# Patient Record
Sex: FEMALE | Race: WHITE | NOT HISPANIC OR LATINO | Employment: OTHER | URBAN - METROPOLITAN AREA
[De-identification: names, ages, dates, MRNs, and addresses within clinical notes are randomized per-mention and may not be internally consistent; named-entity substitution may affect disease eponyms.]

---

## 2018-10-02 ENCOUNTER — HOSPITAL ENCOUNTER (INPATIENT)
Facility: HOSPITAL | Age: 73
LOS: 9 days | Discharge: HOME | End: 2018-10-11
Attending: EMERGENCY MEDICINE | Admitting: OBSTETRICS & GYNECOLOGY

## 2018-10-02 ENCOUNTER — APPOINTMENT (OUTPATIENT)
Dept: CT IMAGING | Facility: HOSPITAL | Age: 73
End: 2018-10-02

## 2018-10-02 DIAGNOSIS — R19.00 PELVIC MASS: ICD-10-CM

## 2018-10-02 DIAGNOSIS — R10.31 ACUTE BILATERAL LOWER ABDOMINAL PAIN: Primary | ICD-10-CM

## 2018-10-02 DIAGNOSIS — R10.32 ACUTE BILATERAL LOWER ABDOMINAL PAIN: Primary | ICD-10-CM

## 2018-10-02 PROBLEM — I10 HTN (HYPERTENSION): Status: ACTIVE | Noted: 2018-10-02

## 2018-10-02 LAB
ALBUMIN SERPL-MCNC: 4.54 G/DL (ref 3.2–4.8)
ALBUMIN/GLOB SERPL: 1.9 G/DL (ref 1.5–2.5)
ALP SERPL-CCNC: 53 U/L (ref 25–100)
ALT SERPL W P-5'-P-CCNC: 21 U/L (ref 7–40)
ANION GAP SERPL CALCULATED.3IONS-SCNC: 8 MMOL/L (ref 3–11)
AST SERPL-CCNC: 20 U/L (ref 0–33)
BACTERIA UR QL AUTO: ABNORMAL /HPF
BASOPHILS # BLD AUTO: 0.02 10*3/MM3 (ref 0–0.2)
BASOPHILS NFR BLD AUTO: 0.2 % (ref 0–1)
BILIRUB SERPL-MCNC: 0.7 MG/DL (ref 0.3–1.2)
BILIRUB UR QL STRIP: NEGATIVE
BUN BLD-MCNC: 16 MG/DL (ref 9–23)
BUN/CREAT SERPL: 30.2 (ref 7–25)
CALCIUM SPEC-SCNC: 9.5 MG/DL (ref 8.7–10.4)
CANCER AG125 SERPL QL: 40.1 U/ML (ref 0–30.2)
CEA SERPL-MCNC: 2.1 NG/ML (ref 0–2.5)
CHLORIDE SERPL-SCNC: 98 MMOL/L (ref 99–109)
CLARITY UR: ABNORMAL
CO2 SERPL-SCNC: 30 MMOL/L (ref 20–31)
COLOR UR: YELLOW
CREAT BLD-MCNC: 0.53 MG/DL (ref 0.6–1.3)
D-LACTATE SERPL-SCNC: 1.5 MMOL/L (ref 0.5–2)
DEPRECATED RDW RBC AUTO: 43.3 FL (ref 37–54)
EOSINOPHIL # BLD AUTO: 0 10*3/MM3 (ref 0–0.3)
EOSINOPHIL NFR BLD AUTO: 0 % (ref 0–3)
ERYTHROCYTE [DISTWIDTH] IN BLOOD BY AUTOMATED COUNT: 14 % (ref 11.3–14.5)
GFR SERPL CREATININE-BSD FRML MDRD: 113 ML/MIN/1.73
GLOBULIN UR ELPH-MCNC: 2.4 GM/DL
GLUCOSE BLD-MCNC: 126 MG/DL (ref 70–100)
GLUCOSE UR STRIP-MCNC: NEGATIVE MG/DL
HCT VFR BLD AUTO: 43.3 % (ref 34.5–44)
HGB BLD-MCNC: 14 G/DL (ref 11.5–15.5)
HGB UR QL STRIP.AUTO: NEGATIVE
HOLD SPECIMEN: NORMAL
HOLD SPECIMEN: NORMAL
HYALINE CASTS UR QL AUTO: ABNORMAL /LPF
IMM GRANULOCYTES # BLD: 0.01 10*3/MM3 (ref 0–0.03)
IMM GRANULOCYTES NFR BLD: 0.1 % (ref 0–0.6)
KETONES UR QL STRIP: NEGATIVE
LEUKOCYTE ESTERASE UR QL STRIP.AUTO: ABNORMAL
LIPASE SERPL-CCNC: 41 U/L (ref 6–51)
LYMPHOCYTES # BLD AUTO: 1.04 10*3/MM3 (ref 0.6–4.8)
LYMPHOCYTES NFR BLD AUTO: 11.7 % (ref 24–44)
MCH RBC QN AUTO: 27.3 PG (ref 27–31)
MCHC RBC AUTO-ENTMCNC: 32.3 G/DL (ref 32–36)
MCV RBC AUTO: 84.6 FL (ref 80–99)
MONOCYTES # BLD AUTO: 0.37 10*3/MM3 (ref 0–1)
MONOCYTES NFR BLD AUTO: 4.2 % (ref 0–12)
NEUTROPHILS # BLD AUTO: 7.46 10*3/MM3 (ref 1.5–8.3)
NEUTROPHILS NFR BLD AUTO: 83.9 % (ref 41–71)
NITRITE UR QL STRIP: NEGATIVE
PH UR STRIP.AUTO: 7.5 [PH] (ref 5–8)
PLATELET # BLD AUTO: 215 10*3/MM3 (ref 150–450)
PMV BLD AUTO: 9.9 FL (ref 6–12)
POTASSIUM BLD-SCNC: 3.7 MMOL/L (ref 3.5–5.5)
PROT SERPL-MCNC: 6.9 G/DL (ref 5.7–8.2)
PROT UR QL STRIP: NEGATIVE
RBC # BLD AUTO: 5.12 10*6/MM3 (ref 3.89–5.14)
RBC # UR: ABNORMAL /HPF
REF LAB TEST METHOD: ABNORMAL
SODIUM BLD-SCNC: 136 MMOL/L (ref 132–146)
SP GR UR STRIP: 1.02 (ref 1–1.03)
SQUAMOUS #/AREA URNS HPF: ABNORMAL /HPF
UROBILINOGEN UR QL STRIP: ABNORMAL
WBC NRBC COR # BLD: 8.89 10*3/MM3 (ref 3.5–10.8)
WBC UR QL AUTO: ABNORMAL /HPF
WHOLE BLOOD HOLD SPECIMEN: NORMAL
WHOLE BLOOD HOLD SPECIMEN: NORMAL

## 2018-10-02 PROCEDURE — 82378 CARCINOEMBRYONIC ANTIGEN: CPT | Performed by: HOSPITALIST

## 2018-10-02 PROCEDURE — 85025 COMPLETE CBC W/AUTO DIFF WBC: CPT

## 2018-10-02 PROCEDURE — 83605 ASSAY OF LACTIC ACID: CPT | Performed by: EMERGENCY MEDICINE

## 2018-10-02 PROCEDURE — 99284 EMERGENCY DEPT VISIT MOD MDM: CPT

## 2018-10-02 PROCEDURE — 74176 CT ABD & PELVIS W/O CONTRAST: CPT

## 2018-10-02 PROCEDURE — 25010000002 ONDANSETRON PER 1 MG: Performed by: HOSPITALIST

## 2018-10-02 PROCEDURE — 99223 1ST HOSP IP/OBS HIGH 75: CPT | Performed by: HOSPITALIST

## 2018-10-02 PROCEDURE — 83690 ASSAY OF LIPASE: CPT | Performed by: EMERGENCY MEDICINE

## 2018-10-02 PROCEDURE — 25810000003 SODIUM CHLORIDE 0.9 % WITH KCL 20 MEQ 20-0.9 MEQ/L-% SOLUTION: Performed by: HOSPITALIST

## 2018-10-02 PROCEDURE — 25010000002 HYDROMORPHONE PER 4 MG: Performed by: EMERGENCY MEDICINE

## 2018-10-02 PROCEDURE — 81001 URINALYSIS AUTO W/SCOPE: CPT

## 2018-10-02 PROCEDURE — 80053 COMPREHEN METABOLIC PANEL: CPT | Performed by: EMERGENCY MEDICINE

## 2018-10-02 PROCEDURE — 86304 IMMUNOASSAY TUMOR CA 125: CPT | Performed by: HOSPITALIST

## 2018-10-02 PROCEDURE — 25010000002 ONDANSETRON PER 1 MG: Performed by: EMERGENCY MEDICINE

## 2018-10-02 PROCEDURE — 25010000002 HYDROMORPHONE PER 4 MG: Performed by: HOSPITALIST

## 2018-10-02 RX ORDER — HYDROMORPHONE HYDROCHLORIDE 1 MG/ML
0.5 INJECTION, SOLUTION INTRAMUSCULAR; INTRAVENOUS; SUBCUTANEOUS ONCE
Status: COMPLETED | OUTPATIENT
Start: 2018-10-02 | End: 2018-10-02

## 2018-10-02 RX ORDER — HYDROMORPHONE HYDROCHLORIDE 1 MG/ML
0.5 INJECTION, SOLUTION INTRAMUSCULAR; INTRAVENOUS; SUBCUTANEOUS
Status: DISCONTINUED | OUTPATIENT
Start: 2018-10-02 | End: 2018-10-03

## 2018-10-02 RX ORDER — SODIUM CHLORIDE 0.9 % (FLUSH) 0.9 %
10 SYRINGE (ML) INJECTION AS NEEDED
Status: DISCONTINUED | OUTPATIENT
Start: 2018-10-02 | End: 2018-10-08

## 2018-10-02 RX ORDER — SODIUM CHLORIDE 0.9 % (FLUSH) 0.9 %
3 SYRINGE (ML) INJECTION EVERY 12 HOURS SCHEDULED
Status: DISCONTINUED | OUTPATIENT
Start: 2018-10-02 | End: 2018-10-08

## 2018-10-02 RX ORDER — ONDANSETRON 2 MG/ML
4 INJECTION INTRAMUSCULAR; INTRAVENOUS ONCE
Status: COMPLETED | OUTPATIENT
Start: 2018-10-02 | End: 2018-10-02

## 2018-10-02 RX ORDER — SODIUM CHLORIDE 0.9 % (FLUSH) 0.9 %
3-10 SYRINGE (ML) INJECTION AS NEEDED
Status: DISCONTINUED | OUTPATIENT
Start: 2018-10-02 | End: 2018-10-08

## 2018-10-02 RX ORDER — ONDANSETRON 4 MG/1
4 TABLET, FILM COATED ORAL EVERY 6 HOURS PRN
Status: DISCONTINUED | OUTPATIENT
Start: 2018-10-02 | End: 2018-10-11 | Stop reason: HOSPADM

## 2018-10-02 RX ORDER — LOSARTAN POTASSIUM 50 MG/1
50 TABLET ORAL DAILY
COMMUNITY

## 2018-10-02 RX ORDER — ONDANSETRON 2 MG/ML
4 INJECTION INTRAMUSCULAR; INTRAVENOUS EVERY 6 HOURS PRN
Status: DISCONTINUED | OUTPATIENT
Start: 2018-10-02 | End: 2018-10-11 | Stop reason: HOSPADM

## 2018-10-02 RX ORDER — SODIUM CHLORIDE AND POTASSIUM CHLORIDE 150; 900 MG/100ML; MG/100ML
100 INJECTION, SOLUTION INTRAVENOUS CONTINUOUS
Status: DISCONTINUED | OUTPATIENT
Start: 2018-10-02 | End: 2018-10-03

## 2018-10-02 RX ORDER — NALOXONE HCL 0.4 MG/ML
0.4 VIAL (ML) INJECTION
Status: DISCONTINUED | OUTPATIENT
Start: 2018-10-02 | End: 2018-10-03

## 2018-10-02 RX ADMIN — ONDANSETRON 4 MG: 2 INJECTION INTRAMUSCULAR; INTRAVENOUS at 10:34

## 2018-10-02 RX ADMIN — HYDROMORPHONE HYDROCHLORIDE 0.5 MG: 1 INJECTION, SOLUTION INTRAMUSCULAR; INTRAVENOUS; SUBCUTANEOUS at 17:44

## 2018-10-02 RX ADMIN — HYDROMORPHONE HYDROCHLORIDE 0.5 MG: 1 INJECTION, SOLUTION INTRAMUSCULAR; INTRAVENOUS; SUBCUTANEOUS at 22:37

## 2018-10-02 RX ADMIN — HYDROMORPHONE HYDROCHLORIDE 0.5 MG: 1 INJECTION, SOLUTION INTRAMUSCULAR; INTRAVENOUS; SUBCUTANEOUS at 15:33

## 2018-10-02 RX ADMIN — HYDROMORPHONE HYDROCHLORIDE 0.5 MG: 1 INJECTION, SOLUTION INTRAMUSCULAR; INTRAVENOUS; SUBCUTANEOUS at 13:24

## 2018-10-02 RX ADMIN — POTASSIUM CHLORIDE AND SODIUM CHLORIDE 100 ML/HR: 900; 150 INJECTION, SOLUTION INTRAVENOUS at 13:29

## 2018-10-02 RX ADMIN — SODIUM CHLORIDE 1000 ML: 9 INJECTION, SOLUTION INTRAVENOUS at 10:34

## 2018-10-02 RX ADMIN — HYDROMORPHONE HYDROCHLORIDE 0.5 MG: 1 INJECTION, SOLUTION INTRAMUSCULAR; INTRAVENOUS; SUBCUTANEOUS at 10:35

## 2018-10-02 RX ADMIN — SODIUM CHLORIDE 500 ML: 9 INJECTION, SOLUTION INTRAVENOUS at 13:24

## 2018-10-02 RX ADMIN — ONDANSETRON 4 MG: 2 INJECTION INTRAMUSCULAR; INTRAVENOUS at 17:52

## 2018-10-02 RX ADMIN — Medication 3 ML: at 13:28

## 2018-10-02 RX ADMIN — POTASSIUM CHLORIDE AND SODIUM CHLORIDE 100 ML/HR: 900; 150 INJECTION, SOLUTION INTRAVENOUS at 23:51

## 2018-10-02 NOTE — ED PROVIDER NOTES
Subjective   Issa Farmer is a 72 y.o.female who presents to the emergency department with complaints of pain in the left lower quadrant of the abdomen and the left flank which began this morning at 0100. She thought that the pain would go away, however, she states that it has persisted and remained unchanged since onset which prompted arrival here for evaluation. She has been unable to find a comfortable position but tells us that the pain does not seem to be worsened by sitting up or lying down. She has also been nauseous and told a family member that she was feeling bloated. There are no other acute complaints at this time.        History provided by:  Patient and relative  Abdominal Pain   Pain location:  LLQ and L flank  Pain radiates to:  Does not radiate  Pain severity:  Moderate  Onset quality:  Sudden  Duration:  9 hours  Timing:  Constant  Progression:  Unchanged  Chronicity:  New  Relieved by:  None tried  Worsened by:  Nothing  Ineffective treatments:  None tried  Associated symptoms: nausea    Associated symptoms: no vomiting    Risk factors: being elderly        Review of Systems   Gastrointestinal: Positive for abdominal distention, abdominal pain and nausea. Negative for vomiting.   Genitourinary: Positive for flank pain.   All other systems reviewed and are negative.      Past Medical History:   Diagnosis Date   • Cancer (CMS/HCC)    • Hypertension        No Known Allergies    Past Surgical History:   Procedure Laterality Date   • MASTECTOMY     • REPLACEMENT TOTAL KNEE     • TUBAL ABDOMINAL LIGATION         History reviewed. No pertinent family history.    Social History     Social History   • Marital status:      Social History Main Topics   • Smoking status: Never Smoker   • Smokeless tobacco: Never Used   • Alcohol use No   • Drug use: No   • Sexual activity: Defer     Other Topics Concern   • Not on file         Objective   Physical Exam   Constitutional: She is oriented to person,  place, and time. She appears well-developed and well-nourished. No distress.   HENT:   Head: Normocephalic and atraumatic.   Eyes: Conjunctivae are normal. No scleral icterus.   Neck: Normal range of motion. Neck supple.   Cardiovascular: Normal rate, regular rhythm and normal heart sounds.    No murmur heard.  Pulmonary/Chest: Effort normal and breath sounds normal. No respiratory distress.   Abdominal: Soft. Bowel sounds are normal. There is tenderness in the right lower quadrant. There is no rebound and no guarding.   She had a soft belly with no rebound tenderness. Active bowel sounds. She was tender in the right lower quadrant.   Musculoskeletal: She exhibits no edema.   Neurological: She is alert and oriented to person, place, and time.   Skin: Skin is warm and dry. No erythema.   Psychiatric: She has a normal mood and affect. Her behavior is normal.   Nursing note and vitals reviewed.      Procedures         ED Course  ED Course as of Oct 02 1332   Tue Oct 02, 2018   1124 Dr. Reese paged the hospitalist for admission.  [AS]   1124 Dr. Reese is at the bedside reevaluating the patient. Discussed findings with the patient and her family including a normal urinalysis and lab results although she does have a large pelvic mass on her CT scan. They are agreeable with the plan for admission for further workup.   [AS]   1124 I reviewed Mrs. Sánchez's CT report and the images as well.  It is not clear if this is arising from her reproductive tract.  Will seek admission to the hospital for surgical consultation as this thing is causing her great deal of pain and is quite large.  I have paged Dr. Baca who is on-call for the hospitalist service  [DT]   1128 Dr. Reese discussed the case in detail with the hospitalist Dr. Baca who will admit to med surg. Will also consult Dr. Gale Rene, on call for general surgery.    [AS]   1130 Dr. Gale Rene, general surgery, has been paged.  [AS]   1133 Dr. Reese  consulted Dr. Gale Rene, general surgery.   [AS]      ED Course User Index  [AS] Erica Hutson  [DT] Gil Reese MD                     MDM  Number of Diagnoses or Management Options  Acute bilateral lower abdominal pain: new and requires workup  Pelvic mass: new and requires workup     Amount and/or Complexity of Data Reviewed  Clinical lab tests: reviewed and ordered  Tests in the radiology section of CPT®: ordered and reviewed  Discuss the patient with other providers: yes  Independent visualization of images, tracings, or specimens: yes    Patient Progress  Patient progress: improved      Final diagnoses:   Acute bilateral lower abdominal pain   Pelvic mass       Documentation assistance provided by mayra Hutson.  Information recorded by the scribe was done at my direction and has been verified and validated by me.     Erica Hutson  10/02/18 1029       Erica Hutson  10/02/18 1131       Gil Reese MD  10/02/18 0131

## 2018-10-02 NOTE — H&P
[facility] Medicine Services  HISTORY AND PHYSICAL    Patient Name: Issa Farmer  : 1945  MRN: [id number]  Primary Care Physician: Yan Unger DO  Date of admission: 10/2/2018      Subjective   Subjective     Chief Complaint:  Abdominal pain    HPI:  Issa Farmer is a 72 y.o. female with acute onset of LLQ/L flank abdominal pain, 10/10, waxing and waning, described as severe and sharp and similar to labor/birth pain. Associated n/v. Unable to take po. Increased abdominal bloating this week. Normal BM with laxative this am. No blood in stool. ?Vaginal discharge and thought she was getting a yeast infection. No abnormal uterine bleeding. Denies weight loss.    No colonoscopy ever.  No pap smear in several years.  Regular mammograms, and due this year.  Hx of L mastectomy for breast cancer remotely.    Review of Systems   Constitutional: Positive for activity change, appetite change and fatigue.   Respiratory: Positive for cough and shortness of breath. Negative for chest tightness, wheezing and stridor.    Cardiovascular: Negative for chest pain, palpitations and leg swelling.   Gastrointestinal: Positive for abdominal distention, abdominal pain, nausea and vomiting.   Genitourinary: Negative for dysuria.   Musculoskeletal: Positive for back pain.   Neurological: Positive for light-headedness.   Hematological: Negative for adenopathy.          Otherwise 10-system ROS reviewed and is negative except as mentioned in the HPI.    Personal History     Past Medical History:   Diagnosis Date   • Cancer (CMS/HCC)    • Hypertension        Past Surgical History:   Procedure Laterality Date   • MASTECTOMY     • REPLACEMENT TOTAL KNEE     • TUBAL ABDOMINAL LIGATION         Family History: DM, breast cancer    Social History:  reports that she has never smoked. She has never used smokeless tobacco. She reports that she does not drink alcohol or use drugs.  Social History      Social History Narrative   • No narrative on file       Medications:  Available home medication information reviewed/reconciled    No Known Allergies    Objective   Objective     Vital Signs:   Temp:  [98.3 °F (36.8 °C)] 98.3 °F (36.8 °C)  Heart Rate:  [78-89] 78  Resp:  [16] 16  BP: (154-161)/(81-97) 154/83        Physical Exam   NAD, alert and oriented  OP clear, dry MM  PERRL  Neck supple  RRR  CTAB  +BS, mod distended, TTP L abd/LLQ, no guarding or rebound, no flank tenderness  No c/c, tr edema B  No rashes  Normal affect    Results Reviewed:  I have personally reviewed current lab, radiology, and data and agree.      Results from last 7 days  Lab Units 10/02/18  0942   WBC 10*3/mm3 8.89   HEMOGLOBIN g/dL 14.0   HEMATOCRIT % 43.3   PLATELETS 10*3/mm3 215       Results from last 7 days  Lab Units 10/02/18  0942   SODIUM mmol/L 136   POTASSIUM mmol/L 3.7   CHLORIDE mmol/L 98*   CO2 mmol/L 30.0   BUN mg/dL 16   CREATININE mg/dL 0.53*   GLUCOSE mg/dL 126*   CALCIUM mg/dL 9.5   ALT (SGPT) U/L 21   AST (SGOT) U/L 20     Estimated Creatinine Clearance: 63.9 mL/min (A) (by C-G formula based on SCr of 0.53 mg/dL (L)).  Brief Urine Lab Results  (Last result in the past 365 days)      Color   Clarity   Blood   Leuk Est   Nitrite   Protein   CREAT   Urine HCG        10/02/18 0942 Yellow Cloudy(A) Negative Moderate (2+)(A) Negative Negative             No results found for: BNP  Imaging Results (last 24 hours)     Procedure Component Value Units Date/Time    CT Abdomen Pelvis Without Contrast [[id number]] Collected:  10/02/18 1102     Updated:  10/02/18 1106    Narrative:       EXAMINATION: CT ABDOMEN AND PELVIS WO CONTRAST-      INDICATION: Abdominal pain, unspecified.     TECHNIQUE:  Axial CT data of the abdomen and pelvis were obtained  helically without IV contrast.  Multiplanar reformatted images were  generated and reviewed. The radiation dose reduction device was turned  on for each scan per the ALARA (As Low as  Reasonably Achievable)  protocol.     COMPARISONS:  None.     FINDINGS: There is a large mass centered to the right of midline in the  pelvis measuring 12 x 18 x 10 cm in maximal dimension. The superolateral  right component is solid and the rest of the lesion is cystic. There are  some punctate calcifications and internal heterogeneities to the solid  component.  The solid abdominal organs are unremarkable in unenhanced CT  appearance. Uterus is unremarkable. No free fluid. No enlarged lymph  node. No dilated large or small bowel. Appendix is not identified.  Colonic diverticulosis. Clear lung bases. Unremarkable body wall soft  tissues and unremarkable bones.       Impression:       1. Large part cystic/part solid mass in the pelvis. Recommend surgical  consultation.  2. No acute finding.     D:  10/02/2018  E:  10/02/2018     This report was finalized on 10/2/2018 11:04 AM by Leonard Mendosa.                Assessment/Plan   Assessment / Plan     Hospital Problem List     * (Principal)Acute bilateral lower abdominal pain    Pelvic mass    HTN (hypertension)            Assessment & Plan:  Pelvic mass  --consult surgery  Abd pain/nausea  --IVF, dilaudid/zofran  HTN  --hold cozaar, volume depleted  Hx of breast cancer    DVT prophylaxis:  SCDS    CODE STATUS:    Code Status and Medical Interventions:   Ordered at: 10/02/18 1153     Code Status:    CPR     Medical Interventions (Level of Support Prior to Arrest):    Full       Admission Status:  I believe this patient meets observation criteria.      Electronically signed by Warren Baca MD, 10/02/18, 11:56 AM.

## 2018-10-03 LAB
ALBUMIN SERPL-MCNC: 3.61 G/DL (ref 3.2–4.8)
ALBUMIN/GLOB SERPL: 2.1 G/DL (ref 1.5–2.5)
ALP SERPL-CCNC: 42 U/L (ref 25–100)
ALT SERPL W P-5'-P-CCNC: 12 U/L (ref 7–40)
ANION GAP SERPL CALCULATED.3IONS-SCNC: 6 MMOL/L (ref 3–11)
APTT PPP: 30.5 SECONDS (ref 24–31)
AST SERPL-CCNC: 17 U/L (ref 0–33)
BILIRUB SERPL-MCNC: 0.4 MG/DL (ref 0.3–1.2)
BUN BLD-MCNC: 11 MG/DL (ref 9–23)
BUN/CREAT SERPL: 24.4 (ref 7–25)
CALCIUM SPEC-SCNC: 8.2 MG/DL (ref 8.7–10.4)
CHLORIDE SERPL-SCNC: 105 MMOL/L (ref 99–109)
CO2 SERPL-SCNC: 28 MMOL/L (ref 20–31)
CREAT BLD-MCNC: 0.45 MG/DL (ref 0.6–1.3)
GFR SERPL CREATININE-BSD FRML MDRD: 136 ML/MIN/1.73
GLOBULIN UR ELPH-MCNC: 1.7 GM/DL
GLUCOSE BLD-MCNC: 89 MG/DL (ref 70–100)
INR PPP: 1.04 (ref 0.91–1.09)
POTASSIUM BLD-SCNC: 4.3 MMOL/L (ref 3.5–5.5)
PROT SERPL-MCNC: 5.3 G/DL (ref 5.7–8.2)
PROTHROMBIN TIME: 10.9 SECONDS (ref 9.6–11.5)
SODIUM BLD-SCNC: 139 MMOL/L (ref 132–146)

## 2018-10-03 PROCEDURE — 99223 1ST HOSP IP/OBS HIGH 75: CPT | Performed by: OBSTETRICS & GYNECOLOGY

## 2018-10-03 PROCEDURE — 25810000003 SODIUM CHLORIDE 0.9 % WITH KCL 20 MEQ 20-0.9 MEQ/L-% SOLUTION: Performed by: HOSPITALIST

## 2018-10-03 PROCEDURE — 85730 THROMBOPLASTIN TIME PARTIAL: CPT | Performed by: SURGERY

## 2018-10-03 PROCEDURE — 85610 PROTHROMBIN TIME: CPT | Performed by: SURGERY

## 2018-10-03 PROCEDURE — 25010000002 HYDROMORPHONE PER 4 MG: Performed by: INTERNAL MEDICINE

## 2018-10-03 PROCEDURE — 80053 COMPREHEN METABOLIC PANEL: CPT | Performed by: HOSPITALIST

## 2018-10-03 PROCEDURE — 25010000002 HYDROMORPHONE PER 4 MG: Performed by: HOSPITALIST

## 2018-10-03 PROCEDURE — 99233 SBSQ HOSP IP/OBS HIGH 50: CPT | Performed by: INTERNAL MEDICINE

## 2018-10-03 RX ORDER — NALOXONE HCL 0.4 MG/ML
0.4 VIAL (ML) INJECTION
Status: DISCONTINUED | OUTPATIENT
Start: 2018-10-03 | End: 2018-10-08

## 2018-10-03 RX ORDER — HYDROCODONE BITARTRATE AND ACETAMINOPHEN 5; 325 MG/1; MG/1
1 TABLET ORAL EVERY 6 HOURS PRN
Status: DISCONTINUED | OUTPATIENT
Start: 2018-10-03 | End: 2018-10-04

## 2018-10-03 RX ORDER — HYDROMORPHONE HYDROCHLORIDE 1 MG/ML
0.5 INJECTION, SOLUTION INTRAMUSCULAR; INTRAVENOUS; SUBCUTANEOUS
Status: DISCONTINUED | OUTPATIENT
Start: 2018-10-03 | End: 2018-10-08

## 2018-10-03 RX ORDER — SENNA AND DOCUSATE SODIUM 50; 8.6 MG/1; MG/1
2 TABLET, FILM COATED ORAL 2 TIMES DAILY
Status: DISCONTINUED | OUTPATIENT
Start: 2018-10-03 | End: 2018-10-08

## 2018-10-03 RX ORDER — BISACODYL 5 MG/1
10 TABLET, DELAYED RELEASE ORAL DAILY PRN
Status: DISCONTINUED | OUTPATIENT
Start: 2018-10-03 | End: 2018-10-08

## 2018-10-03 RX ORDER — LOSARTAN POTASSIUM 25 MG/1
25 TABLET ORAL
Status: DISCONTINUED | OUTPATIENT
Start: 2018-10-03 | End: 2018-10-04

## 2018-10-03 RX ADMIN — LOSARTAN POTASSIUM 25 MG: 25 TABLET, FILM COATED ORAL at 12:26

## 2018-10-03 RX ADMIN — POLYETHYLENE GLYCOL 3350 17 G: 17 POWDER, FOR SOLUTION ORAL at 21:48

## 2018-10-03 RX ADMIN — POTASSIUM CHLORIDE AND SODIUM CHLORIDE 100 ML/HR: 900; 150 INJECTION, SOLUTION INTRAVENOUS at 10:27

## 2018-10-03 RX ADMIN — Medication 3 ML: at 10:18

## 2018-10-03 RX ADMIN — HYDROMORPHONE HYDROCHLORIDE 0.5 MG: 1 INJECTION, SOLUTION INTRAMUSCULAR; INTRAVENOUS; SUBCUTANEOUS at 06:10

## 2018-10-03 RX ADMIN — HYDROMORPHONE HYDROCHLORIDE 0.5 MG: 1 INJECTION, SOLUTION INTRAMUSCULAR; INTRAVENOUS; SUBCUTANEOUS at 16:01

## 2018-10-03 RX ADMIN — HYDROCODONE BITARTRATE AND ACETAMINOPHEN 1 TABLET: 5; 325 TABLET ORAL at 12:26

## 2018-10-03 RX ADMIN — HYDROCODONE BITARTRATE AND ACETAMINOPHEN 1 TABLET: 5; 325 TABLET ORAL at 21:48

## 2018-10-03 RX ADMIN — Medication 3 ML: at 21:50

## 2018-10-03 RX ADMIN — DOCUSATE SODIUM,SENNOSIDES 2 TABLET: 50; 8.6 TABLET, FILM COATED ORAL at 21:48

## 2018-10-03 RX ADMIN — DOCUSATE SODIUM,SENNOSIDES 2 TABLET: 50; 8.6 TABLET, FILM COATED ORAL at 12:26

## 2018-10-03 NOTE — CONSULTS
Patient seen and evaluated per request of Dr. Reese.  Full consult to be dictated.  Recommend core needle biopsy of solid component of tumor; will discuss with radiology.  Orders placed.  Will follow.

## 2018-10-03 NOTE — PLAN OF CARE
Problem: Patient Care Overview  Goal: Plan of Care Review  Outcome: Ongoing (interventions implemented as appropriate)   10/03/18 1608   Coping/Psychosocial   Plan of Care Reviewed With patient;family   OTHER   Outcome Summary Pt pain is tolerable today given PRNs Up voiding fine no nausea reported today. Consult for gyn/onc taking patient down for vaginal exam and will go from there on the plan family at bedside   Plan of Care Review   Progress improving     Goal: Discharge Needs Assessment   10/03/18 1035   Disability   Equipment Currently Used at Home none   Discharge Needs Assessment   Readmission Within the Last 30 Days no previous admission in last 30 days   Concerns to be Addressed no discharge needs identified;denies needs/concerns at this time   Patient/Family Anticipates Transition to home   Patient/Family Anticipated Services at Transition none   Transportation Anticipated family or friend will provide   Anticipated Changes Related to Illness none       Problem: Pain, Acute (Adult)  Goal: Identify Related Risk Factors and Signs and Symptoms  Outcome: Ongoing (interventions implemented as appropriate)   10/03/18 1608   Pain, Acute (Adult)   Related Risk Factors (Acute Pain) persistent pain;patient perception   Signs and Symptoms (Acute Pain) verbalization of pain descriptors     Goal: Acceptable Pain Control/Comfort Level  Outcome: Ongoing (interventions implemented as appropriate)   10/03/18 1608   Pain, Acute (Adult)   Acceptable Pain Control/Comfort Level making progress toward outcome

## 2018-10-03 NOTE — CONSULTS
General Surgery Consultation Note    Date of Service: 10/2/2018    Issa Farmer  [id number]  1945      Referring Provider: Nicholas Rhodes MD    Location of Consult: [facility]     Reason for Consultation: Abdominal Pain + Mass      History of Present Illness:  I am seeing, Issa Farmer, in consultation for Nicholas Rhodes MD regarding abdominal pain with incidentally found abdominal mass.  The patient is a 72-year-old female with a history of breast cancer status post left mastectomy in the 1990s of with no evidence of disease who presented to the emergency department on 10.02.2018 with complaints of sudden onset of left flank and left lower quadrant pain.  The patient had not experienced abdominal discomfort or pain prior to yesterday.  She describes the pain as severe (greater than 10 out of 10 on the pain scale) of and as sharp in character.  There was associated nausea with nonbilious, nonbloody emesis yesterday.  She has continued to have bowel movements.  There has been poor oral intake due to the nausea and emesis.  She does relate of a several month history of change in her abdominal girth, as the patient felt that her right abdomen of was more swollen than her left.  She has never had a colonoscopy of, nor has she had Pap smears in recent years.  Regarding her history of breast cancer, she was not treated with chemotherapy or radiation therapy.       Past Medical History:   Diagnosis Date   • Cancer (CMS/HCC)    • Hypertension        Past Surgical History:   Procedure Laterality Date   • MASTECTOMY     • REPLACEMENT TOTAL KNEE     • TUBAL ABDOMINAL LIGATION         No Known Allergies    No current facility-administered medications on file prior to encounter.      No current outpatient prescriptions on file prior to encounter.         Current Facility-Administered Medications:   •  HYDROmorphone (DILAUDID) injection 0.5 mg, 0.5 mg, Intravenous, Q2H PRN, 0.5 mg at 10/03/18 0610 **AND**  "naloxone (NARCAN) injection 0.4 mg, 0.4 mg, Intravenous, Q5 Min PRN, Warren Baca MD  •  ondansetron (ZOFRAN) tablet 4 mg, 4 mg, Oral, Q6H PRN **OR** ondansetron (ZOFRAN) injection 4 mg, 4 mg, Intravenous, Q6H PRN, Warren Baca MD, 4 mg at 10/02/18 1752  •  sodium chloride 0.9 % flush 10 mL, 10 mL, Intravenous, PRN, Gil Reese MD  •  sodium chloride 0.9 % flush 3 mL, 3 mL, Intravenous, Q12H, Warren Baca MD, 3 mL at 10/02/18 1328  •  sodium chloride 0.9 % flush 3-10 mL, 3-10 mL, Intravenous, PRN, Warren Baca MD  •  sodium chloride 0.9 % with KCl 20 mEq/L infusion, 100 mL/hr, Intravenous, Continuous, Warren Baca MD, Last Rate: 100 mL/hr at 10/02/18 2351, 100 mL/hr at 10/02/18 2351      Family History  History reviewed. No pertinent family history.    Social History     Social History   • Marital status:      Spouse name: N/A   • Number of children: N/A   • Years of education: N/A     Occupational History   • Not on file.     Social History Main Topics   • Smoking status: Never Smoker   • Smokeless tobacco: Never Used   • Alcohol use No   • Drug use: No   • Sexual activity: Defer     Other Topics Concern   • Not on file     Social History Narrative   • No narrative on file       Review of Systems:  Review of Systems  As per HPI; otherwise the 12 point review of systems is negative.    /65 (BP Location: Right arm, Patient Position: Lying)   Pulse 84   Temp 98.7 °F (37.1 °C) (Oral)   Resp 21   Ht 165.1 cm (65\")   Wt 78.5 kg (173 lb)   SpO2 99%   BMI 28.79 kg/m²   Body mass index is 28.79 kg/m².    General: NAD, AAO x 3   Eyes:  PER, no icterus, and normal sclera.  Ears, Nose, Mouth, & Throat:  Normal appearance of ears.  Nares patent.  Nasal mucosa, septum, and turbinates normal.  Mucous membranes moist without exudate.  Neck supple without adenopathy.   Cardiovascular: Regular rate and rhythm without murmurs, rubs, or gallops.  Palpable pedal pulses.  Extremities warm and " well perfused.  No edema.    Respiratory: Clear to auscultation bilaterally without rales, ronchi, or wheezes.  Gastrointestinal: Soft, NT, mild distention.  There is a palpable mass in the right lower quadrant.  Neurologic: CN II - XII grossly intact.  No focal neuro deficits.  Skin: No obvious rashes or worrisome lesions noted.      CBC    Results from last 7 days  Lab Units 10/02/18  0942   WBC 10*3/mm3 8.89   HEMOGLOBIN g/dL 14.0   HEMATOCRIT % 43.3   PLATELETS 10*3/mm3 215       CMP    Results from last 7 days  Lab Units 10/03/18  0324   SODIUM mmol/L 139   POTASSIUM mmol/L 4.3   CHLORIDE mmol/L 105   CO2 mmol/L 28.0   BUN mg/dL 11   CREATININE mg/dL 0.45*   CALCIUM mg/dL 8.2*   BILIRUBIN mg/dL 0.4   ALK PHOS U/L 42   ALT (SGPT) U/L 12   AST (SGOT) U/L 17   GLUCOSE mg/dL 89       Radiology  Imaging Results (last 72 hours)     Procedure Component Value Units Date/Time    CT Abdomen Pelvis Without Contrast [[id number]] Collected:  10/02/18 1102     Updated:  10/02/18 1106    Narrative:       EXAMINATION: CT ABDOMEN AND PELVIS WO CONTRAST-      INDICATION: Abdominal pain, unspecified.     TECHNIQUE:  Axial CT data of the abdomen and pelvis were obtained  helically without IV contrast.  Multiplanar reformatted images were  generated and reviewed. The radiation dose reduction device was turned  on for each scan per the ALARA (As Low as Reasonably Achievable)  protocol.     COMPARISONS:  None.     FINDINGS: There is a large mass centered to the right of midline in the  pelvis measuring 12 x 18 x 10 cm in maximal dimension. The superolateral  right component is solid and the rest of the lesion is cystic. There are  some punctate calcifications and internal heterogeneities to the solid  component.  The solid abdominal organs are unremarkable in unenhanced CT  appearance. Uterus is unremarkable. No free fluid. No enlarged lymph  node. No dilated large or small bowel. Appendix is not identified.  Colonic diverticulosis.  Clear lung bases. Unremarkable body wall soft  tissues and unremarkable bones.       Impression:       1. Large part cystic/part solid mass in the pelvis. Recommend surgical  consultation.  2. No acute finding.     D:  10/02/2018  E:  10/02/2018     This report was finalized on 10/2/2018 11:04 AM by Leonard Mendosa.             Assessment:  Mrs. Farmer is a 72-year-old female of who presented to the emergency department with complaints of acute onset left lower quadrant and left flank pain.  CT scan demonstrated a large, bilobed mass in the pelvis with both cystic and solid components.   was mildly elevated at 40.  I have recommended a core needle biopsy of the solid component of the tumor to establish a tissue diagnosis, and I will discuss this plan with our interventional radiology department.  I will continue to follow.    Plan:  - Plan for CNB of solid component of mass  - Pain control; May need palliative consultation  - Will follow      Aline Rene MD  10/03/18  8:59 AM

## 2018-10-03 NOTE — PROGRESS NOTES
[facility] Medicine Services  PROGRESS NOTE    Patient Name: Issa Farmer  : 1945  MRN: [id number]    Date of Admission: 10/2/2018  Length of Stay: 1  Primary Care Physician: Yan Unger DO    Subjective   Subjective     CC:  abd pain, pelvic mass    HPI:  Pain persists but better, constipated w/ small bm yesterday. No fever, no dysuria, no n/v currently. No dyspnea or chest pain    Review of Systems  No headache    Otherwise ROS is negative except as mentioned in the HPI.    Objective   Objective     Vital Signs:   Temp:  [98.1 °F (36.7 °C)-98.7 °F (37.1 °C)] 98.7 °F (37.1 °C)  Heart Rate:  [76-85] 84  Resp:  [16-21] 21  BP: (123-165)/(62-92) 138/65        Physical Exam:  Constitutional:Alert, oriented x 3, nontoxic appearing  Psych:Normal/appropriate affect  HEENT:Ncat, oroph clear  Neck: neck supple, full range of motion  Neuro: Face symmetric, speech clear, equal , moves all extremities  Cardiac: Rrr; No pretibial pitting edema  Resp: Ctab, normal effort  GI: abd soft, nontender to palpation, mild distension, no rebound, no guarding  Skin: No extremity rash  Musculoskeletal/extremities: no cyanosis extremities; no significant ankle edema            Results Reviewed:  I have personally reviewed current lab, radiology, and data and agree.      Results from last 7 days  Lab Units 10/03/18  0324 10/02/18  0942   WBC 10*3/mm3  --  8.89   HEMOGLOBIN g/dL  --  14.0   HEMATOCRIT %  --  43.3   PLATELETS 10*3/mm3  --  215   INR  1.04  --        Results from last 7 days  Lab Units 10/03/18  0324 10/02/18  0942   SODIUM mmol/L 139 136   POTASSIUM mmol/L 4.3 3.7   CHLORIDE mmol/L 105 98*   CO2 mmol/L 28.0 30.0   BUN mg/dL 11 16   CREATININE mg/dL 0.45* 0.53*   GLUCOSE mg/dL 89 126*   CALCIUM mg/dL 8.2* 9.5   ALT (SGPT) U/L 12 21   AST (SGOT) U/L 17 20     Estimated Creatinine Clearance: 63.9 mL/min (A) (by C-G formula based on SCr of 0.45 mg/dL (L)).  No results found  for: BNP    Microbiology Results Abnormal     None          Imaging Results (last 24 hours)     ** No results found for the last 24 hours. **             I have reviewed the medications.      losartan 25 mg Oral Q24H   sennosides-docusate sodium 2 tablet Oral BID   sodium chloride 3 mL Intravenous Q12H         Assessment/Plan   Assessment / Plan     Hospital Problem List     * (Principal)Acute bilateral lower abdominal pain    Pelvic mass    HTN (hypertension)             Brief Hospital Course to date:  Issa Farmer is a 72 y.o. female w/ htn presented with abdominal pain. Ct imaging revealed pelvic mass. Dr. margy chapman w/ surgery consulted and originally ordered ct guided biopsy, which was subsequently cancelled as radiologist felt lesion might be originating from ovary.      Assessment:    *abdominal pain  *pelvic mass   -elevated ca 125   -normal cea level  *chronic constipation  *htn  -----------------------------------  Plan:  -dr. Chapman (surgery) following, she spoke w/ radiology and ct guided biopsy cancelled as radiologist felt mass possibly originating from right ovary  -per my discussion w/ dr. Chapman on 10/3 morning, she is consulting gyn-onc today  -restart home losartan at decreased dose  -keep bowels moving  -analgesics  -bladder scan to ensure not retaining urine  -clears    -bmp in a.m.    DVT Prophylaxis:  Holding in anticipation possible biopsy    CODE STATUS:   Code Status and Medical Interventions:   Ordered at: 10/02/18 1153     Code Status:    CPR     Medical Interventions (Level of Support Prior to Arrest):    Full       Disposition: I expect the patient to be discharged unclear      Electronically signed by Nicholas Rhodes MD, 10/03/18, 11:43 AM.

## 2018-10-03 NOTE — H&P
GYN Oncology Inpatient Consultation     Subjective     Date of Consultation:  10/3/2018    Reason for consultation:  Pelvic mass     History of present illness:  The patient is a 72 y.o. female with h/o breast cancer and HTN who presented to [facility] ED 10/1/2018 @ 0100 with acute onset left lower quadrant pain with 10/10 pain. She reports the pain waxes and wanes. She states the pain was associated with nausea and emesis x1 yesterday. She has also noted increased right sided abdominal swelling for the past several months. She has never had a colonoscopy.  Regarding her history of breast cancer, she was not treated with chemotherapy or radiation therapy. Patient denies diarrhea, constipation, nausea, emesis, fever, chills, weight loss. No other complaints at this time.       OBGYN History: She is a .  No history of abnormal paps. Patient uncertain of when last pap smear was but reports no abnormal paps in the past.     Past Medical History:   Diagnosis Date   • Cancer (CMS/HCC)    • Hypertension        Past Surgical History:   Procedure Laterality Date   • MASTECTOMY     • REPLACEMENT TOTAL KNEE     • TUBAL ABDOMINAL LIGATION         MEDICATIONS: The current medication list was reviewed with the patient and updated in the EMR this date per the Medical Assistant. Medication dosages and frequencies were confirmed to be accurate.      Allergies:  has No Known Allergies.    Social History: She denies use of tobacco, alcohol, and illicit drugs.     Family History:  Denies a history of breast, colon, endometrial, ovarian, and prostate cancer.  No history of melanoma.    Review of Systems:  CONSTITUTIONAL:  Weight has been stable. Increasing fatigue.  No fever, chills, night sweats.  PSYCHIATRIC: No history of anxiety, depression. No bipolar disorder or insomnia.  EYES: Vision is unchanged.  She wears glasses.  No photophobia.  EARS, NOSE, MOUTH, AND THROAT: Hearing normal. No swallowing difficulties.  No sore  throat.  ENDOCRINE: No history of diabetes or thyroid disease.  LYMPHATIC: No enlarged lymph nodes  RESPIRATORY: No shortness of breath, cough, asthma or wheezing.  No hemoptysis.  CARDIOVASCULAR: No angina, orthopnea, or edema.  No HTN.  No hyperlipidemia.  GASTROINTESTINAL: See HPI  GENITOURINARY: No dysuria, hematuria, urgency, or frequency.  NEUROLOGIC: No numbness, weakness, motor disturbance, syncope, seizure, or headaches.  MUSCULOSKELETAL: No joint pain.  No muscle weakness.  INTEGUMENTARY: No new skin lesions.  GYNECOLOGIC: Per history of present illness.  HEMATOLOGIC: No bleeding problems.  Denies easy bruising.    Objective      Physical Exam  Vitals:    10/03/18 1600   BP: 171/73   Pulse: 78   Resp: 21   Temp: 98.7 °F (37.1 °C)   SpO2: 99%     Body mass index is 28.79 kg/m².    Wt Readings from Last 3 Encounters:   10/02/18 78.5 kg (173 lb)       ECOG PS 0    GENERAL: Alert, well-appearing female in no apparent distress.  She appears her stated age.  She is here with her daughter.  HEENT: Sclera anicteric, normal eyelids. Head normocephalic, atraumatic. Mucus membranes moist.  Normal dentition.    NECK: Trachea midline, supple, without masses.  No thyromegaly.      CARDIOVASCULAR: Normal rate, regular rhythm, no murmurs, rubs, or gallops.  Extremities symmetric.  No peripheral edema.  RESPIRATORY: Clear to auscultation bilaterally, normal respiratory effort  GASTROINTESTINAL:  Soft, tender to palpation in bilateral lower quadrants, non-distended, no rebound or guarding. R sided abdominal mass appreciated.  MUSCULOSKELETAL:  Normal gait and station.  FROMx4.  No digital cyanosis.    SKIN:  Warm, dry, well-perfused.  All visible areas intact.  No rashes, lesions, ulcers.  PSYCHIATRIC: AO x3, with appropriate affect, normal thought processes  LYMPHATICS:  No cervical or inguinal adenopathy noted.  PELVIC exam:    Normal external female genitalia without lesions or skin changes.  Urethra midline.  Vagina  without lesions.  Cervix 4 cm and without visible lesions.  On bimanual exam vagina and cervix are smooth and without nodularity.  No CMT.  Uterus is of normal, size, shape, and contour. Palpable abdominal masses bilaterally. Right sided mass large, proximally retracted out of pelvis. L sided mass smaller in size and lower in pelvis. Rectovaginal exam revealed smooth rectal epithelium with no invasion into rectum apparent. No tethering of rectum, no nodularity in posterior culdesac.       Diagnostic Data:    Lab Results   Component Value Date    WBC 8.89 10/02/2018    HGB 14.0 10/02/2018    HCT 43.3 10/02/2018    MCV 84.6 10/02/2018     10/02/2018    NEUTROABS 7.46 10/02/2018    GLUCOSE 89 10/03/2018    BUN 11 10/03/2018    CREATININE 0.45 (L) 10/03/2018    EGFRIFNONA 136 10/03/2018     10/03/2018    K 4.3 10/03/2018     10/03/2018    CO2 28.0 10/03/2018    CALCIUM 8.2 (L) 10/03/2018    ALBUMIN 3.61 10/03/2018    AST 17 10/03/2018    ALT 12 10/03/2018    BILITOT 0.4 10/03/2018     40.1 (H) 10/02/2018       EXAMINATION: CT ABDOMEN AND PELVIS WO CONTRAST-      INDICATION: Abdominal pain, unspecified.     TECHNIQUE:  Axial CT data of the abdomen and pelvis were obtained  helically without IV contrast.  Multiplanar reformatted images were  generated and reviewed. The radiation dose reduction device was turned  on for each scan per the ALARA (As Low as Reasonably Achievable)  protocol.     COMPARISONS:  None.     FINDINGS: There is a large mass centered to the right of midline in the  pelvis measuring 12 x 18 x 10 cm in maximal dimension. The superolateral  right component is solid and the rest of the lesion is cystic. There are  some punctate calcifications and internal heterogeneities to the solid  component.  The solid abdominal organs are unremarkable in unenhanced CT  appearance. Uterus is unremarkable. No free fluid. No enlarged lymph  node. No dilated large or small bowel. Appendix is not  identified.  Colonic diverticulosis. Clear lung bases. Unremarkable body wall soft  tissues and unremarkable bones.     IMPRESSION:  1. Large part cystic/part solid mass in the pelvis. Recommend surgical  consultation.  2. No acute finding.     D:  10/02/2018  E:  10/02/2018     This report was finalized on 10/2/2018 11:04 AM by Leonard Mendosa.        Assessment/Plan  This is a 72 y.o. woman with Abdominal pain and pelvic masses    # Pelvic mass  -CT 10/2/2018 images reviewed by Dr. Watts and myself. Two separate masses appreciated on imaging and physical examination likely originating from the ovary. Findings discussed with patient. Mass concerning for cancerous etiology given complex appearing with cystic and solid components.   -Ca 125 mildly elevated at 40.1  -CEA 2.10 wnl  -Due to concerning imaging findings, mildly elevated , physical examination and clinical picture surgical intervention is necessary at this time.  -We discussed proceeding with Total Abdominal Hysterectomy, Bilateral Salpingo-Oophorectomy     -Risks and benefits discussion will be performed prior to surgery.   -Tentitively planning to proceed with operation on Monday 10/8/18  -PO pain control with IV for breakthrough    # HTN  -Stable at this time on losartan    #FEN/PPX  -SQH or ppx lovenox until 10/8/18  -Diet as tolerated     # Dispo:  -Anticipate surgical intervention Monday 10/8/18  -Will need to be NPO/IVF at 0001 10/8/18        Electronically Signed by: Ajit Michael MD  Date: 10/3/2018      Time:  5:53 PM    Note: Speech recognition transcription software was used to dictate portions of this document.  An attempt at proofreading has been made though minor errors in transcription may still be present.  Please do not hesitate to call our office with any questions.

## 2018-10-03 NOTE — NURSING NOTE
Case reviewed with Dr. Stanley for biopsy order. Recommend Gyn consult based on the findings. Information relayed to the patients nurse. Dr. Stanley is available to discuss this further if needed.

## 2018-10-03 NOTE — CONSULTS
GYN Oncology Inpatient Consultation     Subjective     Date of Consultation:  10/3/2018    Reason for consultation:  Pelvic mass     Consultation requested by:  Dr. Nicholas Rhodes, [facility]    History of present illness:  The patient is a 72 y.o. female with h/o breast cancer and HTN who presented to [facility] ED 10/1/2018 @ 0100 with acute onset left lower quadrant pain with 10/10 pain. She reports the pain waxes and wanes. She states the pain was associated with nausea and emesis x1 yesterday. She has also noted increased right sided abdominal swelling for the past several months. She has never had a colonoscopy.  Regarding her history of breast cancer, she was not treated with chemotherapy or radiation therapy. Patient denies diarrhea, constipation, nausea, emesis, fever, chills, weight loss. No other complaints at this time.       OBGYN History: She is a .  No history of abnormal paps. Patient uncertain of when last pap smear was but reports no abnormal paps in the past.     Past Medical History:   Diagnosis Date   • Cancer (CMS/HCC) LT breast    • Hypertension        Past Surgical History:   Procedure Laterality Date   • MASTECTOMY     • REPLACEMENT TOTAL KNEE     • TUBAL ABDOMINAL LIGATION         MEDICATIONS: The current medication list was reviewed with the patient and updated in the EMR this date per the Medical Assistant. Medication dosages and frequencies were confirmed to be accurate.      Allergies:  has No Known Allergies.    Social History: She denies use of tobacco, alcohol, and illicit drugs.     Family History:  +family history of colorectal cancer in sister, personal h/o breast cancer, breast cancer in [place], sister with newly diagnosed B-cell lymphoma.   No history of melanoma.    Review of Systems:  CONSTITUTIONAL:  Weight has been stable. Increasing fatigue.  No fever, chills, night sweats.  PSYCHIATRIC: No history of anxiety, depression. No bipolar disorder or insomnia.  EYES:  Vision is unchanged.  She wears glasses.  No photophobia.  EARS, NOSE, MOUTH, AND THROAT: Hearing normal. No swallowing difficulties.  No sore throat.  ENDOCRINE: No history of diabetes or thyroid disease.  LYMPHATIC: No enlarged lymph nodes  RESPIRATORY: No shortness of breath, cough, asthma or wheezing.  No hemoptysis.  CARDIOVASCULAR: No angina, orthopnea, or edema.  No HTN.  No hyperlipidemia.  GASTROINTESTINAL: See HPI  GENITOURINARY: No dysuria, hematuria, urgency, or frequency.  NEUROLOGIC: No numbness, weakness, motor disturbance, syncope, seizure, or headaches.  MUSCULOSKELETAL: No joint pain.  No muscle weakness.  INTEGUMENTARY: No new skin lesions.  GYNECOLOGIC: Per history of present illness.  HEMATOLOGIC: No bleeding problems.  Denies easy bruising.    Objective      Physical Exam  Vitals:    10/03/18 1600   BP: 171/73   Pulse: 78   Resp: 21   Temp: 98.7 °F (37.1 °C)   SpO2: 99%     Body mass index is 28.79 kg/m².    Wt Readings from Last 3 Encounters:   10/02/18 78.5 kg (173 lb)       ECOG PS 0    GENERAL: Alert, well-appearing female in no apparent distress.  She appears her stated age.  She is here with her daughter.  HEENT: Sclera anicteric, normal eyelids. Head normocephalic, atraumatic. Mucus membranes moist.  Normal dentition.    NECK: Trachea midline, supple, without masses.  No thyromegaly.      CARDIOVASCULAR: Normal rate, regular rhythm, no murmurs, rubs, or gallops.  Extremities symmetric.  No peripheral edema.  RESPIRATORY: Clear to auscultation bilaterally, normal respiratory effort  GASTROINTESTINAL:  Soft, tender to palpation in bilateral lower quadrants, non-distended, no rebound or guarding. Large RT sided abdominal mass appreciated extending above umbilicus.   MUSCULOSKELETAL:  Normal gait and station.  FROMx4.  No digital cyanosis.    SKIN:  Warm, dry, well-perfused.  All visible areas intact.  No rashes, lesions, ulcers.  PSYCHIATRIC: AO x3, with appropriate affect, normal thought  processes  LYMPHATICS:  No cervical or inguinal adenopathy noted.  PELVIC exam:    Normal external female genitalia without lesions or skin changes.  Urethra midline.  Vagina without lesions.  Cervix 4 cm and without visible lesions.  On bimanual exam vagina and cervix are smooth and without nodularity.  No CMT.  Uterus is of normal, size, shape, and contour. Palpable abdominal masses bilaterally. Right sided mass large, proximally retracted out of pelvis. L sided mass smaller in size and lower in pelvis. Rectovaginal exam revealed smooth rectal epithelium with no invasion into rectum apparent. No tethering of rectum, no nodularity in posterior culdesac.       Diagnostic Data:    Lab Results   Component Value Date    WBC 8.89 10/02/2018    HGB 14.0 10/02/2018    HCT 43.3 10/02/2018    MCV 84.6 10/02/2018     10/02/2018    NEUTROABS 7.46 10/02/2018    GLUCOSE 89 10/03/2018    BUN 11 10/03/2018    CREATININE 0.45 (L) 10/03/2018    EGFRIFNONA 136 10/03/2018     10/03/2018    K 4.3 10/03/2018     10/03/2018    CO2 28.0 10/03/2018    CALCIUM 8.2 (L) 10/03/2018    ALBUMIN 3.61 10/03/2018    AST 17 10/03/2018    ALT 12 10/03/2018    BILITOT 0.4 10/03/2018     40.1 (H) 10/02/2018       EXAMINATION: CT ABDOMEN AND PELVIS WO CONTRAST-      INDICATION: Abdominal pain, unspecified.     TECHNIQUE:  Axial CT data of the abdomen and pelvis were obtained  helically without IV contrast.  Multiplanar reformatted images were  generated and reviewed. The radiation dose reduction device was turned  on for each scan per the ALARA (As Low as Reasonably Achievable)  protocol.     COMPARISONS:  None.     FINDINGS: There is a large mass centered to the right of midline in the  pelvis measuring 12 x 18 x 10 cm in maximal dimension. The superolateral  right component is solid and the rest of the lesion is cystic. There are  some punctate calcifications and internal heterogeneities to the solid  component.  The solid  abdominal organs are unremarkable in unenhanced CT  appearance. Uterus is unremarkable. No free fluid. No enlarged lymph  node. No dilated large or small bowel. Appendix is not identified.  Colonic diverticulosis. Clear lung bases. Unremarkable body wall soft  tissues and unremarkable bones.     IMPRESSION:  1. Large part cystic/part solid mass in the pelvis. Recommend surgical  consultation.  2. No acute finding.     D:  10/02/2018  E:  10/02/2018     This report was finalized on 10/2/2018 11:04 AM by Leonard Mendosa.        Assessment/Plan  This is a 72 y.o. woman with Abdominal pain and pelvic masses    # Pelvic mass  -CT 10/2/2018 images reviewed by Dr. Watts and myself. Two separate masses appreciated on imaging and physical examination likely originating from the ovary. Findings discussed with patient. Mass concerning for cancerous etiology given complex appearing with cystic and solid components.   -Ca 125 mildly elevated at 40.1  -CEA 2.10 wnl  -Due to concerning imaging findings, mildly elevated , physical examination and clinical picture surgical intervention is necessary at this time.  -We discussed proceeding with Total Abdominal Hysterectomy, Bilateral Salpingo-Oophorectomy, cancer staging/debulking with possible bowel resection to be determined based on frozen section at the time of surgery.   -Risks and benefits discussion will be performed prior to surgery.   -Tentitively planning to proceed with operation on Monday 10/8/18  -PO pain control with IV for breakthrough  -will require bowel prep 10/7 and ERAS protocol    # HTN  -Stable at this time on losartan    #FEN/PPX  -SQH or ppx lovenox until 10/8/18  -Diet as tolerated     # Dispo:  -Anticipate surgical intervention Monday 10/8/18  -Will need to be NPO/IVF at 0001 10/8/18    # Personal /family h/o cancer  -sister with questions regarding genetics and her personal risk of cancer.  Advised patient undergo diagnostic surgery and the appropriate  testing if indicated.  Then, if patient's testing abnormal, sister would be a candidate for testing.        Electronically Signed by: Ajit Michael MD  Date: 10/3/2018      Time:  5:53 PM    Note: Speech recognition transcription software was used to dictate portions of this document.  An attempt at proofreading has been made though minor errors in transcription may still be present.  Please do not hesitate to call our office with any questions.    I saw and evaluated the patient. I agree with the findings and the plan of care as documented in the note.    Deysi Watts MD  10/03/18  8:46 PM

## 2018-10-03 NOTE — PROGRESS NOTES
"General Surgery Daily Progress Note    Subjective:  Pain better controlled with IV narcotics.      Objective:  /65 (BP Location: Right arm, Patient Position: Lying)   Pulse 84   Temp 98.7 °F (37.1 °C) (Oral)   Resp 21   Ht 165.1 cm (65\")   Wt 78.5 kg (173 lb)   SpO2 99%   BMI 28.79 kg/m²     Physical Exam:  General: NAD, AAO x 3   Abdomen:  Soft, NT, ND.  Palpable mass in right lower quadrant.    Imaging Results (last 24 hours)     ** No results found for the last 24 hours. **          CBC:    Results from last 7 days  Lab Units 10/02/18  0942   WBC 10*3/mm3 8.89   HEMOGLOBIN g/dL 14.0   HEMATOCRIT % 43.3   PLATELETS 10*3/mm3 215       CMP:    Results from last 7 days  Lab Units 10/03/18  0324   SODIUM mmol/L 139   POTASSIUM mmol/L 4.3   CHLORIDE mmol/L 105   CO2 mmol/L 28.0   BUN mg/dL 11   CREATININE mg/dL 0.45*   CALCIUM mg/dL 8.2*   BILIRUBIN mg/dL 0.4   ALK PHOS U/L 42   ALT (SGPT) U/L 12   AST (SGOT) U/L 17   GLUCOSE mg/dL 89         Assessment  HD #1 for 72 year old female admitted with acute onset LLQ pain and found to have a pelvic mass on imaging.  I have reviewed the patient's imaging with Dr. Quinones (in anticipation of potential biopsy today) who felt that the mass was likely originating from the right ovary.  For this reason, the biopsy was cancelled.  I will touch base with Gyn-Onc re: consultation.      Plan:   - Cancel biopsy today  - Will touch base with gyn-onc re: consultation    Aline Rene MD - 10/3/2018, 11:26 AM          "

## 2018-10-03 NOTE — PROGRESS NOTES
Discharge Planning Assessment  [facility]     Patient Name: Issa Farmer  MRN: [id number]  Today's Date: 10/3/2018    Admit Date: 10/2/2018          Discharge Needs Assessment     Row Name 10/03/18 1035       Living Environment    Lives With alone    Current Living Arrangements home/apartment/condo    Primary Care Provided by self    Provides Primary Care For no one    Family Caregiver if Needed child(bere), adult    Quality of Family Relationships involved;helpful    Able to Return to Prior Arrangements yes       Resource/Environmental Concerns    Resource/Environmental Concerns none       Transition Planning    Patient/Family Anticipates Transition to home    Patient/Family Anticipated Services at Transition none    Transportation Anticipated family or friend will provide       Discharge Needs Assessment    Readmission Within the Last 30 Days no previous admission in last 30 days    Concerns to be Addressed no discharge needs identified;denies needs/concerns at this time    Equipment Currently Used at Home none    Anticipated Changes Related to Illness none            Discharge Plan     Row Name 10/03/18 1036       Plan    Plan home    Patient/Family in Agreement with Plan yes    Plan Comments Pt lives in [place] alone. Her adult granddaughter stays with her as needed. Pt owns a cane and walker but reports she does not need to use them. She is followed by her PCP and her medications are covered by insurance. Her goal for discharge is to return home. She denies any discharge needs at this time.        Destination     No service coordination in this encounter.      Durable Medical Equipment     No service coordination in this encounter.      Dialysis/Infusion     No service coordination in this encounter.      Home Medical Care     No service coordination in this encounter.      Social Care     No service coordination in this encounter.                Demographic Summary     Row Name 10/03/18 1035        General Information    Admission Type inpatient    Referral Source physician    Reason for Consult discharge planning    General Information Comments PCP Yan Unger        Contact Information    Permission Granted to Share Info With ;family/designee    Contact Information Comments Karen Schwab  [phone]            Functional Status     Row Name 10/03/18 1035       Functional Status, IADL    Medications independent    Meal Preparation independent    Housekeeping independent    Laundry independent    Shopping independent       Mental Status    General Appearance WDL WDL       Mental Status Summary    Recent Changes in Mental Status/Cognitive Functioning no changes            Psychosocial    No documentation.           Abuse/Neglect    No documentation.           Legal    No documentation.           Substance Abuse    No documentation.           Patient Forms    No documentation.         Michelle Connell, RN

## 2018-10-04 ENCOUNTER — APPOINTMENT (OUTPATIENT)
Dept: GENERAL RADIOLOGY | Facility: HOSPITAL | Age: 73
End: 2018-10-04

## 2018-10-04 LAB
ANION GAP SERPL CALCULATED.3IONS-SCNC: 7 MMOL/L (ref 3–11)
BUN BLD-MCNC: 6 MG/DL (ref 9–23)
BUN/CREAT SERPL: 14.6 (ref 7–25)
CALCIUM SPEC-SCNC: 8.4 MG/DL (ref 8.7–10.4)
CHLORIDE SERPL-SCNC: 101 MMOL/L (ref 99–109)
CO2 SERPL-SCNC: 30 MMOL/L (ref 20–31)
CREAT BLD-MCNC: 0.41 MG/DL (ref 0.6–1.3)
GFR SERPL CREATININE-BSD FRML MDRD: >150 ML/MIN/1.73
GLUCOSE BLD-MCNC: 93 MG/DL (ref 70–100)
POTASSIUM BLD-SCNC: 3.9 MMOL/L (ref 3.5–5.5)
SODIUM BLD-SCNC: 138 MMOL/L (ref 132–146)

## 2018-10-04 PROCEDURE — 80048 BASIC METABOLIC PNL TOTAL CA: CPT | Performed by: INTERNAL MEDICINE

## 2018-10-04 PROCEDURE — 99232 SBSQ HOSP IP/OBS MODERATE 35: CPT | Performed by: INTERNAL MEDICINE

## 2018-10-04 PROCEDURE — 71045 X-RAY EXAM CHEST 1 VIEW: CPT

## 2018-10-04 PROCEDURE — 93010 ELECTROCARDIOGRAM REPORT: CPT | Performed by: INTERNAL MEDICINE

## 2018-10-04 PROCEDURE — 99232 SBSQ HOSP IP/OBS MODERATE 35: CPT | Performed by: OBSTETRICS & GYNECOLOGY

## 2018-10-04 PROCEDURE — 93005 ELECTROCARDIOGRAM TRACING: CPT | Performed by: OBSTETRICS & GYNECOLOGY

## 2018-10-04 PROCEDURE — 25010000002 HYDROMORPHONE PER 4 MG: Performed by: INTERNAL MEDICINE

## 2018-10-04 RX ORDER — LOSARTAN POTASSIUM 25 MG/1
25 TABLET ORAL ONCE
Status: COMPLETED | OUTPATIENT
Start: 2018-10-04 | End: 2018-10-04

## 2018-10-04 RX ORDER — MAGNESIUM CARB/ALUMINUM HYDROX 105-160MG
150 TABLET,CHEWABLE ORAL ONCE
Status: DISCONTINUED | OUTPATIENT
Start: 2018-10-04 | End: 2018-10-08

## 2018-10-04 RX ORDER — LOSARTAN POTASSIUM 50 MG/1
50 TABLET ORAL
Status: DISCONTINUED | OUTPATIENT
Start: 2018-10-05 | End: 2018-10-11 | Stop reason: HOSPADM

## 2018-10-04 RX ORDER — ACETAMINOPHEN 325 MG/1
650 TABLET ORAL EVERY 6 HOURS PRN
Status: DISCONTINUED | OUTPATIENT
Start: 2018-10-04 | End: 2018-10-08

## 2018-10-04 RX ORDER — OXYCODONE HYDROCHLORIDE 5 MG/1
5 TABLET ORAL EVERY 4 HOURS PRN
Status: DISCONTINUED | OUTPATIENT
Start: 2018-10-04 | End: 2018-10-11 | Stop reason: HOSPADM

## 2018-10-04 RX ORDER — IBUPROFEN 600 MG/1
600 TABLET ORAL EVERY 6 HOURS PRN
Status: DISCONTINUED | OUTPATIENT
Start: 2018-10-04 | End: 2018-10-11 | Stop reason: HOSPADM

## 2018-10-04 RX ADMIN — DOCUSATE SODIUM,SENNOSIDES 2 TABLET: 50; 8.6 TABLET, FILM COATED ORAL at 08:22

## 2018-10-04 RX ADMIN — LOSARTAN POTASSIUM 25 MG: 25 TABLET, FILM COATED ORAL at 10:10

## 2018-10-04 RX ADMIN — GLYCERIN 2 G: 2 SUPPOSITORY RECTAL at 20:24

## 2018-10-04 RX ADMIN — HYDROMORPHONE HYDROCHLORIDE 0.5 MG: 1 INJECTION, SOLUTION INTRAMUSCULAR; INTRAVENOUS; SUBCUTANEOUS at 12:46

## 2018-10-04 RX ADMIN — HYDROCODONE BITARTRATE AND ACETAMINOPHEN 1 TABLET: 5; 325 TABLET ORAL at 04:18

## 2018-10-04 RX ADMIN — LOSARTAN POTASSIUM 25 MG: 25 TABLET, FILM COATED ORAL at 08:22

## 2018-10-04 RX ADMIN — DOCUSATE SODIUM,SENNOSIDES 2 TABLET: 50; 8.6 TABLET, FILM COATED ORAL at 20:24

## 2018-10-04 RX ADMIN — HYDROMORPHONE HYDROCHLORIDE 0.5 MG: 1 INJECTION, SOLUTION INTRAMUSCULAR; INTRAVENOUS; SUBCUTANEOUS at 08:22

## 2018-10-04 RX ADMIN — BISACODYL 10 MG: 5 TABLET, COATED ORAL at 14:03

## 2018-10-04 RX ADMIN — Medication 3 ML: at 20:24

## 2018-10-04 RX ADMIN — IBUPROFEN 600 MG: 600 TABLET ORAL at 10:12

## 2018-10-04 RX ADMIN — Medication 3 ML: at 08:24

## 2018-10-04 NOTE — PROGRESS NOTES
Gynecologic Oncology   Daily Progress Note    Subjective   No acute events overnight. Patient reports continued pain with some relief from IV medication. She is not having nausea or emesis.  She is tolerating a clear liquid diet.  She is voiding spontaneously and is passing gas.  She is minimally ambulating.  She is using her incentive spirometer.        Objective   Temp:  [98.4 °F (36.9 °C)-98.7 °F (37.1 °C)] 98.4 °F (36.9 °C)  Heart Rate:  [73-84] 77  Resp:  [16-22] 16  BP: (138-171)/(65-74) 145/74  Vitals:    10/04/18 0412   BP: 145/74   Pulse: 77   Resp: 16   Temp: 98.4 °F (36.9 °C)   SpO2: 93%     I/O last 3 completed shifts:  In: 3402 [P.O.:360; I.V.:3042]  Out: 3552 [Urine:3552]     GENERAL: Alert, well-appearing female in no apparent distress.  She appears her stated age.  She is here with her daughter.  HEENT: Sclera anicteric, normal eyelids. Head normocephalic, atraumatic. Mucus membranes moist.  Normal dentition.    NECK: Trachea midline, supple, without masses.  No thyromegaly.      CARDIOVASCULAR: Normal rate, regular rhythm, no murmurs, rubs, or gallops.  Extremities symmetric.  No peripheral edema.  RESPIRATORY: Clear to auscultation bilaterally, normal respiratory effort  GASTROINTESTINAL:  Soft, tender to palpation in bilateral lower quadrants, non-distended, no rebound or guarding. Large RT sided abdominal mass appreciated extending above umbilicus.   MUSCULOSKELETAL:  Normal gait and station.  FROMx4.  No digital cyanosis.    SKIN:  Warm, dry, well-perfused.  All visible areas intact.  No rashes, lesions, ulcers.  PSYCHIATRIC: AO x3, with appropriate affect, normal thought processes  LYMPHATICS:  No cervical or inguinal adenopathy noted.    Lab Results   Component Value Date    WBC 8.89 10/02/2018    HGB 14.0 10/02/2018    HCT 43.3 10/02/2018    MCV 84.6 10/02/2018     10/02/2018    NEUTROABS 7.46 10/02/2018    GLUCOSE 93 10/04/2018    BUN 6 (L) 10/04/2018    CREATININE 0.41 (L) 10/04/2018      10/04/2018    K 3.9 10/04/2018     10/04/2018    CO2 30.0 10/04/2018    CALCIUM 8.4 (L) 10/04/2018         Assessment/Plan   Issa Farmer is a 72 y.o. female with Abdominal pain and pelvic masses     # Pelvic mass  -CT 10/2/2018 images reviewed by Dr. Watts and myself. Two separate masses appreciated on imaging and physical examination likely originating from the ovary. Findings discussed with patient. Mass concerning for cancerous etiology given complex appearing with cystic and solid components.   -Ca 125 mildly elevated at 40.1  -CEA 2.10 wnl  -Due to concerning imaging findings, mildly elevated , physical examination and clinical picture surgical intervention is necessary at this time.  -We discussed proceeding with Total Abdominal Hysterectomy, Bilateral Salpingo-Oophorectomy, cancer staging/debulking with possible bowel resection to be determined based on frozen section at the time of surgery.   -Risks and benefits discussion will be performed prior to surgery.   -Tentitively planning to proceed with operation on Monday 10/8/18  -PO pain control with IV for breakthrough  -will require bowel prep 10/7 and ERAS protocol, EKG and CXR today for pre-op evaluation      # HTN  -Stable at this time on losartan     #FEN/PPX  -SQH or ppx lovenox until 10/8/18  -Diet as tolerated , Advance to regular this AM     # Dispo:  -Anticipate surgical intervention Monday 10/8/18  -Will need to be NPO/IVF at 0001 10/8/18, bowel prep     # Personal /family h/o cancer  -Consider genetics depending on final path          Ajit Michael MD  10/04/18  7:13 AM       Patient was seen and examined with Dr. Michael,  resident, who performed portions of the examination and documentation for this patient's care under my direct supervision.  I agree with the above documentation and plan.    Regular diet +/- nutritional supplements.     Patient with pain control problems.  We'll add Tylenol and changed to oxycodone  5-10 mg as needed for pain.    Will request case and consent.      Questions regarding anesthesia: GETA + block discussed    Discussed if pain control improves, patient could be discharged with plan for OR Monday.  Patient with concerns about pain control - will continue to address.  Deysi Watts MD  10/04/18  8:58 AM

## 2018-10-04 NOTE — PLAN OF CARE
Problem: Patient Care Overview  Goal: Plan of Care Review  Outcome: Ongoing (interventions implemented as appropriate)   10/04/18 0334   Coping/Psychosocial   Plan of Care Reviewed With patient   OTHER   Outcome Summary VSS no overnight events. patient states pain much improved since admission, requires infrequent dosing of prn norco with good relief. adeq UOP, feels constipated, bowel regimen per MAR. plan for JUANA/BSO/debulking/staging Monday 10/8 with Dr. Watts.    Plan of Care Review   Progress improving     Goal: Individualization and Mutuality  Outcome: Ongoing (interventions implemented as appropriate)    Goal: Discharge Needs Assessment  Outcome: Ongoing (interventions implemented as appropriate)    Goal: Interprofessional Rounds/Family Conf  Outcome: Ongoing (interventions implemented as appropriate)      Problem: Pain, Acute (Adult)  Goal: Identify Related Risk Factors and Signs and Symptoms  Outcome: Ongoing (interventions implemented as appropriate)    Goal: Acceptable Pain Control/Comfort Level  Outcome: Ongoing (interventions implemented as appropriate)

## 2018-10-04 NOTE — PROGRESS NOTES
[facility] Medicine Services  PROGRESS NOTE    Patient Name: Issa Farmer  : 1945  MRN: [id number]    Date of Admission: 10/2/2018  Length of Stay: 2  Primary Care Physician: Yan Unger DO    Subjective   Subjective     CC:  abd pain, pelvic mass    HPI:  Still no bm. No dyspnea. Ambulated. Abdominal pain slightly improved but still bloated. + flatus  Review of Systems  No headache    Otherwise ROS is negative except as mentioned in the HPI.    Objective   Objective     Vital Signs:   Temp:  [97.7 °F (36.5 °C)-98.6 °F (37 °C)] 98.1 °F (36.7 °C)  Heart Rate:  [72-83] 83  Resp:  [16-19] 16  BP: (145-188)/(66-80) 154/67        Physical Exam:  Constitutional:Alert, oriented x 3, nontoxic appearing  Psych:Normal/appropriate affect  HEENT:Ncat, oroph clear  Neck: neck supple, full range of motion  Neuro: Face symmetric, speech clear, equal , moves all extremities  Cardiac: Rrr; No pretibial pitting edema  Resp: Ctab, normal effort  GI: abd soft, nontender to palpation, mild distension, no rebound, no guarding  Skin: No extremity rash  Musculoskeletal/extremities: no cyanosis extremities; no significant ankle edema            Results Reviewed:  I have personally reviewed current lab, radiology, and data and agree.      Results from last 7 days  Lab Units 10/03/18  0324 10/02/18  0942   WBC 10*3/mm3  --  8.89   HEMOGLOBIN g/dL  --  14.0   HEMATOCRIT %  --  43.3   PLATELETS 10*3/mm3  --  215   INR  1.04  --        Results from last 7 days  Lab Units 10/04/18  0555 10/03/18  0324 10/02/18  0942   SODIUM mmol/L 138 139 136   POTASSIUM mmol/L 3.9 4.3 3.7   CHLORIDE mmol/L 101 105 98*   CO2 mmol/L 30.0 28.0 30.0   BUN mg/dL 6* 11 16   CREATININE mg/dL 0.41* 0.45* 0.53*   GLUCOSE mg/dL 93 89 126*   CALCIUM mg/dL 8.4* 8.2* 9.5   ALT (SGPT) U/L  --  12 21   AST (SGOT) U/L  --  17 20     Estimated Creatinine Clearance: 63.9 mL/min (A) (by C-G formula based on SCr of 0.41  mg/dL (L)).  No results found for: BNP    Microbiology Results Abnormal     None          Imaging Results (last 24 hours)     Procedure Component Value Units Date/Time    XR Chest 1 View [[id number]] Collected:  10/04/18 0951     Updated:  10/04/18 1300    Narrative:       EXAMINATION: XR CHEST 1 VW-      INDICATION: Preop clearance; R10.31-Right lower quadrant pain;  R10.32-Left lower quadrant pain; R19.00-Intraabdominal and pelvic  swelling, mass and lump, unspecified site.      COMPARISON: None.     FINDINGS: Cardiac silhouette is normal. There is no pulmonary  inflammatory process, mass or effusion.           Impression:       Chronic appearing pulmonary findings with no acute disease.     D:  10/04/2018  E:  10/04/2018     This report was finalized on 10/4/2018 12:58 PM by Dr. Saul Quinones MD.                I have reviewed the medications.      [START ON 10/5/2018] losartan 50 mg Oral Q24H   sennosides-docusate sodium 2 tablet Oral BID   sodium chloride 3 mL Intravenous Q12H         Assessment/Plan   Assessment / Plan     Hospital Problem List     * (Principal)Acute bilateral lower abdominal pain    Pelvic mass    HTN (hypertension)             Brief Hospital Course to date:  Issa Farmer is a 72 y.o. female w/ htn presented with abdominal pain. Ct imaging revealed pelvic mass. Dr. margy chapman w/ surgery consulted and originally ordered ct guided biopsy, which was subsequently cancelled as radiologist felt lesion might be originating from ovary.      Assessment:    *abdominal pain  *pelvic mass   -elevated ca 125   -normal cea level  *constipation  *htn  -----------------------------------  Plan:  -get bowels moving (glycerin, mag citrate 150cc, senokot-s)    -gyn onc following, plans surgery Monday    -bowel prep Sunday night   -gyn onc titrated opioids    -hold sq lovenox day of surgery    -if symptoms improve, get bowels moving, and if patient would prefer, could potentially discharge and come back  Monday for surgery.     -bmp in a.m.    DVT Prophylaxis: lovenox sq    CODE STATUS:   Code Status and Medical Interventions:   Ordered at: 10/02/18 1153     Code Status:    CPR     Medical Interventions (Level of Support Prior to Arrest):    Full       Disposition: I expect the patient to be discharged unclear      Electronically signed by Nicholas Rhodes MD, 10/04/18, 6:06 PM.

## 2018-10-04 NOTE — PROGRESS NOTES
Patient has been seen and evaluated by the Gyn-Onc service who will plan to take the patient to the OR next week.  Will sign off.

## 2018-10-04 NOTE — PLAN OF CARE
Problem: Patient Care Overview  Goal: Plan of Care Review  Outcome: Ongoing (interventions implemented as appropriate)      Problem: Pain, Acute (Adult)  Goal: Identify Related Risk Factors and Signs and Symptoms  Outcome: Ongoing (interventions implemented as appropriate)   10/03/18 1608   Pain, Acute (Adult)   Related Risk Factors (Acute Pain) persistent pain;patient perception   Signs and Symptoms (Acute Pain) verbalization of pain descriptors     Goal: Acceptable Pain Control/Comfort Level  Outcome: Ongoing (interventions implemented as appropriate)   10/04/18 1521   Pain, Acute (Adult)   Acceptable Pain Control/Comfort Level making progress toward outcome       Problem: Hysterectomy (Adult)  Goal: Signs and Symptoms of Listed Potential Problems Will be Absent, Minimized or Managed (Hysterectomy)  Outcome: Ongoing (interventions implemented as appropriate)   10/04/18 1521   Goal/Outcome Evaluation   Problems Assessed (Hysterectomy) all   Problems Present (Hysterectomy) none

## 2018-10-05 ENCOUNTER — ANESTHESIA EVENT (OUTPATIENT)
Dept: PERIOP | Facility: HOSPITAL | Age: 73
End: 2018-10-05

## 2018-10-05 LAB
ANION GAP SERPL CALCULATED.3IONS-SCNC: 7 MMOL/L (ref 3–11)
BUN BLD-MCNC: 7 MG/DL (ref 9–23)
BUN/CREAT SERPL: 14.9 (ref 7–25)
CALCIUM SPEC-SCNC: 9 MG/DL (ref 8.7–10.4)
CHLORIDE SERPL-SCNC: 100 MMOL/L (ref 99–109)
CO2 SERPL-SCNC: 30 MMOL/L (ref 20–31)
CREAT BLD-MCNC: 0.47 MG/DL (ref 0.6–1.3)
GFR SERPL CREATININE-BSD FRML MDRD: 130 ML/MIN/1.73
GLUCOSE BLD-MCNC: 103 MG/DL (ref 70–100)
POTASSIUM BLD-SCNC: 3.5 MMOL/L (ref 3.5–5.5)
SODIUM BLD-SCNC: 137 MMOL/L (ref 132–146)

## 2018-10-05 PROCEDURE — 80048 BASIC METABOLIC PNL TOTAL CA: CPT | Performed by: INTERNAL MEDICINE

## 2018-10-05 PROCEDURE — 99232 SBSQ HOSP IP/OBS MODERATE 35: CPT | Performed by: NURSE PRACTITIONER

## 2018-10-05 PROCEDURE — 99232 SBSQ HOSP IP/OBS MODERATE 35: CPT | Performed by: OBSTETRICS & GYNECOLOGY

## 2018-10-05 PROCEDURE — 25010000002 HYDROMORPHONE PER 4 MG: Performed by: INTERNAL MEDICINE

## 2018-10-05 PROCEDURE — 25010000002 ENOXAPARIN PER 10 MG: Performed by: INTERNAL MEDICINE

## 2018-10-05 RX ORDER — PEG-3350, SODIUM SULFATE, SODIUM CHLORIDE, POTASSIUM CHLORIDE, SODIUM ASCORBATE AND ASCORBIC ACID 7.5-2.691G
1000 KIT ORAL ONCE
Status: COMPLETED | OUTPATIENT
Start: 2018-10-07 | End: 2018-10-07

## 2018-10-05 RX ORDER — METRONIDAZOLE 500 MG/1
500 TABLET ORAL 3 TIMES DAILY
Status: COMPLETED | OUTPATIENT
Start: 2018-10-07 | End: 2018-10-07

## 2018-10-05 RX ORDER — NEOMYCIN SULFATE 500 MG/1
1000 TABLET ORAL 3 TIMES DAILY
Status: COMPLETED | OUTPATIENT
Start: 2018-10-07 | End: 2018-10-07

## 2018-10-05 RX ORDER — MAGNESIUM CARB/ALUMINUM HYDROX 105-160MG
150 TABLET,CHEWABLE ORAL ONCE
Status: COMPLETED | OUTPATIENT
Start: 2018-10-05 | End: 2018-10-05

## 2018-10-05 RX ADMIN — Medication 150 ML: at 05:33

## 2018-10-05 RX ADMIN — ACETAMINOPHEN 650 MG: 325 TABLET ORAL at 21:35

## 2018-10-05 RX ADMIN — LOSARTAN POTASSIUM 50 MG: 50 TABLET ORAL at 09:15

## 2018-10-05 RX ADMIN — HYDROMORPHONE HYDROCHLORIDE 0.5 MG: 1 INJECTION, SOLUTION INTRAMUSCULAR; INTRAVENOUS; SUBCUTANEOUS at 09:15

## 2018-10-05 RX ADMIN — ENOXAPARIN SODIUM 40 MG: 40 INJECTION SUBCUTANEOUS at 05:33

## 2018-10-05 RX ADMIN — DOCUSATE SODIUM,SENNOSIDES 2 TABLET: 50; 8.6 TABLET, FILM COATED ORAL at 21:35

## 2018-10-05 RX ADMIN — Medication 10 ML: at 09:16

## 2018-10-05 RX ADMIN — BISACODYL 10 MG: 5 TABLET, COATED ORAL at 05:33

## 2018-10-05 RX ADMIN — POLYETHYLENE GLYCOL 3350 17 G: 17 POWDER, FOR SOLUTION ORAL at 14:41

## 2018-10-05 RX ADMIN — Medication 3 ML: at 09:17

## 2018-10-05 RX ADMIN — ACETAMINOPHEN 650 MG: 325 TABLET ORAL at 14:34

## 2018-10-05 RX ADMIN — Medication 3 ML: at 21:35

## 2018-10-05 NOTE — PLAN OF CARE
Problem: Patient Care Overview  Goal: Plan of Care Review  Outcome: Ongoing (interventions implemented as appropriate)   10/05/18 9796   Coping/Psychosocial   Plan of Care Reviewed With patient   OTHER   Outcome Summary Watery stools several times today. pain managed with minimal medications.   Plan of Care Review   Progress improving

## 2018-10-05 NOTE — PROGRESS NOTES
[facility] Medicine Services  PROGRESS NOTE    Patient Name: Issa Farmer  : 1945  MRN: [id number]    Date of Admission: 10/2/2018  Length of Stay: 3  Primary Care Physician: Yan Unger DO    Subjective   Subjective     CC:  abd pain, pelvic mass    HPI:  Resting in bed.  Bowels moving now.  Still with abdominal pain requiring IV pain medication.  Hesitant to go home prior to surgery on Monday because of pain.  No nausea or vomiting.        Review of Systems  Gen- No fevers, chills  CV- No chest pain, palpitations  Resp- No cough, dyspnea  GI- No N/V/D, abd pain    Otherwise ROS is negative except as mentioned in the HPI.    Objective   Objective     Vital Signs:   Temp:  [97.7 °F (36.5 °C)-99.3 °F (37.4 °C)] 99.3 °F (37.4 °C)  Heart Rate:  [72-97] 97  Resp:  [16-18] 18  BP: (141-181)/(65-74) 160/74        Physical Exam:  Constitutional:Alert, oriented x 3, nontoxic appearing  Psych:Normal/appropriate affect  HEENT:Ncat, oroph clear  Neck: neck supple, full range of motion  Neuro: Face symmetric, speech clear, equal , moves all extremities  Cardiac: Rrr; No pretibial pitting edema  Resp: Ctab, normal effort  GI: abd soft, tender LLQ, mild distension, no rebound, no guarding  Skin: No extremity rash  Musculoskeletal/extremities: no cyanosis extremities; no significant ankle edema    Results Reviewed:  I have personally reviewed current lab, radiology, and data and agree.      Results from last 7 days  Lab Units 10/03/18  0324 10/02/18  0942   WBC 10*3/mm3  --  8.89   HEMOGLOBIN g/dL  --  14.0   HEMATOCRIT %  --  43.3   PLATELETS 10*3/mm3  --  215   INR  1.04  --        Results from last 7 days  Lab Units 10/05/18  0552 10/04/18  0555 10/03/18  0324 10/02/18  0942   SODIUM mmol/L 137 138 139 136   POTASSIUM mmol/L 3.5 3.9 4.3 3.7   CHLORIDE mmol/L 100 101 105 98*   CO2 mmol/L 30.0 30.0 28.0 30.0   BUN mg/dL 7* 6* 11 16   CREATININE mg/dL 0.47* 0.41* 0.45* 0.53*    GLUCOSE mg/dL 103* 93 89 126*   CALCIUM mg/dL 9.0 8.4* 8.2* 9.5   ALT (SGPT) U/L  --   --  12 21   AST (SGOT) U/L  --   --  17 20     Estimated Creatinine Clearance: 63.9 mL/min (A) (by C-G formula based on SCr of 0.47 mg/dL (L)).  No results found for: BNP    Microbiology Results Abnormal     None        Imaging Results (last 24 hours)     Procedure Component Value Units Date/Time    XR Chest 1 View [[id number]] Collected:  10/04/18 0951     Updated:  10/04/18 1300    Narrative:       EXAMINATION: XR CHEST 1 VW-      INDICATION: Preop clearance; R10.31-Right lower quadrant pain;  R10.32-Left lower quadrant pain; R19.00-Intraabdominal and pelvic  swelling, mass and lump, unspecified site.      COMPARISON: None.     FINDINGS: Cardiac silhouette is normal. There is no pulmonary  inflammatory process, mass or effusion.           Impression:       Chronic appearing pulmonary findings with no acute disease.     D:  10/04/2018  E:  10/04/2018     This report was finalized on 10/4/2018 12:58 PM by Dr. Saul Quinones MD.              I have reviewed the medications.      enoxaparin 40 mg Subcutaneous Q24H   losartan 50 mg Oral Q24H   magnesium citrate 150 mL Oral Once   sennosides-docusate sodium 2 tablet Oral BID   sodium chloride 3 mL Intravenous Q12H         Assessment/Plan   Assessment / Plan     Hospital Problem List     * (Principal)Acute bilateral lower abdominal pain    Pelvic mass    HTN (hypertension)          Brief Hospital Course to date:  Issa Farmer is a 72 y.o. female w/ htn presented with abdominal pain. Ct imaging revealed pelvic mass. Dr. margy chapman w/ surgery consulted and originally ordered ct guided biopsy, which was subsequently cancelled as radiologist felt lesion might be originating from ovary.      Assessment:    *abdominal pain  *pelvic mass   -elevated ca 125   -normal cea level  *constipation  *htn  -----------------------------------  Plan:    -bowels moving now    -gyn onc following,  plans surgery Monday    -bowel prep Sunday night   -gyn onc titrating opioids    -hold sq lovenox day of surgery    -if symptoms improve and if patient would prefer, could potentially discharge and come back Monday for surgery, currently patient thinks she will just stay until surgery due concerns about pain control      DVT Prophylaxis: lovenox sq    CODE STATUS:   Code Status and Medical Interventions:   Ordered at: 10/02/18 1153     Code Status:    CPR     Medical Interventions (Level of Support Prior to Arrest):    Full       Disposition: I expect the patient to be discharged unclear      Electronically signed by TRICIA Loya, 10/05/18, 11:07 AM.

## 2018-10-05 NOTE — PROGRESS NOTES
Gynecologic Oncology   Daily Progress Note    Subjective   No acute events overnight.  Her pain is controlled with PO medication.  She is not having nausea or emesis.  She is tolerating a regular diet.  She is voiding spontaneously and is passing gas.  She is ambulating.        Objective   Temp:  [97.7 °F (36.5 °C)-98.6 °F (37 °C)] 98.6 °F (37 °C)  Heart Rate:  [72-89] 80  Resp:  [16] 16  BP: (141-188)/(65-80) 146/70  Vitals:    10/05/18 0346   BP: 146/70   Pulse: 80   Resp: 16   Temp: 98.6 °F (37 °C)   SpO2: 92%     I/O last 3 completed shifts:  In: 1860 [P.O.:860; I.V.:1000]  Out: 3750 [Urine:3750]     GENERAL: Alert, well-appearing female in no apparent distress.  She appears her stated age.  She is here with her daughter.  HEENT: Sclera anicteric, normal eyelids. Head normocephalic, atraumatic. Mucus membranes moist.  Normal dentition.    NECK: Trachea midline, supple, without masses.  No thyromegaly.      CARDIOVASCULAR: Normal rate, regular rhythm, no murmurs, rubs, or gallops.  Extremities symmetric.  No peripheral edema.  RESPIRATORY: Clear to auscultation bilaterally, normal respiratory effort  GASTROINTESTINAL:  Soft, tender to palpation in bilateral lower quadrants, non-distended, no rebound or guarding. Large RT sided abdominal mass appreciated extending above umbilicus.   MUSCULOSKELETAL:  Normal gait and station.  FROMx4.  No digital cyanosis.    SKIN:  Warm, dry, well-perfused.  All visible areas intact.  No rashes, lesions, ulcers.  PSYCHIATRIC: AO x3, with appropriate affect, normal thought processes  LYMPHATICS:  No cervical or inguinal adenopathy noted.    Lab Results   Component Value Date    WBC 8.89 10/02/2018    HGB 14.0 10/02/2018    HCT 43.3 10/02/2018    MCV 84.6 10/02/2018     10/02/2018    NEUTROABS 7.46 10/02/2018    GLUCOSE 103 (H) 10/05/2018    BUN 7 (L) 10/05/2018    CREATININE 0.47 (L) 10/05/2018     10/05/2018    K 3.5 10/05/2018     10/05/2018    CO2 30.0  10/05/2018    CALCIUM 9.0 10/05/2018         Assessment/Plan   Issa Farmer is a 72 y.o. femalewith Abdominal pain and pelvic masses     # Pelvic mass  -CT 10/2/2018 images reviewed by Dr. Watts and myself. Two separate masses appreciated on imaging and physical examination likely originating from the ovary. Findings discussed with patient. Mass concerning for cancerous etiology given complex appearing with cystic and solid components.   -Ca 125 mildly elevated at 40.1  -CEA 2.10 wnl  -Due to concerning imaging findings, mildly elevated , physical examination and clinical picture surgical intervention is necessary at this time.  -We discussed proceeding with Total Abdominal Hysterectomy, Bilateral Salpingo-Oophorectomy, cancer staging/debulking with possible bowel resection to be determined based on frozen section at the time of surgery.   -Risks and benefits discussion will be performed prior to surgery.   -Tentitively planning to proceed with operation on Monday 10/8/18  -PO pain control with IV for breakthrough  -will require bowel prep 10/7 and ERAS protocol, EKG and CXR today for pre-op evaluation      # HTN  -Labile overnight. Within normal limits this AM  -Continue home losartan  -Primary team managing     #FEN/PPX  -SQH or ppx lovenox until 10/8/18  -Regular diet +/- nutritional supplements.      # Dispo:  -Anticipate surgical intervention Monday 10/8/18  -Will need to be NPO/IVF at 0001 10/8/18  -bowel prep 10/7  -clear liquids Saturday/Sunday  -Discussed if pain control improves, patient could be discharged with plan for OR Monday.  Patient with concerns about pain control - will continue to address.     # Personal /family h/o cancer  -Consider genetics depending on final path         Ajit Michael MD  10/05/18  7:04 AM     I saw and evaluated the patient. I agree with the findings and the plan of care as documented in the note.    Deysi Watts MD  10/05/18  4:35 PM

## 2018-10-05 NOTE — PLAN OF CARE
Problem: Patient Care Overview  Goal: Plan of Care Review  Outcome: Ongoing (interventions implemented as appropriate)   10/05/18 0330   Coping/Psychosocial   Plan of Care Reviewed With patient;family   OTHER   Outcome Summary VSS no overnight events, rested well. plan for surgery 10/8. awaiting BM - meds given per MAR. some pain, improved with prn meds.    Plan of Care Review   Progress improving     Goal: Individualization and Mutuality  Outcome: Ongoing (interventions implemented as appropriate)    Goal: Discharge Needs Assessment  Outcome: Ongoing (interventions implemented as appropriate)    Goal: Interprofessional Rounds/Family Conf  Outcome: Ongoing (interventions implemented as appropriate)      Problem: Pain, Acute (Adult)  Goal: Identify Related Risk Factors and Signs and Symptoms  Outcome: Ongoing (interventions implemented as appropriate)    Goal: Acceptable Pain Control/Comfort Level  Outcome: Ongoing (interventions implemented as appropriate)

## 2018-10-05 NOTE — PROGRESS NOTES
Continued Stay Note  [facility]     Patient Name: Issa Farmer  MRN: [id number]  Today's Date: 10/5/2018    Admit Date: 10/2/2018          Discharge Plan     Row Name 10/05/18 1431       Plan    Plan Home with family assistance        Plan Comments     I spoke with Ms. Farmer and her sister, Skyla Aguiar,  who was at her bedside. Skyla plans to stay with her at discharge. 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 We briefly discussed discharge planning needs. I will see again on Monday afternoon.                   Discharge Codes    No documentation.       Expected Discharge Date and Time     Expected Discharge Date Expected Discharge Time    Oct 10, 2018             Venecia Mandujano RN

## 2018-10-06 PROCEDURE — 99232 SBSQ HOSP IP/OBS MODERATE 35: CPT | Performed by: NURSE PRACTITIONER

## 2018-10-06 PROCEDURE — 25010000002 ENOXAPARIN PER 10 MG: Performed by: INTERNAL MEDICINE

## 2018-10-06 RX ORDER — BENZONATATE 100 MG/1
100 CAPSULE ORAL 3 TIMES DAILY PRN
Status: DISCONTINUED | OUTPATIENT
Start: 2018-10-06 | End: 2018-10-11 | Stop reason: HOSPADM

## 2018-10-06 RX ADMIN — Medication 3 ML: at 09:31

## 2018-10-06 RX ADMIN — BENZONATATE 100 MG: 100 CAPSULE ORAL at 15:44

## 2018-10-06 RX ADMIN — ENOXAPARIN SODIUM 40 MG: 40 INJECTION SUBCUTANEOUS at 06:18

## 2018-10-06 RX ADMIN — DOCUSATE SODIUM,SENNOSIDES 2 TABLET: 50; 8.6 TABLET, FILM COATED ORAL at 09:28

## 2018-10-06 RX ADMIN — DOCUSATE SODIUM,SENNOSIDES 2 TABLET: 50; 8.6 TABLET, FILM COATED ORAL at 21:55

## 2018-10-06 RX ADMIN — Medication 3 ML: at 21:55

## 2018-10-06 RX ADMIN — POLYETHYLENE GLYCOL 3350 17 G: 17 POWDER, FOR SOLUTION ORAL at 09:31

## 2018-10-06 RX ADMIN — ACETAMINOPHEN 650 MG: 325 TABLET ORAL at 15:44

## 2018-10-06 RX ADMIN — LOSARTAN POTASSIUM 50 MG: 50 TABLET ORAL at 09:28

## 2018-10-06 NOTE — PROGRESS NOTES
[facility] Medicine Services  PROGRESS NOTE    Patient Name: Issa Farmer  : 1945  MRN: [id number]    Date of Admission: 10/2/2018  Length of Stay: 4  Primary Care Physician: Yan Unger DO    Subjective   Subjective     CC:  abd pain, pelvic mass    HPI:  Resting on bedside finishing breakfast on exam. Tolerating clear liquids. States pain has improved and did better overnight. States she has a dry cough, no soa, or wheezing    Review of Systems  Gen- No fevers, chills  CV- No chest pain, palpitations  Resp- No cough, dyspnea  GI- No N/V/D, abd pain    Otherwise ROS is negative except as mentioned in the HPI.    Objective   Objective     Vital Signs:   Temp:  [97.7 °F (36.5 °C)-99 °F (37.2 °C)] 97.7 °F (36.5 °C)  Heart Rate:  [76-84] 82  Resp:  [18] 18  BP: (121-151)/(56-70) 151/70        Physical Exam:  Constitutional:Alert, oriented x 3, nontoxic appearing  Psych:Normal/appropriate affect  HEENT:Ncat, oroph clear  Neck: neck supple, full range of motion  Neuro: Face symmetric, speech clear, equal , moves all extremities  Cardiac: Rrr; No edema  Resp: Ctab, normal effort  GI: abd soft, tender LLQ, mild distension, no rebound, no guarding  Skin: No extremity rash  Musculoskeletal/extremities: no cyanosis extremities; no significant ankle edema    Results Reviewed:  I have personally reviewed current lab, radiology, and data and agree.      Results from last 7 days  Lab Units 10/03/18  0324 10/02/18  0942   WBC 10*3/mm3  --  8.89   HEMOGLOBIN g/dL  --  14.0   HEMATOCRIT %  --  43.3   PLATELETS 10*3/mm3  --  215   INR  1.04  --        Results from last 7 days  Lab Units 10/05/18  0552 10/04/18  0555 10/03/18  0324 10/02/18  0942   SODIUM mmol/L 137 138 139 136   POTASSIUM mmol/L 3.5 3.9 4.3 3.7   CHLORIDE mmol/L 100 101 105 98*   CO2 mmol/L 30.0 30.0 28.0 30.0   BUN mg/dL 7* 6* 11 16   CREATININE mg/dL 0.47* 0.41* 0.45* 0.53*   GLUCOSE mg/dL 103* 93 89 126*    CALCIUM mg/dL 9.0 8.4* 8.2* 9.5   ALT (SGPT) U/L  --   --  12 21   AST (SGOT) U/L  --   --  17 20     Estimated Creatinine Clearance: 63.9 mL/min (A) (by C-G formula based on SCr of 0.47 mg/dL (L)).  No results found for: BNP    Microbiology Results Abnormal     None        Imaging Results (last 24 hours)     ** No results found for the last 24 hours. **           I have reviewed the medications.      enoxaparin 40 mg Subcutaneous Q24H   losartan 50 mg Oral Q24H   magnesium citrate 150 mL Oral Once   [START ON 10/7/2018] metroNIDAZOLE 500 mg Oral TID   [START ON 10/7/2018] neomycin 1,000 mg Oral TID   [START ON 10/7/2018] PEG-KCl-NaCl-NaSulf-Na Asc-C 1,000 mL Oral Once   Followed by      [START ON 10/7/2018] PEG-KCl-NaCl-NaSulf-Na Asc-C 1,000 mL Oral Once   sennosides-docusate sodium 2 tablet Oral BID   sodium chloride 3 mL Intravenous Q12H         Assessment/Plan   Assessment / Plan     Hospital Problem List     * (Principal)Acute bilateral lower abdominal pain    Pelvic mass    HTN (hypertension)          Brief Hospital Course to date:  Issa Farmer is a 72 y.o. female w/ htn presented with abdominal pain. Ct imaging revealed pelvic mass. Dr. margy chapman w/ surgery consulted and originally ordered ct guided biopsy, which was subsequently cancelled as radiologist felt lesion might be originating from ovary.      Assessment:    *abdominal pain  *pelvic mass   -elevated ca 125   -normal cea level  *constipation  *htn  -----------------------------------  Plan:    -constipation resolved    -gyn onc following, plans surgery Monday    -start clear liquids and continue over the weekend   -bowel prep Sunday for Monday surgery   -gyn onc titrating opioids- pain improving   - preop cxr/ ekg complete    -hold sq lovenox day of surgery    - tessalon perles and cough drops prn  - encourage IS/ ambulation      DVT Prophylaxis: lovenox sq    CODE STATUS:   Code Status and Medical Interventions:   Ordered at: 10/02/18 1159      Code Status:    CPR     Medical Interventions (Level of Support Prior to Arrest):    Full       Disposition: I expect the patient to be discharged TBD      Electronically signed by TRICIA Gomez, 10/06/18, 10:10 AM.

## 2018-10-06 NOTE — PLAN OF CARE
Problem: Patient Care Overview  Goal: Plan of Care Review  Outcome: Ongoing (interventions implemented as appropriate)   10/06/18 0456   Coping/Psychosocial   Plan of Care Reviewed With patient   OTHER   Outcome Summary VSS, pain controlled with oral medications. Pt slept well throughout the night. Clear liquid diet starts today. will continue to monitor.    Plan of Care Review   Progress no change     Goal: Individualization and Mutuality  Outcome: Ongoing (interventions implemented as appropriate)    Goal: Discharge Needs Assessment  Outcome: Ongoing (interventions implemented as appropriate)    Goal: Interprofessional Rounds/Family Conf  Outcome: Ongoing (interventions implemented as appropriate)      Problem: Pain, Acute (Adult)  Goal: Identify Related Risk Factors and Signs and Symptoms  Outcome: Ongoing (interventions implemented as appropriate)    Goal: Acceptable Pain Control/Comfort Level  Outcome: Ongoing (interventions implemented as appropriate)      Problem: Hysterectomy (Adult)  Goal: Signs and Symptoms of Listed Potential Problems Will be Absent, Minimized or Managed (Hysterectomy)  Outcome: Ongoing (interventions implemented as appropriate)    Goal: Anesthesia/Sedation Recovery  Outcome: Ongoing (interventions implemented as appropriate)

## 2018-10-07 PROCEDURE — 99232 SBSQ HOSP IP/OBS MODERATE 35: CPT | Performed by: NURSE PRACTITIONER

## 2018-10-07 PROCEDURE — 25010000002 ENOXAPARIN PER 10 MG: Performed by: INTERNAL MEDICINE

## 2018-10-07 RX ADMIN — METRONIDAZOLE 500 MG: 500 TABLET ORAL at 13:29

## 2018-10-07 RX ADMIN — POLYETHYLENE GLYCOL 3350, SODIUM SULFATE, SODIUM CHLORIDE, POTASSIUM CHLORIDE, ASCORBIC ACID, SODIUM ASCORBATE 1000 ML: KIT at 07:54

## 2018-10-07 RX ADMIN — ACETAMINOPHEN 650 MG: 325 TABLET ORAL at 07:56

## 2018-10-07 RX ADMIN — ENOXAPARIN SODIUM 40 MG: 40 INJECTION SUBCUTANEOUS at 06:12

## 2018-10-07 RX ADMIN — METRONIDAZOLE 500 MG: 500 TABLET ORAL at 15:20

## 2018-10-07 RX ADMIN — METRONIDAZOLE 500 MG: 500 TABLET ORAL at 21:01

## 2018-10-07 RX ADMIN — ACETAMINOPHEN 650 MG: 325 TABLET ORAL at 23:05

## 2018-10-07 RX ADMIN — POLYETHYLENE GLYCOL 3350, SODIUM SULFATE, SODIUM CHLORIDE, POTASSIUM CHLORIDE, ASCORBIC ACID, SODIUM ASCORBATE 1000 ML: KIT at 20:56

## 2018-10-07 RX ADMIN — ACETAMINOPHEN 650 MG: 325 TABLET ORAL at 16:41

## 2018-10-07 RX ADMIN — DOCUSATE SODIUM,SENNOSIDES 2 TABLET: 50; 8.6 TABLET, FILM COATED ORAL at 09:27

## 2018-10-07 RX ADMIN — Medication 3 ML: at 21:06

## 2018-10-07 RX ADMIN — LOSARTAN POTASSIUM 50 MG: 50 TABLET ORAL at 09:27

## 2018-10-07 RX ADMIN — NEOMYCIN SULFATE 1000 MG: 500 TABLET ORAL at 15:20

## 2018-10-07 RX ADMIN — NEOMYCIN SULFATE 1000 MG: 500 TABLET ORAL at 21:01

## 2018-10-07 RX ADMIN — NEOMYCIN SULFATE 1000 MG: 500 TABLET ORAL at 13:29

## 2018-10-07 RX ADMIN — Medication 3 ML: at 09:27

## 2018-10-07 RX ADMIN — DOCUSATE SODIUM,SENNOSIDES 2 TABLET: 50; 8.6 TABLET, FILM COATED ORAL at 20:57

## 2018-10-07 NOTE — PROGRESS NOTES
[facility] Medicine Services  PROGRESS NOTE    Patient Name: Issa Farmer  : 1945  MRN: [id number]    Date of Admission: 10/2/2018  Length of Stay: 5  Primary Care Physician: Yan Unger DO    Subjective   Subjective     CC:  abd pain, pelvic mass    HPI:  Patient sitting up in chair eating jello. Has finished first round of movi-prep. + BMs today. Pain is controlled. No nausea or vomiting.  Review of Systems  Gen- No fevers, chills  CV- No chest pain, palpitations  Resp- No cough, dyspnea  GI- No N/V/D, abd pain    Otherwise ROS is negative except as mentioned in the HPI.    Objective   Objective     Vital Signs:   Temp:  [97.9 °F (36.6 °C)-98.4 °F (36.9 °C)] 98.3 °F (36.8 °C)  Heart Rate:  [78-86] 86  Resp:  [16-18] 16  BP: (135-152)/(66-75) 138/75        Physical Exam:  Constitutional:Alert, oriented x 3, nontoxic appearing  Psych:Normal/appropriate affect  HEENT:Ncat, oroph clear  Neck: neck supple, full range of motion  Neuro: Face symmetric, speech clear, equal , moves all extremities  Cardiac: Rrr; No edema  Resp: Ctab, normal effort  GI: abd soft, tender LLQ, no distension, no rebound, no guarding  Skin: No extremity rash  Musculoskeletal/extremities: no cyanosis extremities; no significant ankle edema    Results Reviewed:  I have personally reviewed current lab, radiology, and data and agree.      Results from last 7 days  Lab Units 10/03/18  0324 10/02/18  0942   WBC 10*3/mm3  --  8.89   HEMOGLOBIN g/dL  --  14.0   HEMATOCRIT %  --  43.3   PLATELETS 10*3/mm3  --  215   INR  1.04  --        Results from last 7 days  Lab Units 10/05/18  0552 10/04/18  0555 10/03/18  0324 10/02/18  0942   SODIUM mmol/L 137 138 139 136   POTASSIUM mmol/L 3.5 3.9 4.3 3.7   CHLORIDE mmol/L 100 101 105 98*   CO2 mmol/L 30.0 30.0 28.0 30.0   BUN mg/dL 7* 6* 11 16   CREATININE mg/dL 0.47* 0.41* 0.45* 0.53*   GLUCOSE mg/dL 103* 93 89 126*   CALCIUM mg/dL 9.0 8.4* 8.2* 9.5    ALT (SGPT) U/L  --   --  12 21   AST (SGOT) U/L  --   --  17 20     Estimated Creatinine Clearance: 63.9 mL/min (A) (by C-G formula based on SCr of 0.47 mg/dL (L)).  No results found for: BNP    Microbiology Results Abnormal     None        Imaging Results (last 24 hours)     ** No results found for the last 24 hours. **           I have reviewed the medications.      enoxaparin 40 mg Subcutaneous Q24H   losartan 50 mg Oral Q24H   magnesium citrate 150 mL Oral Once   metroNIDAZOLE 500 mg Oral TID   neomycin 1,000 mg Oral TID   PEG-KCl-NaCl-NaSulf-Na Asc-C 1,000 mL Oral Once   sennosides-docusate sodium 2 tablet Oral BID   sodium chloride 3 mL Intravenous Q12H         Assessment/Plan   Assessment / Plan     Hospital Problem List     * (Principal)Acute bilateral lower abdominal pain    Pelvic mass    HTN (hypertension)          Brief Hospital Course to date:  Issa Farmer is a 72 y.o. female w/ htn presented with abdominal pain. Ct imaging revealed pelvic mass. Dr. margy chapman w/ surgery consulted and originally ordered ct guided biopsy, which was subsequently cancelled as radiologist felt lesion might be originating from ovary.      Assessment:    *abdominal pain  *pelvic mass   -elevated ca 125   -normal cea level  *constipation  *htn  -----------------------------------  Plan:    -constipation resolved    -gyn onc following, plans surgery Monday    -start clear liquids and continue over the weekend   -bowel prep started today. NPO after MN   -gyn onc titrating opioids- pain improving   - preop cxr/ ekg complete    -hold sq lovenox day of surgery    - tessalon perles and cough drops prn  - encourage IS/ ambulation      DVT Prophylaxis: lovenox sq    CODE STATUS:   Code Status and Medical Interventions:   Ordered at: 10/02/18 4624     Code Status:    CPR     Medical Interventions (Level of Support Prior to Arrest):    Full       Disposition: I expect the patient to be discharged TBD      Electronically signed  by Josi Hays, TRICIA, 10/07/18, 10:25 AM.

## 2018-10-07 NOTE — PLAN OF CARE
Problem: Patient Care Overview  Goal: Plan of Care Review  Outcome: Ongoing (interventions implemented as appropriate)   10/07/18 0626   Coping/Psychosocial   Plan of Care Reviewed With patient   OTHER   Outcome Summary VSS, no c/o pain. clear liquid diet continued, bowel prep today. Pt slept well throughout the night. will continue to monitor.    Plan of Care Review   Progress no change     Goal: Individualization and Mutuality  Outcome: Ongoing (interventions implemented as appropriate)    Goal: Discharge Needs Assessment  Outcome: Ongoing (interventions implemented as appropriate)    Goal: Interprofessional Rounds/Family Conf  Outcome: Ongoing (interventions implemented as appropriate)      Problem: Pain, Acute (Adult)  Goal: Identify Related Risk Factors and Signs and Symptoms  Outcome: Ongoing (interventions implemented as appropriate)    Goal: Acceptable Pain Control/Comfort Level  Outcome: Ongoing (interventions implemented as appropriate)      Problem: Hysterectomy (Adult)  Goal: Signs and Symptoms of Listed Potential Problems Will be Absent, Minimized or Managed (Hysterectomy)  Outcome: Ongoing (interventions implemented as appropriate)    Goal: Anesthesia/Sedation Recovery  Outcome: Ongoing (interventions implemented as appropriate)

## 2018-10-07 NOTE — PLAN OF CARE
"Problem: Patient Care Overview  Goal: Plan of Care Review  Outcome: Ongoing (interventions implemented as appropriate)   10/07/18 1600   Coping/Psychosocial   Plan of Care Reviewed With patient   OTHER   Outcome Summary compliant with plan of care \"I'm in good hands\" tylenol for pain prn         "

## 2018-10-08 ENCOUNTER — ANESTHESIA (OUTPATIENT)
Dept: PERIOP | Facility: HOSPITAL | Age: 73
End: 2018-10-08

## 2018-10-08 PROBLEM — R10.32 ACUTE BILATERAL LOWER ABDOMINAL PAIN: Status: RESOLVED | Noted: 2018-10-02 | Resolved: 2018-10-08

## 2018-10-08 PROBLEM — R19.00 PELVIC MASS: Status: RESOLVED | Noted: 2018-10-02 | Resolved: 2018-10-08

## 2018-10-08 PROBLEM — R10.31 ACUTE BILATERAL LOWER ABDOMINAL PAIN: Status: RESOLVED | Noted: 2018-10-02 | Resolved: 2018-10-08

## 2018-10-08 LAB — POTASSIUM BLDA-SCNC: 3.27 MMOL/L (ref 3.5–5.3)

## 2018-10-08 PROCEDURE — 58571 TLH W/T/O 250 G OR LESS: CPT | Performed by: OBSTETRICS & GYNECOLOGY

## 2018-10-08 PROCEDURE — 25010000002 DEXAMETHASONE SODIUM PHOSPHATE 10 MG/ML SOLUTION: Performed by: NURSE ANESTHETIST, CERTIFIED REGISTERED

## 2018-10-08 PROCEDURE — 0UT70ZZ RESECTION OF BILATERAL FALLOPIAN TUBES, OPEN APPROACH: ICD-10-PCS | Performed by: OBSTETRICS & GYNECOLOGY

## 2018-10-08 PROCEDURE — 0UT90ZZ RESECTION OF UTERUS, OPEN APPROACH: ICD-10-PCS | Performed by: OBSTETRICS & GYNECOLOGY

## 2018-10-08 PROCEDURE — 88331 PATH CONSLTJ SURG 1 BLK 1SPC: CPT | Performed by: PATHOLOGY

## 2018-10-08 PROCEDURE — 99232 SBSQ HOSP IP/OBS MODERATE 35: CPT | Performed by: NURSE PRACTITIONER

## 2018-10-08 PROCEDURE — 25010000002 NEOSTIGMINE PER 0.5 MG: Performed by: NURSE ANESTHETIST, CERTIFIED REGISTERED

## 2018-10-08 PROCEDURE — 25010000002 BUPRENORPHINE PER 0.1 MG: Performed by: NURSE ANESTHETIST, CERTIFIED REGISTERED

## 2018-10-08 PROCEDURE — 25010000002 DEXAMETHASONE SODIUM PHOSPHATE 10 MG/ML SOLUTION 1 ML VIAL: Performed by: NURSE ANESTHETIST, CERTIFIED REGISTERED

## 2018-10-08 PROCEDURE — 25010000002 CEFOXITIN PER 1 G: Performed by: NURSE ANESTHETIST, CERTIFIED REGISTERED

## 2018-10-08 PROCEDURE — 25010000002 FENTANYL CITRATE (PF) 100 MCG/2ML SOLUTION: Performed by: NURSE ANESTHETIST, CERTIFIED REGISTERED

## 2018-10-08 PROCEDURE — 25010000002 ONDANSETRON PER 1 MG: Performed by: NURSE ANESTHETIST, CERTIFIED REGISTERED

## 2018-10-08 PROCEDURE — 88307 TISSUE EXAM BY PATHOLOGIST: CPT | Performed by: OBSTETRICS & GYNECOLOGY

## 2018-10-08 PROCEDURE — 25010000002 PROPOFOL 10 MG/ML EMULSION: Performed by: NURSE ANESTHETIST, CERTIFIED REGISTERED

## 2018-10-08 PROCEDURE — 84132 ASSAY OF SERUM POTASSIUM: CPT | Performed by: ANESTHESIOLOGY

## 2018-10-08 PROCEDURE — 0UT20ZZ RESECTION OF BILATERAL OVARIES, OPEN APPROACH: ICD-10-PCS | Performed by: OBSTETRICS & GYNECOLOGY

## 2018-10-08 PROCEDURE — 25010000002 PROPOFOL 1000 MG/ML EMULSION: Performed by: NURSE ANESTHETIST, CERTIFIED REGISTERED

## 2018-10-08 RX ORDER — ATRACURIUM BESYLATE 10 MG/ML
INJECTION, SOLUTION INTRAVENOUS AS NEEDED
Status: DISCONTINUED | OUTPATIENT
Start: 2018-10-08 | End: 2018-10-08 | Stop reason: SURG

## 2018-10-08 RX ORDER — SODIUM CHLORIDE, SODIUM LACTATE, POTASSIUM CHLORIDE, CALCIUM CHLORIDE 600; 310; 30; 20 MG/100ML; MG/100ML; MG/100ML; MG/100ML
9 INJECTION, SOLUTION INTRAVENOUS CONTINUOUS
Status: DISCONTINUED | OUTPATIENT
Start: 2018-10-08 | End: 2018-10-08

## 2018-10-08 RX ORDER — DIPHENHYDRAMINE HCL 25 MG
25 CAPSULE ORAL NIGHTLY PRN
Status: DISCONTINUED | OUTPATIENT
Start: 2018-10-08 | End: 2018-10-11 | Stop reason: HOSPADM

## 2018-10-08 RX ORDER — LIDOCAINE HYDROCHLORIDE 10 MG/ML
0.5 INJECTION, SOLUTION EPIDURAL; INFILTRATION; INTRACAUDAL; PERINEURAL ONCE AS NEEDED
Status: DISCONTINUED | OUTPATIENT
Start: 2018-10-08 | End: 2018-10-08 | Stop reason: HOSPADM

## 2018-10-08 RX ORDER — LIDOCAINE HYDROCHLORIDE 10 MG/ML
INJECTION, SOLUTION INFILTRATION; PERINEURAL AS NEEDED
Status: DISCONTINUED | OUTPATIENT
Start: 2018-10-08 | End: 2018-10-08 | Stop reason: SURG

## 2018-10-08 RX ORDER — HYDROMORPHONE HYDROCHLORIDE 1 MG/ML
0.5 INJECTION, SOLUTION INTRAMUSCULAR; INTRAVENOUS; SUBCUTANEOUS
Status: DISCONTINUED | OUTPATIENT
Start: 2018-10-08 | End: 2018-10-08 | Stop reason: HOSPADM

## 2018-10-08 RX ORDER — SODIUM CHLORIDE 0.9 % (FLUSH) 0.9 %
3 SYRINGE (ML) INJECTION EVERY 12 HOURS SCHEDULED
Status: DISCONTINUED | OUTPATIENT
Start: 2018-10-08 | End: 2018-10-08 | Stop reason: HOSPADM

## 2018-10-08 RX ORDER — SIMETHICONE 80 MG
80 TABLET,CHEWABLE ORAL 4 TIMES DAILY PRN
Status: DISCONTINUED | OUTPATIENT
Start: 2018-10-08 | End: 2018-10-11 | Stop reason: HOSPADM

## 2018-10-08 RX ORDER — FENTANYL CITRATE 50 UG/ML
50 INJECTION, SOLUTION INTRAMUSCULAR; INTRAVENOUS
Status: DISCONTINUED | OUTPATIENT
Start: 2018-10-08 | End: 2018-10-08 | Stop reason: HOSPADM

## 2018-10-08 RX ORDER — DOCUSATE SODIUM 100 MG/1
200 CAPSULE, LIQUID FILLED ORAL 2 TIMES DAILY
Status: DISCONTINUED | OUTPATIENT
Start: 2018-10-08 | End: 2018-10-11 | Stop reason: HOSPADM

## 2018-10-08 RX ORDER — PROMETHAZINE HYDROCHLORIDE 25 MG/ML
6.25 INJECTION, SOLUTION INTRAMUSCULAR; INTRAVENOUS ONCE AS NEEDED
Status: DISCONTINUED | OUTPATIENT
Start: 2018-10-08 | End: 2018-10-08 | Stop reason: HOSPADM

## 2018-10-08 RX ORDER — SODIUM CHLORIDE 9 MG/ML
INJECTION, SOLUTION INTRAVENOUS CONTINUOUS PRN
Status: DISCONTINUED | OUTPATIENT
Start: 2018-10-08 | End: 2018-10-08 | Stop reason: SURG

## 2018-10-08 RX ORDER — FAMOTIDINE 20 MG/1
20 TABLET, FILM COATED ORAL ONCE
Status: COMPLETED | OUTPATIENT
Start: 2018-10-08 | End: 2018-10-08

## 2018-10-08 RX ORDER — FENTANYL CITRATE 50 UG/ML
INJECTION, SOLUTION INTRAMUSCULAR; INTRAVENOUS AS NEEDED
Status: DISCONTINUED | OUTPATIENT
Start: 2018-10-08 | End: 2018-10-08 | Stop reason: SURG

## 2018-10-08 RX ORDER — ONDANSETRON 2 MG/ML
4 INJECTION INTRAMUSCULAR; INTRAVENOUS ONCE AS NEEDED
Status: DISCONTINUED | OUTPATIENT
Start: 2018-10-08 | End: 2018-10-08 | Stop reason: HOSPADM

## 2018-10-08 RX ORDER — ACETAMINOPHEN 325 MG/1
650 TABLET ORAL EVERY 6 HOURS SCHEDULED
Status: DISCONTINUED | OUTPATIENT
Start: 2018-10-08 | End: 2018-10-10

## 2018-10-08 RX ORDER — GLYCOPYRROLATE 0.2 MG/ML
INJECTION INTRAMUSCULAR; INTRAVENOUS AS NEEDED
Status: DISCONTINUED | OUTPATIENT
Start: 2018-10-08 | End: 2018-10-08 | Stop reason: SURG

## 2018-10-08 RX ORDER — CEFOXITIN 2 G/1
2 INJECTION, POWDER, FOR SOLUTION INTRAVENOUS ONCE
Status: DISCONTINUED | OUTPATIENT
Start: 2018-10-08 | End: 2018-10-08

## 2018-10-08 RX ORDER — HYDROCODONE BITARTRATE AND ACETAMINOPHEN 7.5; 325 MG/1; MG/1
1 TABLET ORAL ONCE AS NEEDED
Status: DISCONTINUED | OUTPATIENT
Start: 2018-10-08 | End: 2018-10-08 | Stop reason: HOSPADM

## 2018-10-08 RX ORDER — PROMETHAZINE HYDROCHLORIDE 25 MG/1
25 TABLET ORAL ONCE AS NEEDED
Status: DISCONTINUED | OUTPATIENT
Start: 2018-10-08 | End: 2018-10-08 | Stop reason: HOSPADM

## 2018-10-08 RX ORDER — ONDANSETRON 2 MG/ML
INJECTION INTRAMUSCULAR; INTRAVENOUS AS NEEDED
Status: DISCONTINUED | OUTPATIENT
Start: 2018-10-08 | End: 2018-10-08 | Stop reason: SURG

## 2018-10-08 RX ORDER — SODIUM CHLORIDE 0.9 % (FLUSH) 0.9 %
3-10 SYRINGE (ML) INJECTION AS NEEDED
Status: DISCONTINUED | OUTPATIENT
Start: 2018-10-08 | End: 2018-10-08 | Stop reason: HOSPADM

## 2018-10-08 RX ORDER — PROPOFOL 10 MG/ML
VIAL (ML) INTRAVENOUS AS NEEDED
Status: DISCONTINUED | OUTPATIENT
Start: 2018-10-08 | End: 2018-10-08 | Stop reason: SURG

## 2018-10-08 RX ORDER — SODIUM CHLORIDE 9 MG/ML
75 INJECTION, SOLUTION INTRAVENOUS CONTINUOUS
Status: DISCONTINUED | OUTPATIENT
Start: 2018-10-08 | End: 2018-10-09

## 2018-10-08 RX ORDER — OXYCODONE HYDROCHLORIDE 5 MG/1
10 TABLET ORAL EVERY 4 HOURS PRN
Status: DISCONTINUED | OUTPATIENT
Start: 2018-10-08 | End: 2018-10-11 | Stop reason: HOSPADM

## 2018-10-08 RX ORDER — PROMETHAZINE HYDROCHLORIDE 25 MG/1
25 SUPPOSITORY RECTAL ONCE AS NEEDED
Status: DISCONTINUED | OUTPATIENT
Start: 2018-10-08 | End: 2018-10-08 | Stop reason: HOSPADM

## 2018-10-08 RX ORDER — DIPHENHYDRAMINE HYDROCHLORIDE 50 MG/ML
25 INJECTION INTRAMUSCULAR; INTRAVENOUS NIGHTLY PRN
Status: DISCONTINUED | OUTPATIENT
Start: 2018-10-08 | End: 2018-10-11 | Stop reason: HOSPADM

## 2018-10-08 RX ORDER — FAMOTIDINE 10 MG/ML
20 INJECTION, SOLUTION INTRAVENOUS ONCE
Status: DISCONTINUED | OUTPATIENT
Start: 2018-10-08 | End: 2018-10-08 | Stop reason: HOSPADM

## 2018-10-08 RX ORDER — MAGNESIUM HYDROXIDE 1200 MG/15ML
LIQUID ORAL AS NEEDED
Status: DISCONTINUED | OUTPATIENT
Start: 2018-10-08 | End: 2018-10-08 | Stop reason: HOSPADM

## 2018-10-08 RX ORDER — DEXAMETHASONE SODIUM PHOSPHATE 10 MG/ML
INJECTION, SOLUTION INTRAMUSCULAR; INTRAVENOUS AS NEEDED
Status: DISCONTINUED | OUTPATIENT
Start: 2018-10-08 | End: 2018-10-08 | Stop reason: SURG

## 2018-10-08 RX ORDER — CEFOXITIN 2 G/1
INJECTION, POWDER, FOR SOLUTION INTRAVENOUS AS NEEDED
Status: DISCONTINUED | OUTPATIENT
Start: 2018-10-08 | End: 2018-10-08 | Stop reason: SURG

## 2018-10-08 RX ORDER — HYDROMORPHONE HYDROCHLORIDE 1 MG/ML
0.5 INJECTION, SOLUTION INTRAMUSCULAR; INTRAVENOUS; SUBCUTANEOUS
Status: DISCONTINUED | OUTPATIENT
Start: 2018-10-08 | End: 2018-10-11 | Stop reason: HOSPADM

## 2018-10-08 RX ORDER — NALOXONE HCL 0.4 MG/ML
0.1 VIAL (ML) INJECTION
Status: DISCONTINUED | OUTPATIENT
Start: 2018-10-08 | End: 2018-10-11 | Stop reason: HOSPADM

## 2018-10-08 RX ADMIN — ONDANSETRON 4 MG: 2 INJECTION INTRAMUSCULAR; INTRAVENOUS at 08:44

## 2018-10-08 RX ADMIN — OXYCODONE HYDROCHLORIDE 10 MG: 5 TABLET ORAL at 21:20

## 2018-10-08 RX ADMIN — ATRACURIUM BESYLATE 10 MG: 10 INJECTION, SOLUTION INTRAVENOUS at 08:30

## 2018-10-08 RX ADMIN — SODIUM CHLORIDE: 9 INJECTION, SOLUTION INTRAVENOUS at 07:32

## 2018-10-08 RX ADMIN — LOSARTAN POTASSIUM 50 MG: 50 TABLET ORAL at 11:35

## 2018-10-08 RX ADMIN — Medication 3 MG: at 09:09

## 2018-10-08 RX ADMIN — DEXAMETHASONE SODIUM PHOSPHATE 6 MG: 10 INJECTION, SOLUTION INTRAMUSCULAR; INTRAVENOUS at 07:53

## 2018-10-08 RX ADMIN — DEXAMETHASONE SODIUM PHOSPHATE 60 ML: 10 INJECTION, SOLUTION INTRAMUSCULAR; INTRAVENOUS at 07:48

## 2018-10-08 RX ADMIN — DOCUSATE SODIUM 200 MG: 100 CAPSULE, LIQUID FILLED ORAL at 20:00

## 2018-10-08 RX ADMIN — ATRACURIUM BESYLATE 40 MG: 10 INJECTION, SOLUTION INTRAVENOUS at 07:41

## 2018-10-08 RX ADMIN — DOCUSATE SODIUM 200 MG: 100 CAPSULE, LIQUID FILLED ORAL at 11:35

## 2018-10-08 RX ADMIN — FENTANYL CITRATE 50 MCG: 50 INJECTION INTRAMUSCULAR; INTRAVENOUS at 10:07

## 2018-10-08 RX ADMIN — SODIUM CHLORIDE 75 ML/HR: 9 INJECTION, SOLUTION INTRAVENOUS at 21:20

## 2018-10-08 RX ADMIN — GLYCOPYRROLATE 0.4 MG: 0.2 INJECTION, SOLUTION INTRAMUSCULAR; INTRAVENOUS at 09:09

## 2018-10-08 RX ADMIN — IBUPROFEN 600 MG: 600 TABLET ORAL at 15:15

## 2018-10-08 RX ADMIN — ACETAMINOPHEN 650 MG: 325 TABLET ORAL at 23:14

## 2018-10-08 RX ADMIN — PROPOFOL 150 MG: 10 INJECTION, EMULSION INTRAVENOUS at 07:41

## 2018-10-08 RX ADMIN — ACETAMINOPHEN 650 MG: 325 TABLET ORAL at 11:37

## 2018-10-08 RX ADMIN — LIDOCAINE HYDROCHLORIDE 50 MG: 10 INJECTION, SOLUTION INFILTRATION; PERINEURAL at 07:41

## 2018-10-08 RX ADMIN — PROPOFOL 25 MCG/KG/MIN: 10 INJECTION, EMULSION INTRAVENOUS at 07:51

## 2018-10-08 RX ADMIN — SODIUM CHLORIDE 75 ML/HR: 9 INJECTION, SOLUTION INTRAVENOUS at 11:35

## 2018-10-08 RX ADMIN — FENTANYL CITRATE 50 MCG: 50 INJECTION INTRAMUSCULAR; INTRAVENOUS at 10:20

## 2018-10-08 RX ADMIN — FAMOTIDINE 20 MG: 20 TABLET ORAL at 06:49

## 2018-10-08 RX ADMIN — SIMETHICONE CHEW TAB 80 MG 80 MG: 80 TABLET ORAL at 15:15

## 2018-10-08 RX ADMIN — FENTANYL CITRATE 50 MCG: 50 INJECTION, SOLUTION INTRAMUSCULAR; INTRAVENOUS at 07:41

## 2018-10-08 RX ADMIN — ACETAMINOPHEN 650 MG: 325 TABLET ORAL at 17:23

## 2018-10-08 RX ADMIN — CEFOXITIN 2 G: 2 INJECTION, POWDER, FOR SOLUTION INTRAVENOUS at 07:44

## 2018-10-08 NOTE — PLAN OF CARE
Problem: Patient Care Overview  Goal: Plan of Care Review  Outcome: Ongoing (interventions implemented as appropriate)   10/08/18 2320   Coping/Psychosocial   Plan of Care Reviewed With patient   OTHER   Outcome Summary Surgery today, pain well controlled, tolerating PO diet, Simethicone given for gas pain.    Plan of Care Review   Progress improving     Goal: Individualization and Mutuality  Outcome: Ongoing (interventions implemented as appropriate)    Goal: Discharge Needs Assessment  Outcome: Ongoing (interventions implemented as appropriate)    Goal: Interprofessional Rounds/Family Conf  Outcome: Ongoing (interventions implemented as appropriate)      Problem: Pain, Acute (Adult)  Goal: Acceptable Pain Control/Comfort Level  Outcome: Ongoing (interventions implemented as appropriate)      Problem: Hysterectomy (Adult)  Goal: Signs and Symptoms of Listed Potential Problems Will be Absent, Minimized or Managed (Hysterectomy)  Outcome: Ongoing (interventions implemented as appropriate)    Goal: Anesthesia/Sedation Recovery  Outcome: Ongoing (interventions implemented as appropriate)

## 2018-10-08 NOTE — ANESTHESIA PROCEDURE NOTES
Peripheral Block      Patient location during procedure: OR  Reason for block: at surgeon's request and post-op pain management  Performed by  CRNA: LENO GUTIÉRREZ  Assisted by: MIKEL POLLOCK  Preanesthetic Checklist  Completed: patient identified, site marked, surgical consent, pre-op evaluation, timeout performed, IV checked, risks and benefits discussed and monitors and equipment checked  Prep:  Pt Position: supine  Sterile barriers:cap, gloves, sterile barriers and mask  Prep: ChloraPrep  Patient monitoring: blood pressure monitoring, continuous pulse oximetry and EKG  Procedure  Sedation:yes  Performed under: general  Guidance:ultrasound guided  Images:still images obtained    Laterality:Bilateral  Block Type:TAP  Injection Technique:single-shot  Needle Type:short-bevel and echogenic  Needle Gauge:20 G    Medications  Comment:Block Injection:  LA dose divided between Right and Left block       Adjuncts:  Decadron 4mg PSF, Buprenex 0.3mg (Per total volume of LA)  Local Injected:bupivacaine 0.25% Local Amount Injected:60mL  Post Assessment  Injection Assessment: negative aspiration for heme, incremental injection and no paresthesia on injection  Patient Tolerance:comfortable throughout block  Complications:no  Additional Notes      Under Ultrasound guidance, a BBraun 4inch 360 degree needle was advanced with Normal Saline hydro dissection of tissue.  The Internal Oblique and Transversus Abdominus muscles where visualized.  At or before the aponeurosis of Internal Oblique, local anesthetic spread was visualized in the Transversus Abdominus Plane. Injection was made incrementally with aspiration every 5 mls.  There was no  intravascular injection,  injection pressure was normal, there was no neural injection, and the procedure was completed without difficulty.  Thank You.

## 2018-10-08 NOTE — ANESTHESIA PROCEDURE NOTES
Airway  Urgency: elective    Airway not difficult    General Information and Staff    Patient location during procedure: OR  CRNA: THAO FOX    Indications and Patient Condition  Indications for airway management: airway protection    Preoxygenated: yes  MILS not maintained throughout  Mask difficulty assessment: 1 - vent by mask    Final Airway Details  Final airway type: endotracheal airway      Successful airway: ETT  Cuffed: yes   Successful intubation technique: direct laryngoscopy  Facilitating devices/methods: intubating stylet  Endotracheal tube insertion site: oral  Blade: Kenia  Blade size: 3  ETT size: 7.0 mm  Cormack-Lehane Classification: grade I - full view of glottis  Placement verified by: chest auscultation and capnometry   Measured from: lips  ETT to lips (cm): 20  Number of attempts at approach: 1    Additional Comments  Negative epigastric sounds, Breath sound equal bilaterally with symmetric chest rise and fall

## 2018-10-08 NOTE — PLAN OF CARE
Problem: Patient Care Overview  Goal: Plan of Care Review  Outcome: Ongoing (interventions implemented as appropriate)   10/08/18 0356   Coping/Psychosocial   Plan of Care Reviewed With patient   OTHER   Outcome Summary VSS, Pt NPO for surgery today. tylenol given for pain control. Pt slept well throughout the night. Bowel prep completed.    Plan of Care Review   Progress no change     Goal: Individualization and Mutuality  Outcome: Ongoing (interventions implemented as appropriate)    Goal: Discharge Needs Assessment  Outcome: Ongoing (interventions implemented as appropriate)    Goal: Interprofessional Rounds/Family Conf  Outcome: Ongoing (interventions implemented as appropriate)      Problem: Pain, Acute (Adult)  Goal: Identify Related Risk Factors and Signs and Symptoms  Outcome: Outcome(s) achieved Date Met: 10/08/18    Goal: Acceptable Pain Control/Comfort Level  Outcome: Ongoing (interventions implemented as appropriate)      Problem: Hysterectomy (Adult)  Goal: Signs and Symptoms of Listed Potential Problems Will be Absent, Minimized or Managed (Hysterectomy)  Outcome: Ongoing (interventions implemented as appropriate)    Goal: Anesthesia/Sedation Recovery  Outcome: Ongoing (interventions implemented as appropriate)

## 2018-10-08 NOTE — ANESTHESIA PREPROCEDURE EVALUATION
Anesthesia Evaluation     Patient summary reviewed and Nursing notes reviewed                Airway   Mallampati: II  Dental      Pulmonary - negative pulmonary ROS   Cardiovascular     (+) hypertension,       Neuro/Psych- negative ROS  GI/Hepatic/Renal/Endo - negative ROS     Musculoskeletal (-) negative ROS    Abdominal    Substance History - negative use     OB/GYN negative ob/gyn ROS         Other                        Anesthesia Plan    ASA 3     general and regional     intravenous induction   Anesthetic plan, all risks, benefits, and alternatives have been provided, discussed and informed consent has been obtained with: patient.

## 2018-10-08 NOTE — ANESTHESIA POSTPROCEDURE EVALUATION
Patient: Issa Farmer    Procedure Summary     Date:  10/08/18 Room / Location:   [facility] /  [facility]    Anesthesia Start:  0732 Anesthesia Stop:  0928    Procedure:  EXPLORATORY LAPAROTOMY, TOTAL ABDOMINAL HYSTERECTOMY, BILATERAL SALPINGO-OOPHORECTOMY (N/A Abdomen) Diagnosis:       Pelvic mass      Acute bilateral lower abdominal pain      (Pelvic mass [R19.00])      (Acute bilateral lower abdominal pain [R10.31, R10.32])    Surgeon:  Deysi Watts MD Provider:  Saul Whitney MD    Anesthesia Type:  general, regional ASA Status:  3          Anesthesia Type: general, regional  Last vitals  BP   162/70 (10/08/18 0928)   Temp   98.8 °F (37.1 °C) (10/08/18 0928)   Pulse   80 (10/08/18 0928)   Resp   18 (10/08/18 0928)     SpO2   93 % (10/08/18 0928)     Post Anesthesia Care and Evaluation    Patient location during evaluation: PACU  Patient participation: waiting for patient participation  Level of consciousness: sleepy but conscious  Pain score: 0  Pain management: adequate  Airway patency: patent  Anesthetic complications: No anesthetic complications  PONV Status: none  Cardiovascular status: hemodynamically stable and acceptable  Respiratory status: nonlabored ventilation, acceptable, nasal cannula and oral airway  Hydration status: acceptable

## 2018-10-08 NOTE — PROGRESS NOTES
[facility] Medicine Services  PROGRESS NOTE    Patient Name: Issa Farmer  : 1945  MRN: [id number]    Date of Admission: 10/2/2018  Length of Stay: 6  Primary Care Physician: Yan Unger DO    Subjective   Subjective     CC:  abd pain, pelvic mass    HPI:  Patient just returned to room from PACU. Very tired and abdominal discomfort that she relates to gas/ fullness. No pain/ nausea or vomiting. Breathing ok    Review of Systems  Gen- No fevers, chills  CV- No chest pain, palpitations  Resp- No cough, dyspnea  GI- No N/V/D, abd pain    Otherwise ROS is negative except as mentioned in the HPI.    Objective   Objective     Vital Signs:   Temp:  [98 °F (36.7 °C)-98.8 °F (37.1 °C)] 98.3 °F (36.8 °C)  Heart Rate:  [73-90] 87  Resp:  [14-18] 16  BP: (144-172)/(65-86) 172/79        Physical Exam:  Constitutional:Alert, oriented x 3, nontoxic appearing  Psych:Normal/appropriate affect  HEENT:Ncat, oroph clear  Neck: neck supple, full range of motion  Neuro: Face symmetric, speech clear, equal , moves all extremities  Cardiac: Rrr; No edema  Resp: Ctab, normal effort  GI: abd binder in place with dime size amount of blood to right lower side. No BS currently  Skin: No extremity rash  Musculoskeletal/extremities: no cyanosis extremities; no significant ankle edema    Results Reviewed:  I have personally reviewed current lab, radiology, and data and agree.      Results from last 7 days  Lab Units 10/03/18  0324 10/02/18  0942   WBC 10*3/mm3  --  8.89   HEMOGLOBIN g/dL  --  14.0   HEMATOCRIT %  --  43.3   PLATELETS 10*3/mm3  --  215   INR  1.04  --        Results from last 7 days  Lab Units 10/05/18  0552 10/04/18  0555 10/03/18  0324 10/02/18  0942   SODIUM mmol/L 137 138 139 136   POTASSIUM mmol/L 3.5 3.9 4.3 3.7   CHLORIDE mmol/L 100 101 105 98*   CO2 mmol/L 30.0 30.0 28.0 30.0   BUN mg/dL 7* 6* 11 16   CREATININE mg/dL 0.47* 0.41* 0.45* 0.53*   GLUCOSE mg/dL 103* 93 89  126*   CALCIUM mg/dL 9.0 8.4* 8.2* 9.5   ALT (SGPT) U/L  --   --  12 21   AST (SGOT) U/L  --   --  17 20     Estimated Creatinine Clearance: 63.9 mL/min (A) (by C-G formula based on SCr of 0.47 mg/dL (L)).  No results found for: BNP    Microbiology Results Abnormal     None        Imaging Results (last 24 hours)     ** No results found for the last 24 hours. **           I have reviewed the medications.      acetaminophen 650 mg Oral Q6H   docusate sodium 200 mg Oral BID   [START ON 10/9/2018] enoxaparin 40 mg Subcutaneous Daily   losartan 50 mg Oral Q24H         Assessment/Plan   Assessment / Plan     Hospital Problem List     * (Principal)Acute bilateral lower abdominal pain    Pelvic mass    HTN (hypertension)          Brief Hospital Course to date:  Issa Farmer is a 72 y.o. female w/ htn presented with abdominal pain. Ct imaging revealed pelvic mass. Dr. margy chapman w/ surgery consulted and originally ordered ct guided biopsy, which was subsequently cancelled as radiologist felt lesion might be originating from ovary.      Assessment:    *abdominal pain  *pelvic mass   -elevated ca 125   -normal cea level  *constipation  *htn  -----------------------------------  Plan:    -constipation resolved    -gyn onc following s/p Ex Lap with JUANA and tumor debulking   - large ~20cm left sided adnexal mass with smaller 5cm vascular mass on IP ligament     -hold sq lovenox day of surgery    - tessalon perles and cough drops prn  - encourage IS/ ambulation   - butts in place per surgery  - add simethicone for gas symptoms prn  -am labs      DVT Prophylaxis: lovenox sq    CODE STATUS:   Code Status and Medical Interventions:   Ordered at: 10/08/18 1108     Code Status:    CPR     Medical Interventions (Level of Support Prior to Arrest):    Full       Disposition: I expect the patient to be discharged TBD      Electronically signed by TRICIA Gomez, 10/08/18, 12:47 PM.

## 2018-10-08 NOTE — PROGRESS NOTES
Gynecologic Oncology   Daily Progress Note    Subjective   S/p bowel prep - all questions answered about OR plan.  LT shoulder discomfort.  abd pain little better.      Objective   Temp:  [98 °F (36.7 °C)-99.1 °F (37.3 °C)] 98.8 °F (37.1 °C)  Heart Rate:  [73-81] 81  Resp:  [14-18] 14  BP: (140-153)/(65-86) 153/86  Vitals:    10/08/18 0637   BP: 153/86   Pulse: 81   Resp: 14   Temp: 98.8 °F (37.1 °C)   SpO2: 95%     I/O last 3 completed shifts:  In: 960 [P.O.:960]  Out: 800 [Urine:800]     GENERAL: Alert, well-appearing female in no apparent distress.  She appears her stated age.   HEENT: Sclera anicteric, normal eyelids. Head normocephalic, atraumatic. Mucus membranes moist.  Normal dentition.    NECK: Trachea midline, supple, without masses.  No thyromegaly.      CARDIOVASCULAR: Normal rate, regular rhythm, no murmurs, rubs, or gallops.  Extremities symmetric.  No peripheral edema.  RESPIRATORY: Clear to auscultation bilaterally, normal respiratory effort  GASTROINTESTINAL:  Soft, tender to palpation in bilateral lower quadrants, non-distended, no rebound or guarding. Large RT sided abdominal mass appreciated extending above umbilicus.   MUSCULOSKELETAL:    grossly intact   SKIN:  Warm, dry, well-perfused.  All visible areas intact.  No rashes, lesions, ulcers.  PSYCHIATRIC: AO x3, with appropriate affect, normal thought processes  LYMPHATICS:  No cervical or inguinal adenopathy noted.    Lab Results   Component Value Date    WBC 8.89 10/02/2018    HGB 14.0 10/02/2018    HCT 43.3 10/02/2018    MCV 84.6 10/02/2018     10/02/2018    NEUTROABS 7.46 10/02/2018    GLUCOSE 103 (H) 10/05/2018    BUN 7 (L) 10/05/2018    CREATININE 0.47 (L) 10/05/2018     10/05/2018    K 3.5 10/05/2018     10/05/2018    CO2 30.0 10/05/2018    CALCIUM 9.0 10/05/2018         Assessment/Plan   Issa Famrer is a 72 y.o. femalewith Abdominal pain and pelvic masses     # Pelvic mass  -OR today - plan reviewed.   -abx  PRIMITIVO  -SCDs     # HTN  -Continue home losartan  -Primary team managing     #FEN/PPX  -SQH or ppx lovenox until 10/8/18  -s/p bowel prep     # Dispo:  -discharge p/ recovery from surgery     # Personal /family h/o cancer  -Consider genetics depending on final path         Deysi Watts MD  10/08/18  7:29 AM

## 2018-10-08 NOTE — OP NOTE
EXPLORATORY LAPAROTOMY, TOTAL ABDOMINAL HYSTERECTOMY WITH TUMOR DEBULKING  Procedure Note    Issa Farmer  10/2/2018 - 10/8/2018    Pre-op Diagnosis:   Pelvic mass [R19.00]  Acute bilateral lower abdominal pain [R10.31, R10.32]    Post-op Diagnosis:     Post-Op Diagnosis Codes:     * Pelvic mass [R19.00]     * Acute bilateral lower abdominal pain [R10.31, R10.32]    Procedure(s):  EXPLORATORY LAPAROTOMY, TOTAL ABDOMINAL HYSTERECTOMY, BILATERAL SALPINGO-OOPHORECTOMY    Surgeon(s):  Deysi Watts MD Martin, Jacob, MD    Anesthesia: General with Block    Staff:   Circulator: Kenneth Causey RN  Scrub Person: Alexx Batres  Nursing Assistant: Kylah Shah; Christine Mccullough    Estimated Blood Loss: 50 mL    Specimens:                  Order Name Source Comment Collection Info Order Time   OR POTASSIUM   Collected By: Carlos Camargo RN 10/8/2018  6:37 AM   TISSUE PATHOLOGY EXAM Ovary, Left  Collected By: Deysi Watts MD 10/8/2018  8:10 AM         Drains:   Urethral Catheter 16 Fr. (Active)       [REMOVED] NG/OG Tube Orogastric 18 Fr (Removed)       Findings:  1. Large approximately ~20 cm left sided adnexal mass with smaller 5 cm vascular mass torsed on IP ligament.   2. Normal appearing right sided fallopian tube and ovary  3. Normal appearing uterus    4. Normal appearing appendix     Complications: None     Procedures:      After consent was obtained, the patient was taken to  the operating room and underwent general endotracheal anesthesia. TAP block was placed. The patient  was prepped and draped in a normal sterile fashion in the dorsal lithotomy  position in Yellowfin stirrups. Positioning was found to be adequate. A time out was performed for patient safety. The abdomen, perineum and vagina were prepped and draped in the usual sterile fashion. Ibanez catheter was anchored.     A midline laparotomy incision was made from the pubic symphysis to several centimeters below the umbilicus.  Fascia was  scored in the midline and fascial incision was carried down sharply to the underlying peritoneum.  Peritoneum was entered sharply.  Pelvic washings were collected.  The abdomen was explored with the findings noted above. Left sided adnexal mass was clamped, transected and suture ligated. Specimen handed off the surgical field for frozen pathology.    A Bookwalter retractor was utilized and the bowel packed away with moist laparotomy sponges. The uterus was elevated  with antoine clamp X 2 placed at the bilateral cornua. The round ligaments were ligated. The anterior and posterior leaves of the broad ligament were divided with Ace harmonic. The IP ligaments were  from surrounding structures and pedicles were created using right angle clamps, ACE harmonic and 0-Vicryl ties.     The bladder flap was created.  The uterine arteries were skeletonized bilaterally, clamped, cut with Ace Harmonic, and suture ligated.  Successive and progressive pedicles were created to ligate the cardinal and uterosacral ligaments. Suture at the pedicles incorporating the uterosacral ligaments and the the vaginal angles were clamped and retained. Specimen was amputated with curved scissors from the vagina, inspected, and handed off the field for permanent surgical pathology.   Vaginal cuff was closed with 0 Vicryl suture incorporating the vaginal cuff angles and uterosacral ligaments.      The pelvis was copiously irrigated with warmed sterile water.  Hemostasis was assured. The fascia was closed with #1 looped PDS from the superior and inferior fascial angles, respectively, tying in the midline. Subcutaneous tissue was irrigated.  Hemostasis was achieved with the Bovie. Subcutaneous tissue was reapproximated with 2-0 Vicryl in a simple running stitch and the skin was closed with 3-0 Monocryl in a subcuticular closure. Skinaffix was applied over the incision.    The patient tolerated the procedure well. Sponge, lap, needle, and  instrument counts were correct times three. Dr. Watts was present and scrubbed for the entire procedure.        Ajit Michael MD     Date: 10/8/2018  Time: 9:03 AM     I participated in all key aspects of this patient's surgical care.  The resident acted under my direct supervision for the entirety of the procedure.    Deysi Watts MD  10/08/18                        Ajti Michael MD  10/08/18  9:02 AM

## 2018-10-09 ENCOUNTER — TELEPHONE (OUTPATIENT)
Dept: GYNECOLOGIC ONCOLOGY | Facility: CLINIC | Age: 73
End: 2018-10-09

## 2018-10-09 LAB
ANION GAP SERPL CALCULATED.3IONS-SCNC: 6 MMOL/L (ref 3–11)
BUN BLD-MCNC: 5 MG/DL (ref 9–23)
BUN/CREAT SERPL: 12.2 (ref 7–25)
CALCIUM SPEC-SCNC: 8.1 MG/DL (ref 8.7–10.4)
CHLORIDE SERPL-SCNC: 101 MMOL/L (ref 99–109)
CO2 SERPL-SCNC: 29 MMOL/L (ref 20–31)
CREAT BLD-MCNC: 0.41 MG/DL (ref 0.6–1.3)
DEPRECATED RDW RBC AUTO: 42.9 FL (ref 37–54)
ERYTHROCYTE [DISTWIDTH] IN BLOOD BY AUTOMATED COUNT: 13.7 % (ref 11.3–14.5)
GFR SERPL CREATININE-BSD FRML MDRD: >150 ML/MIN/1.73
GLUCOSE BLD-MCNC: 89 MG/DL (ref 70–100)
HCT VFR BLD AUTO: 36 % (ref 34.5–44)
HGB BLD-MCNC: 11.4 G/DL (ref 11.5–15.5)
MCH RBC QN AUTO: 27.1 PG (ref 27–31)
MCHC RBC AUTO-ENTMCNC: 31.7 G/DL (ref 32–36)
MCV RBC AUTO: 85.5 FL (ref 80–99)
PLATELET # BLD AUTO: 224 10*3/MM3 (ref 150–450)
PMV BLD AUTO: 9.7 FL (ref 6–12)
POTASSIUM BLD-SCNC: 3.6 MMOL/L (ref 3.5–5.5)
RBC # BLD AUTO: 4.21 10*6/MM3 (ref 3.89–5.14)
SODIUM BLD-SCNC: 136 MMOL/L (ref 132–146)
WBC NRBC COR # BLD: 11.45 10*3/MM3 (ref 3.5–10.8)

## 2018-10-09 PROCEDURE — 85027 COMPLETE CBC AUTOMATED: CPT | Performed by: NURSE PRACTITIONER

## 2018-10-09 PROCEDURE — 25010000002 ENOXAPARIN PER 10 MG: Performed by: OBSTETRICS & GYNECOLOGY

## 2018-10-09 PROCEDURE — 80048 BASIC METABOLIC PNL TOTAL CA: CPT | Performed by: NURSE PRACTITIONER

## 2018-10-09 RX ORDER — ACETAMINOPHEN 325 MG/1
650 TABLET ORAL EVERY 6 HOURS SCHEDULED
Qty: 60 TABLET | Refills: 0 | Status: SHIPPED | OUTPATIENT
Start: 2018-10-09

## 2018-10-09 RX ORDER — PSEUDOEPHEDRINE HCL 30 MG
200 TABLET ORAL 2 TIMES DAILY
Qty: 60 EACH | Refills: 1 | Status: SHIPPED | OUTPATIENT
Start: 2018-10-09

## 2018-10-09 RX ORDER — ONDANSETRON 4 MG/1
4 TABLET, FILM COATED ORAL EVERY 6 HOURS PRN
Qty: 10 TABLET | Refills: 0 | Status: ON HOLD | OUTPATIENT
Start: 2018-10-09 | End: 2020-10-22

## 2018-10-09 RX ORDER — POLYETHYLENE GLYCOL 3350 17 G/17G
17 POWDER, FOR SOLUTION ORAL DAILY
Qty: 578 G | Refills: 0 | OUTPATIENT
Start: 2018-10-09 | End: 2021-02-15

## 2018-10-09 RX ORDER — IBUPROFEN 600 MG/1
600 TABLET ORAL EVERY 6 HOURS PRN
Qty: 30 TABLET | Refills: 0 | Status: SHIPPED | OUTPATIENT
Start: 2018-10-09

## 2018-10-09 RX ADMIN — DOCUSATE SODIUM 200 MG: 100 CAPSULE, LIQUID FILLED ORAL at 21:10

## 2018-10-09 RX ADMIN — ENOXAPARIN SODIUM 40 MG: 40 INJECTION SUBCUTANEOUS at 08:22

## 2018-10-09 RX ADMIN — ONDANSETRON 4 MG: 4 TABLET, FILM COATED ORAL at 10:30

## 2018-10-09 RX ADMIN — DOCUSATE SODIUM 200 MG: 100 CAPSULE, LIQUID FILLED ORAL at 08:22

## 2018-10-09 RX ADMIN — IBUPROFEN 600 MG: 600 TABLET ORAL at 21:22

## 2018-10-09 RX ADMIN — LOSARTAN POTASSIUM 50 MG: 50 TABLET ORAL at 08:22

## 2018-10-09 RX ADMIN — IBUPROFEN 600 MG: 600 TABLET ORAL at 13:51

## 2018-10-09 RX ADMIN — SIMETHICONE CHEW TAB 80 MG 80 MG: 80 TABLET ORAL at 08:24

## 2018-10-09 RX ADMIN — ACETAMINOPHEN 650 MG: 325 TABLET ORAL at 06:11

## 2018-10-09 RX ADMIN — ACETAMINOPHEN 650 MG: 325 TABLET ORAL at 12:02

## 2018-10-09 RX ADMIN — SIMETHICONE CHEW TAB 80 MG 80 MG: 80 TABLET ORAL at 21:22

## 2018-10-09 RX ADMIN — ACETAMINOPHEN 650 MG: 325 TABLET ORAL at 17:30

## 2018-10-09 RX ADMIN — OXYCODONE HYDROCHLORIDE 10 MG: 5 TABLET ORAL at 06:16

## 2018-10-09 NOTE — TELEPHONE ENCOUNTER
[facility]             [facility] called [phone] and wanted to inquire about changing the RICCARDO for this patient if possible due to the cost.....      Returned call to pharmacy, insurance will not cover the packets, will cover the bottle.  Informed Dr. Watts script done for powder once daily per Dr. Watts's v/o.

## 2018-10-09 NOTE — PLAN OF CARE
"Problem: Patient Care Overview  Goal: Plan of Care Review  Outcome: Ongoing (interventions implemented as appropriate)   10/09/18 8860   Coping/Psychosocial   Plan of Care Reviewed With patient   OTHER   Outcome Summary Patient feeling \"Ok\" today. C/o some nausea this morning, pain well controlled, ambulated x1 20 ft. On room air. Abdominal binder intact.    Plan of Care Review   Progress no change     Goal: Individualization and Mutuality  Outcome: Ongoing (interventions implemented as appropriate)    Goal: Discharge Needs Assessment  Outcome: Ongoing (interventions implemented as appropriate)    Goal: Interprofessional Rounds/Family Conf  Outcome: Ongoing (interventions implemented as appropriate)      Problem: Pain, Acute (Adult)  Goal: Acceptable Pain Control/Comfort Level  Outcome: Ongoing (interventions implemented as appropriate)      Problem: Hysterectomy (Adult)  Goal: Signs and Symptoms of Listed Potential Problems Will be Absent, Minimized or Managed (Hysterectomy)  Outcome: Ongoing (interventions implemented as appropriate)    Goal: Anesthesia/Sedation Recovery  Outcome: Ongoing (interventions implemented as appropriate)        "

## 2018-10-09 NOTE — PROGRESS NOTES
Gynecologic Oncology   Daily Progress Note    Subjective   Patient did well overnight.  Her pain is controlled.  She is not having nausea or emesis.  She is tolerating PO intake overnight and has a regular diet this AM. Ibanez catheter removed 0615, patient has not voided since removal. Patient is not passing gas.  She has not ambulated.  She is using her incentive spirometer.        Objective   Temp:  [98.2 °F (36.8 °C)-98.8 °F (37.1 °C)] 98.2 °F (36.8 °C)  Heart Rate:  [69-90] 73  Resp:  [14-18] 16  BP: (128-172)/(60-86) 133/64  Vitals:    10/09/18 0404   BP: 133/64   Pulse: 73   Resp: 16   Temp: 98.2 °F (36.8 °C)   SpO2: 97%     I/O last 3 completed shifts:  In: 1440 [P.O.:640; I.V.:800]  Out: 800 [Urine:750; Blood:50]     GENERAL: Alert, well-appearing female in no apparent distress.    HEENT: Sclera anicteric, normal eyelids. Head normocephalic, atraumatic. Mucus membranes moist.  Normal dentition.  Trachea midline    CARDIOVASCULAR: Normal rate, regular rhythm, no murmurs, rubs, or gallops.    RESPIRATORY: Clear to auscultation bilaterally, normal respiratory effort  GASTROINTESTINAL:  Soft, appropriately tender, non-distended, no rebound or guarding.  Positive active bowel sounds.  Incision c/d/i.  GENITOURINARY: Ibanez in place.  SKIN:  Warm, dry, well-perfused.    PSYCHIATRIC: AO x3, with appropriate affect, normal thought processes  MSK/EXTREMITIES: FROMx4.  No digital cyanosis.  Symmetric. No peripheral edema.  +SCDs.    Lab Results   Component Value Date    WBC 8.89 10/02/2018    HGB 14.0 10/02/2018    HCT 43.3 10/02/2018    MCV 84.6 10/02/2018     10/02/2018    NEUTROABS 7.46 10/02/2018    GLUCOSE 103 (H) 10/05/2018    BUN 7 (L) 10/05/2018    CREATININE 0.47 (L) 10/05/2018     10/05/2018    K 3.5 10/05/2018     10/05/2018    CO2 30.0 10/05/2018    CALCIUM 9.0 10/05/2018         Assessment/Plan   Issa Marleny is a 72 y.o. female POD1 s/p EXPLORATORY LAPAROTOMY, TOTAL ABDOMINAL  HYSTERECTOMY, BILATERAL SALPINGO-OOPHORECTOMY    # Post-operative care  -Routine care: encourage ambulation, IS use  -Regular diet this AM  -PO pain control  -HLIV  -Bowel regimen  -F/U spontaneous void   -Wean nasal canula  -F/U AM CBC    # HTN  -Continue home losartan  -Within normal limits overnight.     # FEN/PPX  -Reg diet/HLIV  -Lovenox ppx  -SCD's    #  Disposition  -Anticipate d/c home when goals of discharge met    Ajit Michael MD  10/09/18  6:22 AM     I saw and evaluated the patient at approximately 10:15. I agree with the findings and the plan of care as documented in the note.    She had developed nausea but no emesis.  She was holding a basin and was concerned about eating solids.  She is encouraged to drink.  We'll continue to monitor.  No discharge until tolerating a diet.    Deysi Watts MD  10/09/18  2:05 PM

## 2018-10-09 NOTE — PLAN OF CARE
Problem: Patient Care Overview  Goal: Plan of Care Review   10/09/18 1473   Coping/Psychosocial   Plan of Care Reviewed With patient   OTHER   Outcome Summary Pt asleep most of HS on 2L Abd binder in place. Incision w/liquid skin intact. Pain controlled by Tylelnol/Lortab.    Plan of Care Review   Progress improving

## 2018-10-09 NOTE — DISCHARGE SUMMARY
Gynecologic Oncology   [facility]   Discharge Summary    Date of Admission: 10/2/2018    Date of Discharge: 10/11/18    Admission Diagnoses:    1. Primary condition: Pelvic mass  2. List chronic medical conditions: HTN    Discharge Diagnoses:   1. HTN    Hospital Course:  Issa Farmer is a 72 y.o. female who presented with the above diagnosis.  She was met in consultation and decision was made to proceed with surgery.  Please refer to pre-operative H&P for further detail.    On 10/2/2018 she underwent a scheduled EXPLORATORY LAPAROTOMY, TOTAL ABDOMINAL HYSTERECTOMY, BILATERAL SALPINGO-OOPHORECTOMY.  Please refer to dictated operative note for further details.  Post-operatively she experienced some nausea.  Her diet was slowly advanced.  She was maintained on her home medications for her chronic conditions.  Her POD1 labs were unremarkable. By POD3 she was tolerating a general diet, voiding spontaneously, having her pain controlled with oral medications, and otherwise meeting criteria for discharge and she was discharged home in stable condition.    Discharge Medications:     Discharge Medications      ASK your doctor about these medications      Instructions Start Date   losartan 50 MG tablet  Commonly known as:  COZAAR   50 mg, Oral, Daily             Discharge Instructions:  She was instructed to call or return to medical attention for fever greater than 100.4, inability to tolerate food or liquid, inability to urinate or pass gas, severe pain, questions regarding medications, concerns regarding the incisions, vaginal bleeding or discharge, or for any other acute concerns.  She was also instructed not to drive while taking narcotic pain medications, to abide by pelvic rest, to avoid tub baths, and to avoid heavy lifting for at least 6 weeks.    Condition at Discharge: Stable    Discharge Destination: Home    Results pending at time of discharge: Final surgical pathology     Follow Up: She will be  seen in two weeks for post-operative follow up      Electronically Signed by: Ajit Michael MD  Date: 10/11/2018      Time:  6:56 AM    I saw and evaluated the patient. I agree with the findings and the plan of care as documented in the note.    Deysi Watts MD  10/12/18  11:16 AM

## 2018-10-09 NOTE — PROGRESS NOTES
Continued Stay Note  [facility]     Patient Name: Issa Farmer  MRN: [id number]  Today's Date: 10/9/2018    Admit Date: 10/2/2018          Discharge Plan     Row Name 10/09/18 1242       Plan    Plan Home with family    Patient/Family in Agreement with Plan yes    Plan Comments Spoke with Ms. Farmer. Her plan is to discharge home with assistance from her sister, Skyla. She doesn't think she is feeling well enough to discharge home today. Will continue to follow.               Discharge Codes    No documentation.       Expected Discharge Date and Time     Expected Discharge Date Expected Discharge Time    Oct 9, 2018             Destiny Gunter RN

## 2018-10-10 ENCOUNTER — DOCUMENTATION (OUTPATIENT)
Dept: GYNECOLOGIC ONCOLOGY | Facility: CLINIC | Age: 73
End: 2018-10-10

## 2018-10-10 LAB
CYTO UR: NORMAL
LAB AP CASE REPORT: NORMAL
LAB AP CLINICAL INFORMATION: NORMAL
Lab: NORMAL
PATH REPORT.FINAL DX SPEC: NORMAL
PATH REPORT.GROSS SPEC: NORMAL

## 2018-10-10 PROCEDURE — 25010000002 ENOXAPARIN PER 10 MG: Performed by: OBSTETRICS & GYNECOLOGY

## 2018-10-10 PROCEDURE — 25010000002 ONDANSETRON PER 1 MG: Performed by: HOSPITALIST

## 2018-10-10 RX ORDER — FAMOTIDINE 20 MG/1
20 TABLET, FILM COATED ORAL 2 TIMES DAILY
Status: DISCONTINUED | OUTPATIENT
Start: 2018-10-10 | End: 2018-10-11 | Stop reason: HOSPADM

## 2018-10-10 RX ORDER — ACETAMINOPHEN 160 MG/5ML
650 SOLUTION ORAL EVERY 6 HOURS SCHEDULED
Status: DISCONTINUED | OUTPATIENT
Start: 2018-10-10 | End: 2018-10-11 | Stop reason: HOSPADM

## 2018-10-10 RX ADMIN — ENOXAPARIN SODIUM 40 MG: 40 INJECTION SUBCUTANEOUS at 08:26

## 2018-10-10 RX ADMIN — ONDANSETRON 4 MG: 2 INJECTION INTRAMUSCULAR; INTRAVENOUS at 06:35

## 2018-10-10 RX ADMIN — ONDANSETRON 4 MG: 2 INJECTION INTRAMUSCULAR; INTRAVENOUS at 13:21

## 2018-10-10 RX ADMIN — DOCUSATE SODIUM 200 MG: 100 CAPSULE, LIQUID FILLED ORAL at 08:26

## 2018-10-10 RX ADMIN — FAMOTIDINE 20 MG: 20 TABLET ORAL at 08:26

## 2018-10-10 RX ADMIN — ACETAMINOPHEN 650 MG: 325 TABLET ORAL at 01:00

## 2018-10-10 RX ADMIN — ACETAMINOPHEN 650 MG: 650 SOLUTION ORAL at 23:15

## 2018-10-10 RX ADMIN — DOCUSATE SODIUM 200 MG: 100 CAPSULE, LIQUID FILLED ORAL at 20:14

## 2018-10-10 RX ADMIN — LOSARTAN POTASSIUM 50 MG: 50 TABLET ORAL at 08:26

## 2018-10-10 RX ADMIN — FAMOTIDINE 20 MG: 20 TABLET ORAL at 20:14

## 2018-10-10 RX ADMIN — ACETAMINOPHEN 650 MG: 325 TABLET ORAL at 08:36

## 2018-10-10 RX ADMIN — IBUPROFEN 600 MG: 600 TABLET ORAL at 04:20

## 2018-10-10 RX ADMIN — ACETAMINOPHEN 650 MG: 650 SOLUTION ORAL at 13:21

## 2018-10-10 RX ADMIN — OXYCODONE HYDROCHLORIDE 5 MG: 5 TABLET ORAL at 23:15

## 2018-10-10 RX ADMIN — OXYCODONE HYDROCHLORIDE 5 MG: 5 TABLET ORAL at 10:25

## 2018-10-10 RX ADMIN — ACETAMINOPHEN 650 MG: 650 SOLUTION ORAL at 17:45

## 2018-10-10 RX ADMIN — OXYCODONE HYDROCHLORIDE 5 MG: 5 TABLET ORAL at 17:49

## 2018-10-10 RX ADMIN — OXYCODONE HYDROCHLORIDE 5 MG: 5 TABLET ORAL at 13:22

## 2018-10-10 NOTE — PLAN OF CARE
Problem: Patient Care Overview  Goal: Plan of Care Review  Outcome: Ongoing (interventions implemented as appropriate)   10/10/18 0510   Coping/Psychosocial   Plan of Care Reviewed With patient   OTHER   Outcome Summary VSS. Has had episodes of nausea without vomiting. Appetite improving some. Liquid supplements added today. Ambulated in villatoro with minimal assist. UOP adequate; passing flatus. Abd binder continues.Family at BS. Pain controlled with oral pain meds.   Plan of Care Review   Progress improving     Goal: Individualization and Mutuality  Outcome: Ongoing (interventions implemented as appropriate)    Goal: Interprofessional Rounds/Family Conf  Outcome: Ongoing (interventions implemented as appropriate)      Problem: Pain, Acute (Adult)  Goal: Acceptable Pain Control/Comfort Level  Outcome: Ongoing (interventions implemented as appropriate)      Problem: Hysterectomy (Adult)  Goal: Anesthesia/Sedation Recovery  Outcome: Ongoing (interventions implemented as appropriate)      Problem: Nutrition, Imbalanced: Inadequate Oral Intake (Adult)  Goal: Identify Related Risk Factors and Signs and Symptoms  Outcome: Ongoing (interventions implemented as appropriate)    Goal: Improved Oral Intake  Outcome: Ongoing (interventions implemented as appropriate)

## 2018-10-10 NOTE — PROGRESS NOTES
"                  Clinical Nutrition     Nutrition Assessment/Nutrition Support Assessment   Reason for Visit:   LOS      Patient Name: Issa Farmer  YOB: 1945  MRN: [id number]  Date of Encounter: 10/10/18 8:23 AM  Admission date: 10/2/2018      Nutrition Assessment   Assessment     Adm diagnosis     HTN (hypertension)      PMH: She  has a past medical history of Cancer (CMS/HCC) and Hypertension.   PSxH: She  has a past surgical history that includes Mastectomy; Tubal ligation; Replacement total knee; and exploratory laparotomy, total abdominal hysterectomy with tumor debulking (N/A, 10/8/2018).     Other nutrition related factors:  Abdominal pain   Pelvic mass  (10/8) exp lap, total abdominal hysterectomy, BL salpingo-oophorectomy     Diet orders since adm  10/2 NPO  10/3 Clear liquids  10/5 Regular  10/6 Clear liquid  10/8 NPO  10/8 Regular      Reported/Observed/Food/Nutrition Related History:      Nausea overnight. BM x 1 (Oct 10-11). Pt states she is very nauseous at time of RD follow up, states she feels very close to throwing up. Pt states she has had nausea since admission and has gotten worse since surgery. Pt states she has eaten very minimal amounts when she has had an active diet. Pts daughter states pt has not been weighed since admission. Pts daughter did state pt ate a grilled cheese sandwich and some of a sweet potato yesterday but then became nauseous and had a bad night, she provides pt does not have any food allergies. Per pts RN pt has not vomited today. Pt states she was eating well and had not had any weight loss PTA. RD spoke with Dr. Michael per Dr. Guzman's office to inquire about initiation of PPN-he wishes to hold off on this for now.     Anthropometrics     Height: 165.1 cm (65\")  Last filed wt: Weight: 78.5 kg (173 lb) (10/02/18 0934)  Weight Method: Stated    BMI: BMI (Calculated): 28.8  Overweight: 25.0-29.9kg/m2     Ideal Body Weight (IBW) (kg): 57.29        Labs " reviewed       Medications reviewed   Pertinent:  Colace    Nutrition Focused Physical Exam     Unable to perform exam due to: Potential for patient discomfort-nausea     Current Nutrition Prescription     PO: Diet Regular; Thin      Intake: insufficient data  50% x 1 meal    Needs Assessment       Height used 65in   Weight used 78.5kg      Estimated need Method/Equation used Result    Energy/Calorie need   ~1800kcals/d   20-25kcals/kg actbw    1570-1963kcals    Protein   87g/day   79-94g    Fluid  2748mL/day  35mL/kg actbw              Nutrition Diagnosis     10/10  Problem Inadequate energy intake, inadequate protein intake    Etiology nausea   Signs/Symptoms Poor PO intake since admission, continues due to nausea        Nutrition Intervention   1.  Follow treatment progress, Care plan reviewed  2. Pt with current peripheral line. Recommend starting PPN until po adequacy is               established. Recommendation: Initiate PPN via peripheral line D8.75%, AA3.25% @20mL/hr and increase by 20mL Q8hrs to goal rate of 110mL/hr. Provide 200mL 20% Intralipid daily.     At Target Goal Volume     % Est needs   Volume  2640ml     Energy/kcals 1528kcals 85%   Protein 86g pro 99%   GIR: 2.04mg/kg/min          3. Add ONS, Boost breeze TID, Boost Plus with lunch tray today to assess         acceptance.   4. Weight ordered-no measured wt available       Goal:   General: Nutrition support treatment  PO: Increase intake  Additional goals:  Initiate PN to provide percentage of estimated needs while nausea continues.       Monitoring/Evaluation:   Per protocol, I&O, PO intake, Supplement intake, Weight, GI status      Will Continue to follow per protocol      Deana Barnett RDN, LD  Time Spent: 45min

## 2018-10-10 NOTE — PROGRESS NOTES
Gynecologic Oncology   Daily Progress Note    Subjective   Patient reports feeling nauseous overnight. She states zofran has kept her nausea at bay.  She does report heart burn like sensation with lots of burping. She is not having emesis.  She is tolerating PO intake overnight and has a regular diet this AM but is not eating much. She is voiding spontaneously. She had one episode of hypoxia overnight however all other oxygen saturations were within normal limits. Patient is not passing gas.  She has ambulated.  She is using her incentive spirometer.        Objective   Temp:  [98.2 °F (36.8 °C)-99 °F (37.2 °C)] 99 °F (37.2 °C)  Heart Rate:  [84-99] 84  Resp:  [18] 18  BP: (134-185)/(62-86) 134/62  Vitals:    10/10/18 1700   BP: 134/62   Pulse: 84   Resp: 18   Temp: 99 °F (37.2 °C)   SpO2: 93%     I/O last 3 completed shifts:  In: 100 [P.O.:100]  Out: 2525 [Urine:2525]     GENERAL: Alert, well-appearing female in no apparent distress.    HEENT: Sclera anicteric, normal eyelids. Head normocephalic, atraumatic. Mucus membranes moist.  Normal dentition.  Trachea midline    CARDIOVASCULAR: Normal rate, regular rhythm, no murmurs, rubs, or gallops.    RESPIRATORY: Clear to auscultation bilaterally, normal respiratory effort  GASTROINTESTINAL:  Soft, appropriately tender, non-distended, no rebound or guarding.  Positive hypoactive bowel sounds.  Incision c/d/i.  GENITOURINARY: Ibanez in place.  SKIN:  Warm, dry, well-perfused.    PSYCHIATRIC: AO x3, with appropriate affect, normal thought processes  MSK/EXTREMITIES: FROMx4.  No digital cyanosis.  Symmetric. No peripheral edema.  +SCDs.    Lab Results   Component Value Date    WBC 11.45 (H) 10/09/2018    HGB 11.4 (L) 10/09/2018    HCT 36.0 10/09/2018    MCV 85.5 10/09/2018     10/09/2018    NEUTROABS 7.46 10/02/2018    GLUCOSE 89 10/09/2018    BUN 5 (L) 10/09/2018    CREATININE 0.41 (L) 10/09/2018     10/09/2018    K 3.6 10/09/2018     10/09/2018    CO2  29.0 10/09/2018    CALCIUM 8.1 (L) 10/09/2018         Assessment/Plan   Issa Farmer is a 72 y.o. female POD2 s/p EXPLORATORY LAPAROTOMY, TOTAL ABDOMINAL HYSTERECTOMY, BILATERAL SALPINGO-OOPHORECTOMY    # Post-operative care  -Routine care: encourage ambulation, IS use  -Regular diet this AM, told patient not to eat if not hungry  -PO pain control  -HLIV  -Bowel regimen  -spontaneously voiding   -Wean nasal canula  -Pepcid BID  -Will be quick to make NPO with IVF if patient has emesis from post operative ileus.     # HTN  -Continue home losartan  -Within normal limits overnight.     # FEN/PPX  -Reg diet/HLIV  -Lovenox ppx  -SCD's    #  Disposition  -Anticipate d/c home when goals of discharge met        Patient was seen and examined with Dr. Michael,  resident, who performed portions of the examination and documentation for this patient's care under my direct supervision.  I agree with the above documentation and plan.    Patient improved this PM.  Asking about diet options, wanting more food.  Will not proceed with TPN for now.      Hopefully, will be fit for d/c soon.    Deysi Watts MD  10/10/18  8:34 PM

## 2018-10-10 NOTE — PROGRESS NOTES
Continued Stay Note  [facility]     Patient Name: Issa Farmer  MRN: [id number]  Today's Date: 10/10/2018    Admit Date: 10/2/2018          Discharge Plan     Row Name 10/10/18 1217       Plan    Plan Home with family assistance    Plan Comments Patient will not be discharged today due to complaints of nausea. Her plan remains the same: home with assistance from her sister, Skyla. Patient has been ambulating, per nursing. No anticipated needs.     Final Discharge Disposition Code 01 - home or self-care              Discharge Codes    No documentation.       Expected Discharge Date and Time     Expected Discharge Date Expected Discharge Time    Oct 10, 2018             Destiny Gunter RN

## 2018-10-10 NOTE — PLAN OF CARE
Problem: Patient Care Overview  Goal: Plan of Care Review  Outcome: Ongoing (interventions implemented as appropriate)   10/10/18 3834   Coping/Psychosocial   Plan of Care Reviewed With patient   OTHER   Outcome Summary no nausea noted this shift, did c/o some gas pain, meds given and relief noted, tylenol and motrin for pain noted to be effective, ambulated in villatoro, family present, abd binder in place, VSS, NAD   Plan of Care Review   Progress improving

## 2018-10-10 NOTE — PROGRESS NOTES
RC'D today. Filled out    Placed in Stefanie's box for signature.    Paperwork returned.  Intermittent 10/02/2018-10/01/2019    Faxed to patient work and emailed copy to patient.

## 2018-10-11 VITALS
RESPIRATION RATE: 18 BRPM | DIASTOLIC BLOOD PRESSURE: 65 MMHG | BODY MASS INDEX: 27.66 KG/M2 | TEMPERATURE: 98.8 F | OXYGEN SATURATION: 92 % | SYSTOLIC BLOOD PRESSURE: 141 MMHG | HEIGHT: 65 IN | WEIGHT: 166 LBS | HEART RATE: 76 BPM

## 2018-10-11 PROCEDURE — 25010000002 ENOXAPARIN PER 10 MG: Performed by: OBSTETRICS & GYNECOLOGY

## 2018-10-11 PROCEDURE — 94640 AIRWAY INHALATION TREATMENT: CPT

## 2018-10-11 PROCEDURE — 25010000002 HYDROMORPHONE PER 4 MG: Performed by: OBSTETRICS & GYNECOLOGY

## 2018-10-11 RX ORDER — FAMOTIDINE 20 MG/1
20 TABLET, FILM COATED ORAL 2 TIMES DAILY
Qty: 60 TABLET | Refills: 0 | Status: SHIPPED | OUTPATIENT
Start: 2018-10-11 | End: 2020-10-08

## 2018-10-11 RX ORDER — IPRATROPIUM BROMIDE AND ALBUTEROL SULFATE 2.5; .5 MG/3ML; MG/3ML
3 SOLUTION RESPIRATORY (INHALATION)
Status: DISCONTINUED | OUTPATIENT
Start: 2018-10-11 | End: 2018-10-11 | Stop reason: HOSPADM

## 2018-10-11 RX ORDER — L.ACID,PARA/B.BIFIDUM/S.THERM 8B CELL
1 CAPSULE ORAL DAILY
Status: DISCONTINUED | OUTPATIENT
Start: 2018-10-11 | End: 2018-10-11 | Stop reason: HOSPADM

## 2018-10-11 RX ADMIN — ACETAMINOPHEN 650 MG: 650 SOLUTION ORAL at 13:10

## 2018-10-11 RX ADMIN — SIMETHICONE CHEW TAB 80 MG 80 MG: 80 TABLET ORAL at 03:30

## 2018-10-11 RX ADMIN — DOCUSATE SODIUM 200 MG: 100 CAPSULE, LIQUID FILLED ORAL at 08:07

## 2018-10-11 RX ADMIN — ACETAMINOPHEN 650 MG: 650 SOLUTION ORAL at 05:48

## 2018-10-11 RX ADMIN — ENOXAPARIN SODIUM 40 MG: 40 INJECTION SUBCUTANEOUS at 08:07

## 2018-10-11 RX ADMIN — ACETAMINOPHEN 650 MG: 650 SOLUTION ORAL at 17:21

## 2018-10-11 RX ADMIN — IPRATROPIUM BROMIDE AND ALBUTEROL SULFATE 3 ML: 2.5; .5 SOLUTION RESPIRATORY (INHALATION) at 13:15

## 2018-10-11 RX ADMIN — HYDROMORPHONE HYDROCHLORIDE 0.5 MG: 1 INJECTION, SOLUTION INTRAMUSCULAR; INTRAVENOUS; SUBCUTANEOUS at 17:21

## 2018-10-11 RX ADMIN — LOSARTAN POTASSIUM 50 MG: 50 TABLET ORAL at 08:07

## 2018-10-11 RX ADMIN — OXYCODONE HYDROCHLORIDE 10 MG: 5 TABLET ORAL at 08:07

## 2018-10-11 RX ADMIN — OXYCODONE HYDROCHLORIDE 10 MG: 5 TABLET ORAL at 11:51

## 2018-10-11 RX ADMIN — Medication 1 CAPSULE: at 10:46

## 2018-10-11 RX ADMIN — OXYCODONE HYDROCHLORIDE 10 MG: 5 TABLET ORAL at 16:24

## 2018-10-11 RX ADMIN — OXYCODONE HYDROCHLORIDE 5 MG: 5 TABLET ORAL at 03:30

## 2018-10-11 RX ADMIN — SIMETHICONE CHEW TAB 80 MG 80 MG: 80 TABLET ORAL at 11:51

## 2018-10-11 RX ADMIN — FAMOTIDINE 20 MG: 20 TABLET ORAL at 08:07

## 2018-10-11 RX ADMIN — ONDANSETRON 4 MG: 4 TABLET, FILM COATED ORAL at 16:24

## 2018-10-11 RX ADMIN — SIMETHICONE CHEW TAB 80 MG 80 MG: 80 TABLET ORAL at 16:24

## 2018-10-11 NOTE — PLAN OF CARE
Problem: Patient Care Overview  Goal: Plan of Care Review  Outcome: Ongoing (interventions implemented as appropriate)   10/11/18 0421   Coping/Psychosocial   Plan of Care Reviewed With patient;family   OTHER   Outcome Summary Pt has had no c/o nausea or vomiting during shift. Pt continues to c/o incisional pain, pain controled with prn medications. Pt ambulated in the villatoro with family.    Plan of Care Review   Progress improving       Problem: Pain, Acute (Adult)  Goal: Acceptable Pain Control/Comfort Level  Outcome: Ongoing (interventions implemented as appropriate)   10/11/18 0421   Pain, Acute (Adult)   Acceptable Pain Control/Comfort Level making progress toward outcome

## 2018-10-11 NOTE — PROGRESS NOTES
"Nutrition Services    Patient Name:  Issa Farmer  YOB: 1945  MRN: [id number]  Admit Date:  10/2/2018                      Clinical Nutrition      Nutrition Support Assessment  Reason for Visit:   Follow-up protocol        Patient Name: Issa Farmer  YOB: 1945  MRN: [id number]  Date of Encounter: 10/11/18 5:34 PM  Admission date: 10/2/2018        Comments: PO intake cont poor. Family rpt pt takes suppl. Anticipate discharge this afternoon. May wish to consider passive feeding pending tx progression. See note form 10/10 if PN becomes approp.         Nutrition Assessment      Assessment         Adm diagnosis     HTN (hypertension)        PMH: She  has a past medical history of Cancer (CMS/HCC) and Hypertension.   PSxH: She  has a past surgical history that includes Mastectomy; Tubal ligation; Replacement total knee; and exploratory laparotomy, total abdominal hysterectomy with tumor debulking (N/A, 10/8/2018).      Other nutrition related factors:  Abdominal pain   Pelvic mass  (10/8) exp lap, total abdominal hysterectomy, BL salpingo-oophorectomy      Diet orders since adm  10/2 NPO  10/3 Clear liquids  10/5 Regular  10/6 Clear liquid  10/8 NPO  10/8 Regular        Reported/Observed/Food/Nutrition Related History:      Family rpt pt takes suppl. Anticipate discharge today. Hope p.o will  as pt recovers from procedure        Anthropometrics      This adm:     Height: 165.1 cm (65\")  Last filed wt: Weight: 78.5 kg (173 lb) (10/02/18 0934)  Weight Method: Stated     BMI: BMI (Calculated): 28.8  Overweight: 25.0-29.9kg/m2      Ideal Body Weight (IBW) (kg): 57.29        Labs reviewed      No new since last dietitian visit.     Medications reviewed   Pertinent: colace, pepsid, probiotic, simethicone muliple pain meds         Current Nutrition Prescription     PO: Diet Regular; Thin        Intake: 17% consumption of 5 meals chartedl    Nutrition Diagnosis     10/10  Problem " Inadequate energy intake, inadequate protein intake    Etiology nausea   Signs/Symptoms Poor PO intake since admission, continues due to nausea         Goal:   General: Nutrition support treatment  PO: Increase intake  EN/PN: intitiate EN or PN if indicated      Nutrition Intervention    Follow treatment progress, Care plan reviewed, Encourage intake        Monitoring/Evaluation:   PO intake, Supplement intake, Pertinent labs, GI status, Symptoms if adm extended      Will Continue to follow per protocol      Susana Donaldson RD  Time Spent: 25 min          Electronically signed by:  Susana Donaldson RD  10/11/18 6:16 PM

## 2018-10-11 NOTE — PROGRESS NOTES
Gynecologic Oncology   Daily Progress Note    Subjective   Patient reports feeling improved this AM. Reports nausea has improved. She is tolerating some regular diet with peanut butter, peaches, crackers last night. She is not having emesis. She is voiding spontaneously. Patient is passing gas.  She has ambulated.  She is using her incentive spirometer. Lowest O2 saturation of 89 overnight while on room air.       Objective   Temp:  [98.2 °F (36.8 °C)-99 °F (37.2 °C)] 98.9 °F (37.2 °C)  Heart Rate:  [76-99] 76  Resp:  [16-18] 18  BP: (134-171)/(62-76) 151/72  Vitals:    10/11/18 0700   BP: 151/72   Pulse: 76   Resp: 18   Temp: 98.9 °F (37.2 °C)   SpO2: 100%     I/O last 3 completed shifts:  In: 958 [P.O.:958]  Out: 3475 [Urine:3475]     GENERAL: Alert, well-appearing female in no apparent distress.    HEENT: Sclera anicteric, normal eyelids. Head normocephalic, atraumatic. Mucus membranes moist.  Normal dentition.  Trachea midline    CARDIOVASCULAR: Normal rate, regular rhythm, no murmurs, rubs, or gallops.    RESPIRATORY: Clear to auscultation bilaterally, with decreased inspiratory volume. Slightly decreased respiratory effort.   GASTROINTESTINAL:  Soft, appropriately tender, non-distended, no rebound or guarding.  Positive hypoactive bowel sounds.  Incision c/d/i.  GENITOURINARY: Ibanez in place.  SKIN:  Warm, dry, well-perfused.    PSYCHIATRIC: AO x3, with appropriate affect, normal thought processes  MSK/EXTREMITIES: FROMx4.  No digital cyanosis.  Symmetric. No peripheral edema.  +SCDs.    Lab Results   Component Value Date    WBC 11.45 (H) 10/09/2018    HGB 11.4 (L) 10/09/2018    HCT 36.0 10/09/2018    MCV 85.5 10/09/2018     10/09/2018    NEUTROABS 7.46 10/02/2018    GLUCOSE 89 10/09/2018    BUN 5 (L) 10/09/2018    CREATININE 0.41 (L) 10/09/2018     10/09/2018    K 3.6 10/09/2018     10/09/2018    CO2 29.0 10/09/2018    CALCIUM 8.1 (L) 10/09/2018         Assessment/Plan   Issa Farmer is  a 72 y.o. female POD3 s/p EXPLORATORY LAPAROTOMY, TOTAL ABDOMINAL HYSTERECTOMY, BILATERAL SALPINGO-OOPHORECTOMY    # Post-operative care  -Routine care: encourage ambulation, IS use  -Regular diet  -PO pain control  -HLIV  -Bowel regimen  -spontaneously voiding   -Wean nasal canula  -Pepcid BID  -Will be quick to make NPO with IVF if patient has emesis from post operative ileus.     # Hypoxia  -Differential includes deconditioning vs. Sleep apnea. Patient has been admitted for 7 days however most of transient hypoxic episodes (high 80's Oxygen saturation) happen during the early morning hours.   -DUO-Neb this AM  -On room air now, asymptomatic    # HTN  -Continue home losartan  -hypertensive overnight.     # FEN/PPX  -Reg diet/HLIV  -Lovenox ppx  -SCD's    #  Disposition  -Anticipate d/c home later this afternoon.       Ajit Michael MD  10/11/18  8:34 PM    I saw and evaluated the patient. I agree with the findings and the plan of care as documented in the note.    Check O2 sats ambulating on RA.  If <90%, will need home O2.    Start lactobacillus.     Precautions reviewed.  Likely d/c home today.    Deysi Watts MD  10/11/18  9:24 AM

## 2018-10-12 ENCOUNTER — READMISSION MANAGEMENT (OUTPATIENT)
Dept: CALL CENTER | Facility: HOSPITAL | Age: 73
End: 2018-10-12

## 2018-10-12 NOTE — OUTREACH NOTE
Prep Survey      Responses   Facility patient discharged from?  [facility]   Is patient eligible?  Yes   Discharge diagnosis  Acute bilateral lower abdominal pain EXPLORATORY LAPAROTOMY, TOTAL ABDOMINAL HYSTERECTOMY, BILATERAL SALPINGO-OOPHORECTOMY.    Does the patient have one of the following disease processes/diagnoses(primary or secondary)?  General Surgery   Does the patient have Home health ordered?  No   Is there a DME ordered?  No   Prep survey completed?  Yes          Alia Traylor RN

## 2018-10-15 ENCOUNTER — READMISSION MANAGEMENT (OUTPATIENT)
Dept: CALL CENTER | Facility: HOSPITAL | Age: 73
End: 2018-10-15

## 2018-10-15 NOTE — OUTREACH NOTE
General Surgery Week 1 Survey      Responses   Facility patient discharged from?  [facility]   Does the patient have one of the following disease processes/diagnoses(primary or secondary)?  General Surgery   Is there a successful TCM telephone encounter documented?  No   Week 1 attempt successful?  Yes   Call start time  1417   Call end time  1422   Discharge diagnosis  Acute bilateral lower abdominal pain EXPLORATORY LAPAROTOMY, TOTAL ABDOMINAL HYSTERECTOMY, BILATERAL SALPINGO-OOPHORECTOMY.    Meds reviewed with patient/caregiver?  Yes   Is the patient having any side effects they believe may be caused by any medication additions or changes?  No   Does the patient have all medications related to this admission filled (includes all antibiotics, pain medications, etc.)  Yes   Is the patient taking all medications as directed (includes completed medication regime)?  Yes   Does the patient have a follow up appointment scheduled with their surgeon?  Yes   Has the patient kept scheduled appointments due by today?  N/A   Psychosocial issues?  No   Did the patient receive a copy of their discharge instructions?  Yes   Nursing interventions  Reviewed instructions with patient   What is the patient's perception of their health status since discharge?  Improving   Nursing interventions  Nurse provided patient education   Is the patient /caregiver able to teach back basic post-op care?  Continue use of incentive spirometry at least 1 week post discharge, Practice 'cough and deep breath', Drive as instructed by MD in discharge instructions, Take showers only when approved by MD-sponge bathe until then, No tub bath, swimming, or hot tub until instructed by MD, Keep incision areas clean,dry and protected   Is the patient/caregiver able to teach back signs and symptoms of incisional infection?  Fever, Incisional warmth, Increased redness, swelling or pain at the incisonal site, Increased drainage or bleeding   Is the  patient/caregiver able to teach back steps to recovery at home?  Rest and rebuild strength, gradually increase activity, Practice good oral hygiene   Additional teach back comments  does not smoke. Says she is doing very well. No problems with voiding or BMs   Week 1 call completed?  Yes          Nina Smith RN

## 2018-10-23 ENCOUNTER — READMISSION MANAGEMENT (OUTPATIENT)
Dept: CALL CENTER | Facility: HOSPITAL | Age: 73
End: 2018-10-23

## 2018-10-23 NOTE — OUTREACH NOTE
General Surgery Week 2 Survey      Responses   Facility patient discharged from?  [facility]   Does the patient have one of the following disease processes/diagnoses(primary or secondary)?  General Surgery   Week 2 attempt successful?  No   Unsuccessful attempts  Attempt 1          Goldy Rivero RN

## 2018-10-24 ENCOUNTER — OFFICE VISIT (OUTPATIENT)
Dept: GYNECOLOGIC ONCOLOGY | Facility: CLINIC | Age: 73
End: 2018-10-24

## 2018-10-24 VITALS
SYSTOLIC BLOOD PRESSURE: 174 MMHG | HEART RATE: 86 BPM | DIASTOLIC BLOOD PRESSURE: 77 MMHG | BODY MASS INDEX: 27.79 KG/M2 | RESPIRATION RATE: 18 BRPM | OXYGEN SATURATION: 97 % | TEMPERATURE: 98.2 F | WEIGHT: 167 LBS

## 2018-10-24 DIAGNOSIS — Z98.890 POST-OPERATIVE STATE: Primary | ICD-10-CM

## 2018-10-24 PROCEDURE — 99024 POSTOP FOLLOW-UP VISIT: CPT | Performed by: OBSTETRICS & GYNECOLOGY

## 2018-10-24 NOTE — PROGRESS NOTES
Issa Farmer  [id number]  1945      Reason for visit:  Post-op evaluation    History of present illness:  The patient is a 72 y.o. year old female who presents today for treatment and evaluation of the above issues.    73yo female s/p exploratory laparotomy with total abdominal hysterectomy and bilateral salpingo-oophorectomy on 10/08/2018.    The patient is a 72 y.o. female with h/o breast cancer and HTN who presented to [facility] ED 10/1/2018 @ 0100 with acute onset left lower quadrant pain with 10/10 pain. She reports the pain waxes and wanes. She states the pain was associated with nausea and emesis. She has also noted increased right sided abdominal swelling for the past several months. She has never had a colonoscopy.  Regarding her history of breast cancer, she was not treated with chemotherapy or radiation therapy. Patient denied diarrhea, constipation, nausea, emesis, fever, chills, weight loss. She had no other complaints at that time.  Imaging at that time showed left sided complex adnexal mass with multiple septations concerning for possible malignancy.  The decision was made to perform the procedure above.      OBGYN History:  She is a .  She does not use HRT. She does not remember when her last pap test was done, but believes she has never had an abnormal pap.      Oncologic History:   No history exists.         Past Medical History:   Diagnosis Date   • Cancer (CMS/HCC)    • Hypertension        Past Surgical History:   Procedure Laterality Date   • EXPLORATORY LAPAROTOMY, TOTAL ABDOMINAL HYSTERECTOMY WITH TUMOR DEBULKING N/A 10/8/2018    Procedure: EXPLORATORY LAPAROTOMY, TOTAL ABDOMINAL HYSTERECTOMY, BILATERAL SALPINGO-OOPHORECTOMY;  Surgeon: Deysi Watts MD;  Location: [facility];  Service: Gynecology   • MASTECTOMY     • REPLACEMENT TOTAL KNEE     • TUBAL ABDOMINAL LIGATION         MEDICATIONS: The current medication list was reviewed with the patient and updated in the EMR this  date per the Medical Assistant. Medication dosages and frequencies were confirmed to be accurate.      Allergies:  has No Known Allergies.    Social History:   Social History     Social History   • Marital status:      Spouse name: N/A   • Number of children: N/A   • Years of education: N/A     Occupational History   • Not on file.     Social History Main Topics   • Smoking status: Never Smoker   • Smokeless tobacco: Never Used   • Alcohol use No   • Drug use: No   • Sexual activity: Defer     Other Topics Concern   • Not on file     Social History Narrative   • No narrative on file       Family History:  History reviewed. No pertinent family history.    Health Maintenance:    Health Maintenance   Topic Date Due   • TDAP/TD VACCINES (1 - Tdap) 12/26/1962   • ZOSTER VACCINE (1 of 2) 12/26/1993   • PNEUMOCOCCAL VACCINES (65+ LOW/MEDIUM RISK) (1 of 2 - PCV13) 12/26/2008   • INFLUENZA VACCINE  08/01/2018   • COLONOSCOPY  10/02/2018   • MEDICARE ANNUAL WELLNESS  10/09/2018   • MAMMOGRAM  10/09/2018         Review of Systems   Constitutional: Negative for activity change, appetite change, chills, diaphoresis, fatigue, fever and unexpected weight change.   HENT: Negative for mouth sores, postnasal drip, sore throat, trouble swallowing and voice change.    Eyes: Negative for pain, discharge, redness, itching and visual disturbance.   Respiratory: Negative for cough, chest tightness, shortness of breath and wheezing.    Cardiovascular: Negative for chest pain, palpitations and leg swelling.   Gastrointestinal: Negative for abdominal distention, abdominal pain, blood in stool, diarrhea, nausea and vomiting.   Endocrine: Negative for cold intolerance, heat intolerance, polydipsia, polyphagia and polyuria.   Genitourinary: Negative for decreased urine volume, dyspareunia, dysuria, flank pain, frequency, genital sores, hematuria, menstrual problem, pelvic pain, urgency, vaginal bleeding, vaginal discharge and vaginal  pain.   Skin: Negative for color change and rash.   Allergic/Immunologic: Negative for food allergies.   Neurological: Negative for dizziness, speech difficulty, weakness, light-headedness and headaches.   Hematological: Negative for adenopathy. Does not bruise/bleed easily.   Psychiatric/Behavioral: Negative for dysphoric mood.       Physical Exam    Vitals:    10/24/18 1133   BP: 174/77   Pulse: 86   Resp: 18   Temp: 98.2 °F (36.8 °C)   TempSrc: Temporal Artery    SpO2: 97%   Weight: 75.8 kg (167 lb)     Body mass index is 27.79 kg/m².    Wt Readings from Last 3 Encounters:   10/24/18 75.8 kg (167 lb)   10/10/18 75.3 kg (166 lb)         GENERAL: Alert, well-appearing female appearing her stated age who is in no apparent distress.   HEENT: Sclera anicteric. Head normocephalic, atraumatic. Mucus membranes moist.   GASTROINTESTINAL:  Abdomen is soft, non-tender, non-distended, no rebound or guarding, no masses, or hernias. No HSM.  Incision clean, dry, and intact.  EXTREMITIES:   No cyanosis, clubbing, symmetric.     PELVIC exam:    GYNECOLOGIC:  External genitalia are free from lesion. On speculum examination, the vaginal cuff was intact and no lesions were appreciated.  On bimanual examination, no fullness was appreciated.  Uterus, cervix and adnexa were absent.  There was no significant tenderness.  Rectovaginal exam was deferred.    ECOG PS 0    Diagnostic Data:    PATHOLOGY:  Final Diagnosis   1. 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   Adjacent fallopian tube with edema.  Changes suggestive of torsion   2. UTERUS WITH RIGHT FALLOPIAN TUBE AND OVARY:  Endometrium with benign endometrial polyp and background atrophic endometrium.  Unremarkable myometrium and serosa.  Chronic cervicitis without dysplasia.  Unremarkable fallopian tube and ovary.          Ct Abdomen Pelvis Without Contrast    Result Date: 10/2/2018  1.  Large part cystic/part solid mass in the pelvis. Recommend surgical consultation. 2. No acute finding.  D:  10/02/2018 E:  10/02/2018  This report was finalized on 10/2/2018 11:04 AM by Leonard Mendosa.      Xr Chest 1 View    Result Date: 10/4/2018  Chronic appearing pulmonary findings with no acute disease.  D:  10/04/2018 E:  10/04/2018  This report was finalized on 10/4/2018 12:58 PM by Dr. Saul Quinones MD.        Lab Results   Component Value Date    WBC 11.45 (H) 10/09/2018    HGB 11.4 (L) 10/09/2018    HCT 36.0 10/09/2018    MCV 85.5 10/09/2018     10/09/2018    NEUTROABS 7.46 10/02/2018    GLUCOSE 89 10/09/2018    BUN 5 (L) 10/09/2018    CREATININE 0.41 (L) 10/09/2018    EGFRIFNONA >150 10/09/2018     10/09/2018    K 3.6 10/09/2018     10/09/2018    CO2 29.0 10/09/2018    CALCIUM 8.1 (L) 10/09/2018    ALBUMIN 3.61 10/03/2018    AST 17 10/03/2018    ALT 12 10/03/2018    BILITOT 0.4 10/03/2018     Lab Results   Component Value Date     40.1 (H) 10/02/2018           Assessment/Plan   This is a 72 y.o. woman s/p total abdominal hysterectomy with bilateral salpingo-oophorectomy on 10/08/2018.    1. Postop Evaluation  -Overall, she feels very well and is pleased with her care  -Doing well  -Pathology was benign and consistent with ovarian torsion  -No further treatment needed  -Continuation of postoperative precautions as discussed    2. Hypertension  -Stable on losartan    No orders of the defined types were placed in this encounter.      FOLLOW UP: on an a needed basis    Note: Speech recognition transcription software was used to dictate portions of this document.  An attempt at proofreading has been made though minor errors in transcription may still be present.  Please do not hesitate to call our office with any questions.      Electronically Signed by: Dawosn Guevara MD  Date: 10/24/2018       Patient was seen and examined with Dr. Guevara,  resident, who performed portions of the  examination and documentation for this patient's care under my direct supervision.  I agree with the above documentation and plan.    Deysi Watts MD  10/24/18  9:45 PM

## 2018-10-25 ENCOUNTER — READMISSION MANAGEMENT (OUTPATIENT)
Dept: CALL CENTER | Facility: HOSPITAL | Age: 73
End: 2018-10-25

## 2018-10-25 NOTE — OUTREACH NOTE
General Surgery Week 2 Survey      Responses   Facility patient discharged from?  [facility]   Does the patient have one of the following disease processes/diagnoses(primary or secondary)?  General Surgery   Week 2 attempt successful?  No   Unsuccessful attempts  Attempt 2          Alma South RN

## 2018-10-27 ENCOUNTER — READMISSION MANAGEMENT (OUTPATIENT)
Dept: CALL CENTER | Facility: HOSPITAL | Age: 73
End: 2018-10-27

## 2018-10-27 NOTE — OUTREACH NOTE
General Surgery Week 2 Survey      Responses   Facility patient discharged from?  [facility]   Does the patient have one of the following disease processes/diagnoses(primary or secondary)?  General Surgery   Week 2 attempt successful?  Yes   Call start time  1028   Call end time  1032   Discharge diagnosis  Acute bilateral lower abdominal pain EXPLORATORY LAPAROTOMY, TOTAL ABDOMINAL HYSTERECTOMY, BILATERAL SALPINGO-OOPHORECTOMY.    Meds reviewed with patient/caregiver?  Yes   Is the patient having any side effects they believe may be caused by any medication additions or changes?  No   Does the patient have all medications related to this admission filled (includes all antibiotics, pain medications, etc.)  Yes   Is the patient taking all medications as directed (includes completed medication regime)?  Yes   Does the patient have a follow up appointment scheduled with their surgeon?  Yes   Has the patient kept scheduled appointments due by today?  Yes   Psychosocial issues?  No   Comments  pt states feeling well, good appetite, minimal pain   Did the patient receive a copy of their discharge instructions?  Yes   Nursing interventions  Reviewed instructions with patient   What is the patient's perception of their health status since discharge?  Improving   Nursing interventions  Nurse provided patient education   Is the patient /caregiver able to teach back basic post-op care?  Drive as instructed by MD in discharge instructions, Take showers only when approved by MD-sponge bathe until then, No tub bath, swimming, or hot tub until instructed by MD, Keep incision areas clean,dry and protected, Do not remove steri-strips   Is the patient/caregiver able to teach back signs and symptoms of incisional infection?  Increased redness, swelling or pain at the incisonal site, Increased drainage or bleeding, Incisional warmth, Pus or odor from incision, Fever   Is the patient/caregiver able to teach back steps to recovery at  home?  Set small, achievable goals for return to baseline health, Rest and rebuild strength, gradually increase activity   Is the patient/caregiver able to teach back the hierarchy of who to call/visit for symptoms/problems? PCP, Specialist, Home health nurse, Urgent Care, ED, 911  Yes   Week 2 call completed?  Yes          Alma South RN

## 2018-11-24 ENCOUNTER — APPOINTMENT (OUTPATIENT)
Dept: CT IMAGING | Facility: HOSPITAL | Age: 73
End: 2018-11-24

## 2018-11-24 ENCOUNTER — HOSPITAL ENCOUNTER (EMERGENCY)
Facility: HOSPITAL | Age: 73
Discharge: HOME | End: 2018-11-24
Attending: EMERGENCY MEDICINE

## 2018-11-24 ENCOUNTER — NURSE TRIAGE (OUTPATIENT)
Dept: CALL CENTER | Facility: HOSPITAL | Age: 73
End: 2018-11-24

## 2018-11-24 VITALS
BODY MASS INDEX: 28.32 KG/M2 | OXYGEN SATURATION: 94 % | HEART RATE: 84 BPM | SYSTOLIC BLOOD PRESSURE: 151 MMHG | TEMPERATURE: 98.4 F | RESPIRATION RATE: 15 BRPM | DIASTOLIC BLOOD PRESSURE: 75 MMHG | HEIGHT: 65 IN | WEIGHT: 170 LBS

## 2018-11-24 DIAGNOSIS — I10 ESSENTIAL HYPERTENSION: ICD-10-CM

## 2018-11-24 DIAGNOSIS — R10.9 POSTOPERATIVE ABDOMINAL PAIN: Primary | ICD-10-CM

## 2018-11-24 DIAGNOSIS — G89.18 POSTOPERATIVE ABDOMINAL PAIN: Primary | ICD-10-CM

## 2018-11-24 LAB
ALBUMIN SERPL-MCNC: 4.44 G/DL (ref 3.2–4.8)
ALBUMIN/GLOB SERPL: 1.9 G/DL (ref 1.5–2.5)
ALP SERPL-CCNC: 58 U/L (ref 25–100)
ALT SERPL W P-5'-P-CCNC: 20 U/L (ref 7–40)
ANION GAP SERPL CALCULATED.3IONS-SCNC: 6 MMOL/L (ref 3–11)
AST SERPL-CCNC: 21 U/L (ref 0–33)
BACTERIA UR QL AUTO: NORMAL /HPF
BASOPHILS # BLD AUTO: 0.02 10*3/MM3 (ref 0–0.2)
BASOPHILS NFR BLD AUTO: 0.2 % (ref 0–1)
BILIRUB SERPL-MCNC: 0.5 MG/DL (ref 0.3–1.2)
BILIRUB UR QL STRIP: NEGATIVE
BUN BLD-MCNC: 9 MG/DL (ref 9–23)
BUN/CREAT SERPL: 18 (ref 7–25)
CALCIUM SPEC-SCNC: 9.1 MG/DL (ref 8.7–10.4)
CHLORIDE SERPL-SCNC: 104 MMOL/L (ref 99–109)
CLARITY UR: CLEAR
CO2 SERPL-SCNC: 30 MMOL/L (ref 20–31)
COLOR UR: YELLOW
CREAT BLD-MCNC: 0.5 MG/DL (ref 0.6–1.3)
DEPRECATED RDW RBC AUTO: 43.6 FL (ref 37–54)
EOSINOPHIL # BLD AUTO: 0.12 10*3/MM3 (ref 0–0.3)
EOSINOPHIL NFR BLD AUTO: 1.2 % (ref 0–3)
ERYTHROCYTE [DISTWIDTH] IN BLOOD BY AUTOMATED COUNT: 13.8 % (ref 11.3–14.5)
GFR SERPL CREATININE-BSD FRML MDRD: 121 ML/MIN/1.73
GLOBULIN UR ELPH-MCNC: 2.4 GM/DL
GLUCOSE BLD-MCNC: 87 MG/DL (ref 70–100)
GLUCOSE UR STRIP-MCNC: NEGATIVE MG/DL
HCT VFR BLD AUTO: 41.6 % (ref 34.5–44)
HGB BLD-MCNC: 13 G/DL (ref 11.5–15.5)
HGB UR QL STRIP.AUTO: NEGATIVE
HOLD SPECIMEN: NORMAL
HOLD SPECIMEN: NORMAL
HYALINE CASTS UR QL AUTO: NORMAL /LPF
IMM GRANULOCYTES # BLD: 0.02 10*3/MM3 (ref 0–0.03)
IMM GRANULOCYTES NFR BLD: 0.2 % (ref 0–0.6)
KETONES UR QL STRIP: ABNORMAL
LEUKOCYTE ESTERASE UR QL STRIP.AUTO: ABNORMAL
LIPASE SERPL-CCNC: 42 U/L (ref 6–51)
LYMPHOCYTES # BLD AUTO: 2.06 10*3/MM3 (ref 0.6–4.8)
LYMPHOCYTES NFR BLD AUTO: 19.9 % (ref 24–44)
MCH RBC QN AUTO: 27 PG (ref 27–31)
MCHC RBC AUTO-ENTMCNC: 31.3 G/DL (ref 32–36)
MCV RBC AUTO: 86.3 FL (ref 80–99)
MONOCYTES # BLD AUTO: 0.53 10*3/MM3 (ref 0–1)
MONOCYTES NFR BLD AUTO: 5.1 % (ref 0–12)
NEUTROPHILS # BLD AUTO: 7.6 10*3/MM3 (ref 1.5–8.3)
NEUTROPHILS NFR BLD AUTO: 73.6 % (ref 41–71)
NITRITE UR QL STRIP: NEGATIVE
PH UR STRIP.AUTO: 7 [PH] (ref 5–8)
PLATELET # BLD AUTO: 243 10*3/MM3 (ref 150–450)
PMV BLD AUTO: 9.9 FL (ref 6–12)
POTASSIUM BLD-SCNC: 3.6 MMOL/L (ref 3.5–5.5)
PROT SERPL-MCNC: 6.8 G/DL (ref 5.7–8.2)
PROT UR QL STRIP: NEGATIVE
RBC # BLD AUTO: 4.82 10*6/MM3 (ref 3.89–5.14)
RBC # UR: NORMAL /HPF
REF LAB TEST METHOD: NORMAL
SODIUM BLD-SCNC: 140 MMOL/L (ref 132–146)
SP GR UR STRIP: 1.01 (ref 1–1.03)
SQUAMOUS #/AREA URNS HPF: NORMAL /HPF
UROBILINOGEN UR QL STRIP: ABNORMAL
WBC NRBC COR # BLD: 10.33 10*3/MM3 (ref 3.5–10.8)
WBC UR QL AUTO: NORMAL /HPF
WHOLE BLOOD HOLD SPECIMEN: NORMAL
WHOLE BLOOD HOLD SPECIMEN: NORMAL

## 2018-11-24 PROCEDURE — 74176 CT ABD & PELVIS W/O CONTRAST: CPT

## 2018-11-24 PROCEDURE — 85025 COMPLETE CBC W/AUTO DIFF WBC: CPT | Performed by: EMERGENCY MEDICINE

## 2018-11-24 PROCEDURE — 81001 URINALYSIS AUTO W/SCOPE: CPT | Performed by: EMERGENCY MEDICINE

## 2018-11-24 PROCEDURE — 83690 ASSAY OF LIPASE: CPT | Performed by: EMERGENCY MEDICINE

## 2018-11-24 PROCEDURE — 99284 EMERGENCY DEPT VISIT MOD MDM: CPT

## 2018-11-24 PROCEDURE — 80053 COMPREHEN METABOLIC PANEL: CPT | Performed by: EMERGENCY MEDICINE

## 2018-11-24 RX ORDER — SODIUM CHLORIDE 0.9 % (FLUSH) 0.9 %
10 SYRINGE (ML) INJECTION AS NEEDED
Status: DISCONTINUED | OUTPATIENT
Start: 2018-11-24 | End: 2018-11-24 | Stop reason: HOSPADM

## 2018-11-24 RX ORDER — TRAMADOL HYDROCHLORIDE 50 MG/1
50 TABLET ORAL EVERY 6 HOURS PRN
Qty: 15 TABLET | Refills: 0 | Status: SHIPPED | OUTPATIENT
Start: 2018-11-24 | End: 2020-10-08

## 2018-11-24 NOTE — TELEPHONE ENCOUNTER
"Caller is concerned about left sided abdominal pain , unsure about radiation because has back pain, pain is worse when she bends over, rates 7-8/10, pain since yesterday. Denies pain or burning on urination, has hystrectomy 10/8  Reason for Disposition  • [1] SEVERE pain (e.g., excruciating) AND [2] present > 1 hour    Additional Information  • Negative: Shock suspected (e.g., cold/pale/clammy skin, too weak to stand, low BP, rapid pulse)  • Negative: Difficult to awaken or acting confused (e.g., disoriented, slurred speech)  • Negative: Passed out (i.e., lost consciousness, collapsed and was not responding)  • Negative: Sounds like a life-threatening emergency to the triager  • Negative: Chest pain  • Negative: Followed an abdomen (stomach) injury  • Pain is mainly in upper abdomen  (if needed ask: \"is it mainly above the belly button?\")  • Negative: Severe difficulty breathing (e.g., struggling for each breath, speaks in single words)  • Negative: Shock suspected (e.g., cold/pale/clammy skin, too weak to stand, low BP, rapid pulse)  • Negative: Difficult to awaken or acting confused (e.g., disoriented, slurred speech)  • Negative: Passed out (i.e., lost consciousness, collapsed and was not responding)  • Negative: Visible sweat on face or sweat dripping down face  • Negative: Sounds like a life-threatening emergency to the triager  • Negative: Followed an abdomen (stomach) injury  • Negative: Chest pain    Answer Assessment - Initial Assessment Questions  1. LOCATION: \"Where does it hurt?\"      Left sided  2. RADIATION: \"Does the pain shoot anywhere else?\" (e.g., chest, back)      none  3. ONSET: \"When did the pain begin?\" (e.g., minutes, hours or days ago)       Started  yesterday  4. SUDDEN: \"Gradual or sudden onset?\"       gradual  5. PATTERN \"Does the pain come and go, or is it constant?\"     - If constant: \"Is it getting better, staying the same, or worsening?\"       (Note: Constant means the pain never goes " "away completely; most serious pain is constant and it progresses)      - If intermittent: \"How long does it last?\" \"Do you have pain now?\"      (Note: Intermittent means the pain goes away completely between bouts)      Burning sensation and pain  6. SEVERITY: \"How bad is the pain?\"  (e.g., Scale 1-10; mild, moderate, or severe)    - MILD (1-3): doesn't interfere with normal activities, abdomen soft and not tender to touch     - MODERATE (4-7): interferes with normal activities or awakens from sleep, tender to touch     - SEVERE (8-10): excruciating pain, doubled over, unable to do any normal activities       Unable to do normal activity  7. RECURRENT SYMPTOM: \"Have you ever had this type of abdominal pain before?\" If so, ask: \"When was the last time?\" and \"What happened that time?\"       none  8. CAUSE: \"What do you think is causing the abdominal pain?\"      unsure  9. RELIEVING/AGGRAVATING FACTORS: \"What makes it better or worse?\" (e.g., movement, antacids, bowel movement)       Can not stand to bend  10. OTHER SYMPTOMS: \"Has there been any vomiting, diarrhea, constipation, or urine problems?\"        non3  11. PREGNANCY: \"Is there any chance you are pregnant?\" \"When was your last menstrual period?\"        na    Answer Assessment - Initial Assessment Questions  1. LOCATION: \"Where does it hurt?\"       Left sided  pain  2. RADIATION: \"Does the pain shoot anywhere else?\" (e.g., chest, back)      none  3. ONSET: \"When did the pain begin?\" (e.g., minutes, hours or days ago)       Yesterday became worse  4. SUDDEN: \"Gradual or sudden onset?\"      gradual  5. PATTERN \"Does the pain come and go, or is it constant?\"     - If constant: \"Is it getting better, staying the same, or worsening?\"       (Note: Constant means the pain never goes away completely; most serious pain is constant and it progresses)      - If intermittent: \"How long does it last?\" \"Do you have pain now?\"      (Note: Intermittent means the pain goes away " "completely between bouts)      Hurts to bend  6. SEVERITY: \"How bad is the pain?\"  (e.g., Scale 1-10; mild, moderate, or severe)     - MILD (1-3): doesn't interfere with normal activities, abdomen soft and not tender to touch      - MODERATE (4-7): interferes with normal activities or awakens from sleep, tender to touch      - SEVERE (8-10): excruciating pain, doubled over, unable to do any normal activities        7-8  7. RECURRENT SYMPTOM: \"Have you ever had this type of abdominal pain before?\" If so, ask: \"When was the last time?\" and \"What happened that time?\"       none  8. AGGRAVATING FACTORS: \"Does anything seem to cause this pain?\" (e.g., foods, stress, alcohol)       bending  9. CARDIAC SYMPTOMS: \"Do you have any of the following symptoms: chest pain, difficulty breathing, sweating, nausea?\"      no  10. OTHER SYMPTOMS: \"Do you have any other symptoms?\" (e.g., fever, vomiting, diarrhea)        Bowels moved today   11. PREGNANCY: \"Is there any chance you are pregnant?\" \"When was your last menstrual period?\"        na    Protocols used: ABDOMINAL PAIN - UPPER-ADULT-AH, ABDOMINAL PAIN - FEMALE-ADULT-AH      "

## 2018-11-25 NOTE — DISCHARGE INSTRUCTIONS
No strenuous activity, no heavy lifting.  Follow-up with your primary care provider for recheck in 2-3 days.  Return to the emergency department immediately if any change or worsening of her symptoms.

## 2018-11-25 NOTE — ED PROVIDER NOTES
"Subjective   72-year-old female presents to emergency department complaining of left-sided abdominal pain that started a few days ago.  She had a total abdominal hysterectomy on October 8, a benign mass was also excised.  She is followed by Dr. Jimenez.  She had been doing well, and resuming her regular daily activities.  She states she may have been \"overdoing it\" for the past few days.  No vomiting, no diarrhea melena or hematochezia.  No fevers chills or sweats.  No cough chest congestion sputum hemoptysis or LE swelling.  No history of DVT.  Her past medical history is remarkable for hypertension, and the above referenced pelvic mass.        Illness   Location:  Per HPI  Quality:  Per HPI  Severity:  Moderate  Onset quality:  Gradual  Duration:  3 days  Timing:  Intermittent  Progression:  Waxing and waning  Chronicity:  New  Context:  Per HPI  Relieved by:  Per HPI  Worsened by:  Per HPI  Ineffective treatments:  Per HPI  Associated symptoms: abdominal pain    Associated symptoms: no chest pain, no cough, no diarrhea, no fever, no headaches, no nausea, no rhinorrhea, no shortness of breath and no vomiting        Review of Systems   Constitutional: Negative for fever.   HENT: Negative for rhinorrhea.    Respiratory: Negative for cough and shortness of breath.    Cardiovascular: Negative for chest pain.   Gastrointestinal: Positive for abdominal pain. Negative for diarrhea, nausea and vomiting.   Neurological: Negative for headaches.       Past Medical History:   Diagnosis Date   • Cancer (CMS/HCC)    • Hypertension        No Known Allergies    Past Surgical History:   Procedure Laterality Date   • EXPLORATORY LAPAROTOMY, TOTAL ABDOMINAL HYSTERECTOMY WITH TUMOR DEBULKING N/A 10/8/2018    Procedure: EXPLORATORY LAPAROTOMY, TOTAL ABDOMINAL HYSTERECTOMY, BILATERAL SALPINGO-OOPHORECTOMY;  Surgeon: Deysi Watts MD;  Location: [facility];  Service: Gynecology   • MASTECTOMY     • REPLACEMENT TOTAL KNEE     • " TUBAL ABDOMINAL LIGATION         History reviewed. No pertinent family history.    Social History     Socioeconomic History   • Marital status:      Spouse name: Not on file   • Number of children: Not on file   • Years of education: Not on file   • Highest education level: Not on file   Tobacco Use   • Smoking status: Never Smoker   • Smokeless tobacco: Never Used   Substance and Sexual Activity   • Alcohol use: No   • Drug use: No   • Sexual activity: Defer           Objective   Physical Exam   Constitutional: She is oriented to person, place, and time. She appears well-developed and well-nourished. No distress.   HENT:   Head: Normocephalic and atraumatic.   Right Ear: External ear normal.   Left Ear: External ear normal.   Nose: Nose normal.   Mouth/Throat: Oropharynx is clear and moist. No oropharyngeal exudate.   Eyes: Conjunctivae and EOM are normal. Pupils are equal, round, and reactive to light. Right eye exhibits no discharge. Left eye exhibits no discharge. No scleral icterus.   Neck: Normal range of motion. Neck supple. No JVD present. No tracheal deviation present. No thyromegaly present.   Cardiovascular: Normal rate.   Pulmonary/Chest: Effort normal. No stridor. No respiratory distress.   Abdominal: Soft. Bowel sounds are normal. She exhibits no distension. There is no splenomegaly or hepatomegaly. There is tenderness in the left lower quadrant. There is no rigidity, no rebound, no guarding, no CVA tenderness, no tenderness at McBurney's point and negative Hsu's sign.   Musculoskeletal: Normal range of motion. She exhibits no edema, tenderness or deformity.   Neurological: She is alert and oriented to person, place, and time. No cranial nerve deficit. She exhibits normal muscle tone. Coordination normal.   Skin: Skin is warm and dry. No rash noted. She is not diaphoretic. No erythema. No pallor.   Psychiatric: She has a normal mood and affect. Her behavior is normal. Judgment and thought  content normal.   Nursing note and vitals reviewed.      Procedures           ED Course  ED Course as of Jan 23 2305   Sat Nov 24, 2018   1913 FINDINGS: Interval hysterectomy. Healed midline infraumbilical surgical  incision. The liver, gallbladder, kidneys, adrenal glands, pancreas and  spleen are unremarkable on unenhanced CT appearance. There is no dilated  small or large bowel. There is extensive colonic diverticulosis but no  evidence of acute diverticulitis. The appendix is normal. No free fluid  or enlarged lymph node. Extensive aortoiliac atherosclerosis. Clear lung  bases. No aggressive bone lesion. Advanced lumbar spine degenerative  change noted.  [TG]   2219 We'll discharge to home with pain control, close follow-up.  Patient's symptoms are likely due to over exertion, possibly adhesion development postoperatively.  She was advised to rest, no strenuous activity, maintain regular diet and regular activity otherwise.  Follow-up with her primary care provider on Monday for recheck and follow-up with Dr. Jimenez's office on Monday as well.  Return to emergency department immediately if any change or worsening of symptoms.  Patient and her daughter verbalize understanding and are agreeable to plan.  [TG]      ED Course User Index  [TG] Francisco Kruger PA-C                  [facility]      Final diagnoses:   Postoperative abdominal pain   Essential hypertension            Francisco Kruger PA-C  11/24/18 2220       Francisco Kruger PA-C  01/23/19 2305

## 2020-10-08 ENCOUNTER — HOSPITAL ENCOUNTER (OUTPATIENT)
Dept: GENERAL RADIOLOGY | Facility: HOSPITAL | Age: 75
Discharge: HOME | End: 2020-10-08

## 2020-10-08 ENCOUNTER — APPOINTMENT (OUTPATIENT)
Dept: PREADMISSION TESTING | Facility: HOSPITAL | Age: 75
End: 2020-10-08

## 2020-10-08 VITALS — BODY MASS INDEX: 28.38 KG/M2 | WEIGHT: 166.23 LBS | HEIGHT: 64 IN

## 2020-10-08 LAB
ALBUMIN SERPL-MCNC: 4.4 G/DL (ref 3.5–5.2)
ALBUMIN/GLOB SERPL: 1.8 G/DL
ALP SERPL-CCNC: 68 U/L (ref 39–117)
ALT SERPL W P-5'-P-CCNC: 12 U/L (ref 1–33)
ANION GAP SERPL CALCULATED.3IONS-SCNC: 8 MMOL/L (ref 5–15)
APTT PPP: 32.8 SECONDS (ref 24–37)
AST SERPL-CCNC: 16 U/L (ref 1–32)
BACTERIA UR QL AUTO: ABNORMAL /HPF
BASOPHILS # BLD MANUAL: 0.08 10*3/MM3 (ref 0–0.2)
BASOPHILS NFR BLD AUTO: 1 % (ref 0–1.5)
BILIRUB SERPL-MCNC: 0.5 MG/DL (ref 0–1.2)
BILIRUB UR QL STRIP: NEGATIVE
BUN SERPL-MCNC: 13 MG/DL (ref 8–23)
BUN/CREAT SERPL: 25 (ref 7–25)
CALCIUM SPEC-SCNC: 9.4 MG/DL (ref 8.6–10.5)
CHLORIDE SERPL-SCNC: 101 MMOL/L (ref 98–107)
CLARITY UR: CLEAR
CO2 SERPL-SCNC: 29 MMOL/L (ref 22–29)
COLOR UR: YELLOW
CREAT SERPL-MCNC: 0.52 MG/DL (ref 0.57–1)
DEPRECATED RDW RBC AUTO: 43.2 FL (ref 37–54)
EOSINOPHIL # BLD MANUAL: 0.08 10*3/MM3 (ref 0–0.4)
EOSINOPHIL NFR BLD MANUAL: 1 % (ref 0.3–6.2)
ERYTHROCYTE [DISTWIDTH] IN BLOOD BY AUTOMATED COUNT: 13.7 % (ref 12.3–15.4)
GFR SERPL CREATININE-BSD FRML MDRD: 115 ML/MIN/1.73
GLOBULIN UR ELPH-MCNC: 2.4 GM/DL
GLUCOSE SERPL-MCNC: 96 MG/DL (ref 65–99)
GLUCOSE UR STRIP-MCNC: NEGATIVE MG/DL
HBA1C MFR BLD: 6 % (ref 4.8–5.6)
HCT VFR BLD AUTO: 43.9 % (ref 34–46.6)
HGB BLD-MCNC: 13.4 G/DL (ref 12–15.9)
HGB UR QL STRIP.AUTO: NEGATIVE
HYALINE CASTS UR QL AUTO: ABNORMAL /LPF
INR PPP: 0.98 (ref 0.85–1.16)
KETONES UR QL STRIP: NEGATIVE
LEUKOCYTE ESTERASE UR QL STRIP.AUTO: ABNORMAL
LYMPHOCYTES # BLD MANUAL: 1.47 10*3/MM3 (ref 0.7–3.1)
LYMPHOCYTES NFR BLD MANUAL: 18 % (ref 19.6–45.3)
LYMPHOCYTES NFR BLD MANUAL: 3 % (ref 5–12)
MCH RBC QN AUTO: 26.2 PG (ref 26.6–33)
MCHC RBC AUTO-ENTMCNC: 30.5 G/DL (ref 31.5–35.7)
MCV RBC AUTO: 85.9 FL (ref 79–97)
MONOCYTES # BLD AUTO: 0.24 10*3/MM3 (ref 0.1–0.9)
MUCOUS THREADS URNS QL MICRO: ABNORMAL /HPF
NEUTROPHILS # BLD AUTO: 6.28 10*3/MM3 (ref 1.7–7)
NEUTROPHILS NFR BLD MANUAL: 77 % (ref 42.7–76)
NITRITE UR QL STRIP: NEGATIVE
PH UR STRIP.AUTO: 6 [PH] (ref 5–8)
PLAT MORPH BLD: NORMAL
PLATELET # BLD AUTO: 244 10*3/MM3 (ref 140–450)
PMV BLD AUTO: 9.1 FL (ref 6–12)
POTASSIUM SERPL-SCNC: 4.4 MMOL/L (ref 3.5–5.2)
PROT SERPL-MCNC: 6.8 G/DL (ref 6–8.5)
PROT UR QL STRIP: NEGATIVE
PROTHROMBIN TIME: 12.7 SECONDS (ref 11.5–14)
RBC # BLD AUTO: 5.11 10*6/MM3 (ref 3.77–5.28)
RBC # UR: ABNORMAL /HPF
RBC MORPH BLD: NORMAL
REF LAB TEST METHOD: ABNORMAL
SODIUM SERPL-SCNC: 138 MMOL/L (ref 136–145)
SP GR UR STRIP: 1.02 (ref 1–1.03)
SQUAMOUS #/AREA URNS HPF: ABNORMAL /HPF
UROBILINOGEN UR QL STRIP: ABNORMAL
WBC # BLD AUTO: 8.15 10*3/MM3 (ref 3.4–10.8)
WBC MORPH BLD: NORMAL
WBC UR QL AUTO: ABNORMAL /HPF
YEAST URNS QL MICRO: ABNORMAL /HPF

## 2020-10-08 PROCEDURE — 85610 PROTHROMBIN TIME: CPT | Performed by: ORTHOPAEDIC SURGERY

## 2020-10-08 PROCEDURE — 80053 COMPREHEN METABOLIC PANEL: CPT | Performed by: ORTHOPAEDIC SURGERY

## 2020-10-08 PROCEDURE — 93005 ELECTROCARDIOGRAM TRACING: CPT

## 2020-10-08 PROCEDURE — 36415 COLL VENOUS BLD VENIPUNCTURE: CPT

## 2020-10-08 PROCEDURE — 83036 HEMOGLOBIN GLYCOSYLATED A1C: CPT | Performed by: ORTHOPAEDIC SURGERY

## 2020-10-08 PROCEDURE — 85027 COMPLETE CBC AUTOMATED: CPT | Performed by: ORTHOPAEDIC SURGERY

## 2020-10-08 PROCEDURE — 85730 THROMBOPLASTIN TIME PARTIAL: CPT | Performed by: ORTHOPAEDIC SURGERY

## 2020-10-08 PROCEDURE — 85007 BL SMEAR W/DIFF WBC COUNT: CPT | Performed by: ORTHOPAEDIC SURGERY

## 2020-10-08 PROCEDURE — 71046 X-RAY EXAM CHEST 2 VIEWS: CPT

## 2020-10-08 PROCEDURE — 87086 URINE CULTURE/COLONY COUNT: CPT | Performed by: ORTHOPAEDIC SURGERY

## 2020-10-08 PROCEDURE — 93010 ELECTROCARDIOGRAM REPORT: CPT | Performed by: INTERNAL MEDICINE

## 2020-10-08 PROCEDURE — 81001 URINALYSIS AUTO W/SCOPE: CPT | Performed by: ORTHOPAEDIC SURGERY

## 2020-10-08 RX ORDER — SENNA PLUS 8.6 MG/1
1 TABLET ORAL DAILY
COMMUNITY

## 2020-10-08 RX ORDER — ASPIRIN 81 MG/1
81 TABLET ORAL DAILY
COMMUNITY

## 2020-10-09 LAB — BACTERIA SPEC AEROBE CULT: NO GROWTH

## 2020-10-20 ENCOUNTER — APPOINTMENT (OUTPATIENT)
Dept: PREADMISSION TESTING | Facility: HOSPITAL | Age: 75
End: 2020-10-20

## 2020-10-20 PROCEDURE — C9803 HOPD COVID-19 SPEC COLLECT: HCPCS

## 2020-10-20 PROCEDURE — U0004 COV-19 TEST NON-CDC HGH THRU: HCPCS

## 2020-10-21 ENCOUNTER — ANESTHESIA EVENT (OUTPATIENT)
Dept: PERIOP | Facility: HOSPITAL | Age: 75
End: 2020-10-21

## 2020-10-21 LAB — SARS-COV-2 RNA RESP QL NAA+PROBE: NOT DETECTED

## 2020-10-22 ENCOUNTER — ANESTHESIA (OUTPATIENT)
Dept: PERIOP | Facility: HOSPITAL | Age: 75
End: 2020-10-22

## 2020-10-22 ENCOUNTER — HOSPITAL ENCOUNTER (INPATIENT)
Facility: HOSPITAL | Age: 75
LOS: 1 days | Discharge: HOME | End: 2020-10-23
Attending: ORTHOPAEDIC SURGERY | Admitting: ORTHOPAEDIC SURGERY

## 2020-10-22 ENCOUNTER — APPOINTMENT (OUTPATIENT)
Dept: GENERAL RADIOLOGY | Facility: HOSPITAL | Age: 75
End: 2020-10-22

## 2020-10-22 DIAGNOSIS — Z96.611 STATUS POST TOTAL SHOULDER ARTHROPLASTY, RIGHT: Primary | ICD-10-CM

## 2020-10-22 PROBLEM — M19.011 GLENOHUMERAL ARTHRITIS, RIGHT: Status: ACTIVE | Noted: 2020-10-22

## 2020-10-22 PROBLEM — R73.09 ELEVATED HEMOGLOBIN A1C: Status: ACTIVE | Noted: 2020-10-22

## 2020-10-22 PROCEDURE — 25010000002 PROPOFOL 10 MG/ML EMULSION: Performed by: NURSE ANESTHETIST, CERTIFIED REGISTERED

## 2020-10-22 PROCEDURE — 25010000003 CEFAZOLIN IN DEXTROSE 2-4 GM/100ML-% SOLUTION: Performed by: ORTHOPAEDIC SURGERY

## 2020-10-22 PROCEDURE — 97110 THERAPEUTIC EXERCISES: CPT

## 2020-10-22 PROCEDURE — 25010000003 LIDOCAINE 1 % SOLUTION: Performed by: NURSE ANESTHETIST, CERTIFIED REGISTERED

## 2020-10-22 PROCEDURE — 25010000002 ONDANSETRON PER 1 MG: Performed by: NURSE ANESTHETIST, CERTIFIED REGISTERED

## 2020-10-22 PROCEDURE — 97535 SELF CARE MNGMENT TRAINING: CPT

## 2020-10-22 PROCEDURE — 0RRJ0JZ REPLACEMENT OF RIGHT SHOULDER JOINT WITH SYNTHETIC SUBSTITUTE, OPEN APPROACH: ICD-10-PCS | Performed by: ORTHOPAEDIC SURGERY

## 2020-10-22 PROCEDURE — 97165 OT EVAL LOW COMPLEX 30 MIN: CPT

## 2020-10-22 PROCEDURE — 25010000002 VANCOMYCIN 1 G RECONSTITUTED SOLUTION: Performed by: ORTHOPAEDIC SURGERY

## 2020-10-22 PROCEDURE — 25010000002 DEXAMETHASONE PER 1 MG: Performed by: NURSE ANESTHETIST, CERTIFIED REGISTERED

## 2020-10-22 PROCEDURE — 73020 X-RAY EXAM OF SHOULDER: CPT

## 2020-10-22 PROCEDURE — 25010000002 ROPIVACAINE PER 1 MG: Performed by: NURSE ANESTHETIST, CERTIFIED REGISTERED

## 2020-10-22 PROCEDURE — 25010000002 PHENYLEPHRINE PER 1 ML: Performed by: NURSE ANESTHETIST, CERTIFIED REGISTERED

## 2020-10-22 PROCEDURE — 25010000002 NEOSTIGMINE 10 MG/10ML SOLUTION: Performed by: NURSE ANESTHETIST, CERTIFIED REGISTERED

## 2020-10-22 PROCEDURE — C1713 ANCHOR/SCREW BN/BN,TIS/BN: HCPCS | Performed by: ORTHOPAEDIC SURGERY

## 2020-10-22 PROCEDURE — C1776 JOINT DEVICE (IMPLANTABLE): HCPCS | Performed by: ORTHOPAEDIC SURGERY

## 2020-10-22 DEVICE — ABSORBABLE HEMOSTAT (OXIDIZED REGENERATED CELLULOSE, U.S.P.)
Type: IMPLANTABLE DEVICE | Site: SHOULDER | Status: FUNCTIONAL
Brand: SURGICEL

## 2020-10-22 DEVICE — USP II HUMERAL HEAD 44/17
Type: IMPLANTABLE DEVICE | Site: SHOULDER | Status: FUNCTIONAL
Brand: ARTHREX®

## 2020-10-22 DEVICE — KT SUT UNIVERS APEX NO2FW: Type: IMPLANTABLE DEVICE | Site: SHOULDER | Status: FUNCTIONAL

## 2020-10-22 DEVICE — UNIVERS VAULTLOCK GLENOID, SMALL
Type: IMPLANTABLE DEVICE | Site: SHOULDER | Status: FUNCTIONAL
Brand: ARTHREX®

## 2020-10-22 DEVICE — CMT BONE SIMPLEX/P FULL DOSE 10/PK: Type: IMPLANTABLE DEVICE | Site: SHOULDER | Status: FUNCTIONAL

## 2020-10-22 DEVICE — TOTL ARTH SHLDR S3: Type: IMPLANTABLE DEVICE | Site: SHOULDER | Status: FUNCTIONAL

## 2020-10-22 DEVICE — UNIVERS APEX HUMERAL STEM, 6MM
Type: IMPLANTABLE DEVICE | Site: SHOULDER | Status: FUNCTIONAL
Brand: ARTHREX®

## 2020-10-22 RX ORDER — GLYCOPYRROLATE 0.2 MG/ML
INJECTION INTRAMUSCULAR; INTRAVENOUS AS NEEDED
Status: DISCONTINUED | OUTPATIENT
Start: 2020-10-22 | End: 2020-10-22 | Stop reason: SURG

## 2020-10-22 RX ORDER — CEFAZOLIN SODIUM 2 G/100ML
2 INJECTION, SOLUTION INTRAVENOUS EVERY 8 HOURS
Status: COMPLETED | OUTPATIENT
Start: 2020-10-22 | End: 2020-10-22

## 2020-10-22 RX ORDER — NEOSTIGMINE METHYLSULFATE 1 MG/ML
INJECTION, SOLUTION INTRAVENOUS AS NEEDED
Status: DISCONTINUED | OUTPATIENT
Start: 2020-10-22 | End: 2020-10-22 | Stop reason: SURG

## 2020-10-22 RX ORDER — ONDANSETRON 2 MG/ML
INJECTION INTRAMUSCULAR; INTRAVENOUS AS NEEDED
Status: DISCONTINUED | OUTPATIENT
Start: 2020-10-22 | End: 2020-10-22 | Stop reason: SURG

## 2020-10-22 RX ORDER — FENTANYL CITRATE 50 UG/ML
50 INJECTION, SOLUTION INTRAMUSCULAR; INTRAVENOUS
Status: DISCONTINUED | OUTPATIENT
Start: 2020-10-22 | End: 2020-10-22 | Stop reason: HOSPADM

## 2020-10-22 RX ORDER — CEFAZOLIN SODIUM 2 G/100ML
2 INJECTION, SOLUTION INTRAVENOUS ONCE
Status: COMPLETED | OUTPATIENT
Start: 2020-10-22 | End: 2020-10-22

## 2020-10-22 RX ORDER — OXYCODONE HYDROCHLORIDE 5 MG/1
5 TABLET ORAL EVERY 4 HOURS PRN
Qty: 40 TABLET | Refills: 0 | Status: SHIPPED | OUTPATIENT
Start: 2020-10-22 | End: 2021-02-15

## 2020-10-22 RX ORDER — DEXAMETHASONE SODIUM PHOSPHATE 4 MG/ML
INJECTION, SOLUTION INTRA-ARTICULAR; INTRALESIONAL; INTRAMUSCULAR; INTRAVENOUS; SOFT TISSUE AS NEEDED
Status: DISCONTINUED | OUTPATIENT
Start: 2020-10-22 | End: 2020-10-22 | Stop reason: SURG

## 2020-10-22 RX ORDER — MAGNESIUM HYDROXIDE 1200 MG/15ML
LIQUID ORAL AS NEEDED
Status: DISCONTINUED | OUTPATIENT
Start: 2020-10-22 | End: 2020-10-22 | Stop reason: HOSPADM

## 2020-10-22 RX ORDER — OXYCODONE HYDROCHLORIDE 5 MG/1
10 TABLET ORAL EVERY 4 HOURS PRN
Status: DISCONTINUED | OUTPATIENT
Start: 2020-10-22 | End: 2020-10-23 | Stop reason: HOSPADM

## 2020-10-22 RX ORDER — LIDOCAINE HYDROCHLORIDE 10 MG/ML
INJECTION, SOLUTION INFILTRATION; PERINEURAL AS NEEDED
Status: DISCONTINUED | OUTPATIENT
Start: 2020-10-22 | End: 2020-10-22 | Stop reason: SURG

## 2020-10-22 RX ORDER — LIDOCAINE HYDROCHLORIDE 10 MG/ML
0.5 INJECTION, SOLUTION EPIDURAL; INFILTRATION; INTRACAUDAL; PERINEURAL ONCE AS NEEDED
Status: COMPLETED | OUTPATIENT
Start: 2020-10-22 | End: 2020-10-22

## 2020-10-22 RX ORDER — SENNA PLUS 8.6 MG/1
1 TABLET ORAL DAILY
Status: DISCONTINUED | OUTPATIENT
Start: 2020-10-22 | End: 2020-10-23 | Stop reason: HOSPADM

## 2020-10-22 RX ORDER — PROMETHAZINE HYDROCHLORIDE 25 MG/1
25 SUPPOSITORY RECTAL ONCE AS NEEDED
Status: DISCONTINUED | OUTPATIENT
Start: 2020-10-22 | End: 2020-10-22 | Stop reason: HOSPADM

## 2020-10-22 RX ORDER — ACETAMINOPHEN 500 MG
1000 TABLET ORAL ONCE
Status: COMPLETED | OUTPATIENT
Start: 2020-10-22 | End: 2020-10-22

## 2020-10-22 RX ORDER — SODIUM CHLORIDE, SODIUM LACTATE, POTASSIUM CHLORIDE, CALCIUM CHLORIDE 600; 310; 30; 20 MG/100ML; MG/100ML; MG/100ML; MG/100ML
9 INJECTION, SOLUTION INTRAVENOUS CONTINUOUS
Status: DISCONTINUED | OUTPATIENT
Start: 2020-10-22 | End: 2020-10-22

## 2020-10-22 RX ORDER — ACETAMINOPHEN 650 MG/1
650 SUPPOSITORY RECTAL EVERY 4 HOURS PRN
Status: DISCONTINUED | OUTPATIENT
Start: 2020-10-22 | End: 2020-10-23 | Stop reason: HOSPADM

## 2020-10-22 RX ORDER — OXYCODONE HYDROCHLORIDE 5 MG/1
5 TABLET ORAL EVERY 4 HOURS PRN
Status: DISCONTINUED | OUTPATIENT
Start: 2020-10-22 | End: 2020-10-23 | Stop reason: HOSPADM

## 2020-10-22 RX ORDER — SODIUM CHLORIDE 450 MG/100ML
50 INJECTION, SOLUTION INTRAVENOUS CONTINUOUS
Status: DISCONTINUED | OUTPATIENT
Start: 2020-10-22 | End: 2020-10-23 | Stop reason: HOSPADM

## 2020-10-22 RX ORDER — SODIUM CHLORIDE 0.9 % (FLUSH) 0.9 %
10 SYRINGE (ML) INJECTION AS NEEDED
Status: DISCONTINUED | OUTPATIENT
Start: 2020-10-22 | End: 2020-10-22

## 2020-10-22 RX ORDER — SODIUM CHLORIDE 0.9 % (FLUSH) 0.9 %
10 SYRINGE (ML) INJECTION EVERY 12 HOURS SCHEDULED
Status: DISCONTINUED | OUTPATIENT
Start: 2020-10-22 | End: 2020-10-22

## 2020-10-22 RX ORDER — VANCOMYCIN HYDROCHLORIDE 1 G/20ML
INJECTION, POWDER, LYOPHILIZED, FOR SOLUTION INTRAVENOUS AS NEEDED
Status: DISCONTINUED | OUTPATIENT
Start: 2020-10-22 | End: 2020-10-22 | Stop reason: HOSPADM

## 2020-10-22 RX ORDER — LABETALOL HYDROCHLORIDE 5 MG/ML
10 INJECTION, SOLUTION INTRAVENOUS EVERY 4 HOURS PRN
Status: DISCONTINUED | OUTPATIENT
Start: 2020-10-22 | End: 2020-10-23 | Stop reason: HOSPADM

## 2020-10-22 RX ORDER — HYDROMORPHONE HYDROCHLORIDE 1 MG/ML
0.5 INJECTION, SOLUTION INTRAMUSCULAR; INTRAVENOUS; SUBCUTANEOUS
Status: DISCONTINUED | OUTPATIENT
Start: 2020-10-22 | End: 2020-10-23 | Stop reason: HOSPADM

## 2020-10-22 RX ORDER — PREGABALIN 75 MG/1
75 CAPSULE ORAL ONCE
Status: COMPLETED | OUTPATIENT
Start: 2020-10-22 | End: 2020-10-22

## 2020-10-22 RX ORDER — ONDANSETRON 2 MG/ML
4 INJECTION INTRAMUSCULAR; INTRAVENOUS EVERY 6 HOURS PRN
Status: DISCONTINUED | OUTPATIENT
Start: 2020-10-22 | End: 2020-10-22 | Stop reason: SDUPTHER

## 2020-10-22 RX ORDER — FAMOTIDINE 10 MG/ML
20 INJECTION, SOLUTION INTRAVENOUS ONCE
Status: DISCONTINUED | OUTPATIENT
Start: 2020-10-22 | End: 2020-10-22

## 2020-10-22 RX ORDER — NALOXONE HCL 0.4 MG/ML
0.1 VIAL (ML) INJECTION
Status: DISCONTINUED | OUTPATIENT
Start: 2020-10-22 | End: 2020-10-23 | Stop reason: HOSPADM

## 2020-10-22 RX ORDER — FAMOTIDINE 20 MG/1
20 TABLET, FILM COATED ORAL ONCE
Status: COMPLETED | OUTPATIENT
Start: 2020-10-22 | End: 2020-10-22

## 2020-10-22 RX ORDER — POLYETHYLENE GLYCOL 3350 17 G/17G
17 POWDER, FOR SOLUTION ORAL ONCE
Status: DISCONTINUED | OUTPATIENT
Start: 2020-10-22 | End: 2020-10-23 | Stop reason: HOSPADM

## 2020-10-22 RX ORDER — PROMETHAZINE HYDROCHLORIDE 25 MG/1
25 TABLET ORAL ONCE AS NEEDED
Status: DISCONTINUED | OUTPATIENT
Start: 2020-10-22 | End: 2020-10-22 | Stop reason: HOSPADM

## 2020-10-22 RX ORDER — BUPIVACAINE HYDROCHLORIDE 2.5 MG/ML
INJECTION, SOLUTION EPIDURAL; INFILTRATION; INTRACAUDAL
Status: COMPLETED | OUTPATIENT
Start: 2020-10-22 | End: 2020-10-22

## 2020-10-22 RX ORDER — HYDROMORPHONE HYDROCHLORIDE 1 MG/ML
0.5 INJECTION, SOLUTION INTRAMUSCULAR; INTRAVENOUS; SUBCUTANEOUS
Status: DISCONTINUED | OUTPATIENT
Start: 2020-10-22 | End: 2020-10-22 | Stop reason: HOSPADM

## 2020-10-22 RX ORDER — ROCURONIUM BROMIDE 10 MG/ML
INJECTION, SOLUTION INTRAVENOUS AS NEEDED
Status: DISCONTINUED | OUTPATIENT
Start: 2020-10-22 | End: 2020-10-22 | Stop reason: SURG

## 2020-10-22 RX ORDER — ONDANSETRON 4 MG/1
4 TABLET, FILM COATED ORAL EVERY 6 HOURS PRN
Status: DISCONTINUED | OUTPATIENT
Start: 2020-10-22 | End: 2020-10-23 | Stop reason: HOSPADM

## 2020-10-22 RX ORDER — ONDANSETRON 2 MG/ML
4 INJECTION INTRAMUSCULAR; INTRAVENOUS EVERY 6 HOURS PRN
Status: DISCONTINUED | OUTPATIENT
Start: 2020-10-22 | End: 2020-10-23 | Stop reason: HOSPADM

## 2020-10-22 RX ORDER — PROPOFOL 10 MG/ML
VIAL (ML) INTRAVENOUS AS NEEDED
Status: DISCONTINUED | OUTPATIENT
Start: 2020-10-22 | End: 2020-10-22 | Stop reason: SURG

## 2020-10-22 RX ORDER — ASPIRIN 81 MG/1
81 TABLET ORAL DAILY
Status: DISCONTINUED | OUTPATIENT
Start: 2020-10-23 | End: 2020-10-23 | Stop reason: HOSPADM

## 2020-10-22 RX ORDER — ACETAMINOPHEN 325 MG/1
650 TABLET ORAL EVERY 4 HOURS PRN
Status: DISCONTINUED | OUTPATIENT
Start: 2020-10-22 | End: 2020-10-23 | Stop reason: HOSPADM

## 2020-10-22 RX ADMIN — FAMOTIDINE 20 MG: 20 TABLET, FILM COATED ORAL at 06:54

## 2020-10-22 RX ADMIN — ROCURONIUM BROMIDE 40 MG: 10 INJECTION INTRAVENOUS at 07:34

## 2020-10-22 RX ADMIN — EPHEDRINE SULFATE 5 MG: 50 INJECTION INTRAMUSCULAR; INTRAVENOUS; SUBCUTANEOUS at 07:48

## 2020-10-22 RX ADMIN — EPHEDRINE SULFATE 5 MG: 50 INJECTION INTRAMUSCULAR; INTRAVENOUS; SUBCUTANEOUS at 07:46

## 2020-10-22 RX ADMIN — NEOSTIGMINE 4 MG: 1 INJECTION INTRAVENOUS at 09:14

## 2020-10-22 RX ADMIN — BENZOCAINE AND MENTHOL 1 LOZENGE: 15; 3.6 LOZENGE ORAL at 15:51

## 2020-10-22 RX ADMIN — SODIUM CHLORIDE, POTASSIUM CHLORIDE, SODIUM LACTATE AND CALCIUM CHLORIDE 9 ML/HR: 600; 310; 30; 20 INJECTION, SOLUTION INTRAVENOUS at 06:52

## 2020-10-22 RX ADMIN — TRANEXAMIC ACID 1000 MG: 100 INJECTION, SOLUTION INTRAVENOUS at 07:38

## 2020-10-22 RX ADMIN — EPHEDRINE SULFATE 10 MG: 50 INJECTION INTRAMUSCULAR; INTRAVENOUS; SUBCUTANEOUS at 08:34

## 2020-10-22 RX ADMIN — PREGABALIN 75 MG: 75 CAPSULE ORAL at 06:54

## 2020-10-22 RX ADMIN — PHENYLEPHRINE HYDROCHLORIDE 80 MCG: 10 INJECTION INTRAVENOUS at 07:49

## 2020-10-22 RX ADMIN — GLYCOPYRROLATE 0.4 MG: 0.2 INJECTION INTRAMUSCULAR; INTRAVENOUS at 09:14

## 2020-10-22 RX ADMIN — PROPOFOL 150 MG: 10 INJECTION, EMULSION INTRAVENOUS at 07:34

## 2020-10-22 RX ADMIN — PHENYLEPHRINE HYDROCHLORIDE 80 MCG: 10 INJECTION INTRAVENOUS at 08:34

## 2020-10-22 RX ADMIN — CEFAZOLIN SODIUM 2 G: 2 INJECTION, SOLUTION INTRAVENOUS at 07:28

## 2020-10-22 RX ADMIN — CEFAZOLIN SODIUM 2 G: 2 INJECTION, SOLUTION INTRAVENOUS at 15:59

## 2020-10-22 RX ADMIN — BUPIVACAINE HYDROCHLORIDE 15 ML: 2.5 INJECTION, SOLUTION EPIDURAL; INFILTRATION; INTRACAUDAL; PERINEURAL at 07:20

## 2020-10-22 RX ADMIN — ROPIVACAINE HYDROCHLORIDE 6 ML/HR: 5 INJECTION, SOLUTION EPIDURAL; INFILTRATION; PERINEURAL at 09:20

## 2020-10-22 RX ADMIN — PHENYLEPHRINE HYDROCHLORIDE 80 MCG: 10 INJECTION INTRAVENOUS at 07:47

## 2020-10-22 RX ADMIN — DEXAMETHASONE SODIUM PHOSPHATE 8 MG: 4 INJECTION, SOLUTION INTRAMUSCULAR; INTRAVENOUS at 07:37

## 2020-10-22 RX ADMIN — ACETAMINOPHEN 650 MG: 325 TABLET, FILM COATED ORAL at 15:51

## 2020-10-22 RX ADMIN — CEFAZOLIN SODIUM 2 G: 2 INJECTION, SOLUTION INTRAVENOUS at 22:44

## 2020-10-22 RX ADMIN — TRANEXAMIC ACID 1000 MG: 100 INJECTION, SOLUTION INTRAVENOUS at 08:56

## 2020-10-22 RX ADMIN — EPHEDRINE SULFATE 5 MG: 50 INJECTION INTRAMUSCULAR; INTRAVENOUS; SUBCUTANEOUS at 07:50

## 2020-10-22 RX ADMIN — LIDOCAINE HYDROCHLORIDE 50 MG: 10 INJECTION, SOLUTION INFILTRATION; PERINEURAL at 07:34

## 2020-10-22 RX ADMIN — ACETAMINOPHEN 1000 MG: 500 TABLET ORAL at 06:54

## 2020-10-22 RX ADMIN — ONDANSETRON 4 MG: 2 INJECTION INTRAMUSCULAR; INTRAVENOUS at 09:01

## 2020-10-22 RX ADMIN — LIDOCAINE HYDROCHLORIDE 0.5 ML: 10 INJECTION, SOLUTION EPIDURAL; INFILTRATION; INTRACAUDAL; PERINEURAL at 06:52

## 2020-10-22 RX ADMIN — PHENYLEPHRINE HYDROCHLORIDE 80 MCG: 10 INJECTION INTRAVENOUS at 09:01

## 2020-10-22 RX ADMIN — SENNOSIDES 1 TABLET: 8.6 TABLET, FILM COATED ORAL at 15:52

## 2020-10-22 NOTE — PLAN OF CARE
Goal Outcome Evaluation:  Plan of Care Reviewed With: patient  Progress: improving  Pt. Has had minimal pain since coming up from surgery, tylenol given. VSS, on room air. Has voided 500mL. Worked with OT and is up in the chair. Will continue to monitor.

## 2020-10-22 NOTE — PLAN OF CARE
Problem: Adult Inpatient Plan of Care  Goal: Plan of Care Review  Recent Flowsheet Documentation  Taken 10/22/2020 1544 by Vianey Sharp OT  Progress: improving  Plan of Care Reviewed With:   patient   daughter  Outcome Summary: OT eval complete. OT educated pt and daughter on sling management as well as wear and care, shoulder precautions, axilla care, deejay dressing, and care of nerve catheter during ADLS. Pt maxA for all sling management this date. Pt completes sit to stand with CGA and ambulates with Jaki and no AE. Pt did not pass mobility screen and will need PT eval. Pt plans to d/c home with assist from family.

## 2020-10-22 NOTE — H&P
Patient Name: Issa Farmer  MRN: [id number]  : 1945  DOS: 10/22/2020    Attending: Anderson Muñiz MD    Primary Care Provider: Yan Unger DO      Chief complaint: Right shoulder pain    Subjective   Patient is a pleasant 74 y.o. female presented for scheduled surgery by Dr. Muñiz.  She underwent right total shoulder arthroplasty under general anesthesia.  She tolerated surgery well and was admitted for further medical management.  Her shoulder has been painful with limited range of motion for about 4 years.  She denies use of assistive device for ambulation.  She does report a fall back in September at home, without loss of consciousness.    When seen in PACU she is doing well.  Her pain is well controlled.  She does complain of sore throat.  She denies nausea, shortness of breath or chest pain.  No history of DVT or PE.    (Above is noted, agree.  Doing well once in her room afterwards.  Good pain control.  No nausea vomiting or shortness of breath.  Ambulated with OT.  Sats dropped to 84 with ambulation recovered quickly.  Looks comfortable sitting in her chair.)wy    Allergies:  No Known Allergies    Meds:  Medications Prior to Admission   Medication Sig Dispense Refill Last Dose   • benzoyl peroxide 5 % external liquid Apply  topically to the appropriate area as directed. 148 g 0 10/22/2020 at 0400   • docusate sodium 100 MG capsule Take 200 mg by mouth 2 (Two) Times a Day. 60 each 1 10/21/2020 at 2100   • losartan (COZAAR) 50 MG tablet Take 50 mg by mouth Daily.   10/21/2020 at 2100   • senna (senna) 8.6 MG tablet Take 1 tablet by mouth Daily.   10/21/2020 at 0800   • acetaminophen (TYLENOL) 325 MG tablet Take 2 tablets by mouth Every 6 (Six) Hours. 60 tablet 0 10/20/2020   • aspirin 81 MG EC tablet Take 81 mg by mouth Daily.   10/18/2020   • Cranberry 250 MG tablet Take 1 tablet by mouth Daily.   10/20/2020   • ibuprofen (ADVIL,MOTRIN) 600 MG tablet Take 1 tablet by  "mouth Every 6 (Six) Hours As Needed for Mild Pain . 30 tablet 0 10/15/2020   • ondansetron (ZOFRAN) 4 MG tablet Take 1 tablet by mouth Every 8 (Eight) Hours As Needed for post-op nausea. 30 tablet 0 Unknown at Unknown time   • polyethylene glycol (MIRALAX) powder Take 17 g by mouth Daily. 578 g 0 More than a month at Unknown time         History:   Past Medical History:   Diagnosis Date   • Cancer (CMS/HCC)     Left breast cancer 1996   • Hypertension    • Shoulder pain     right     Past Surgical History:   Procedure Laterality Date   • EXPLORATORY LAPAROTOMY, TOTAL ABDOMINAL HYSTERECTOMY WITH TUMOR DEBULKING N/A 10/8/2018    Procedure: EXPLORATORY LAPAROTOMY, TOTAL ABDOMINAL HYSTERECTOMY, BILATERAL SALPINGO-OOPHORECTOMY;  Surgeon: Deysi Watts MD;  Location: [facility];  Service: Gynecology   • HYSTERECTOMY     • MASTECTOMY Left    • REPLACEMENT TOTAL KNEE Left    • TUBAL ABDOMINAL LIGATION       History reviewed. No pertinent family history.  Social History     Tobacco Use   • Smoking status: Never Smoker   • Smokeless tobacco: Never Used   Substance Use Topics   • Alcohol use: No   • Drug use: No   She lives with her granddaughter.  She has 4 children.  She has a grocery store.    Review of Systems  All systems were reviewed and negative except for:  Gastrointestinal: positive for  constipation    Vital Signs  /85 (BP Location: Left arm, Patient Position: Lying)   Pulse 112   Temp 98.5 °F (36.9 °C)   Resp 16   Ht 162.6 cm (64\")   Wt 75.3 kg (166 lb)   SpO2 91%   BMI 28.49 kg/m²     Physical Exam:    General Appearance:    Alert, cooperative, in no acute distress   Head:    Normocephalic, without obvious abnormality, atraumatic   Eyes:            Lids and lashes normal, conjunctivae and sclerae normal, no   icterus, no pallor, corneas clear,    Ears:    Ears appear intact with no abnormalities noted   Throat:   No oral lesions, no thrush, oral mucosa moist   Neck:   No adenopathy, supple, " trachea midline, no thyromegaly    Lungs:     Clear to auscultation,respirations regular, even and unlabored    Heart:    Regular rhythm and normal rate, normal S1 and S2, no murmur, no gallop   Abdomen:     Normal bowel sounds, no masses, no organomegaly, soft non-tender, non-distended, no guarding, no rebound  tenderness   Genitalia:    Deferred   Extremities:  RUE sling, nerve block present.  Aquacel clean dry intact.  Good movement and cap refill right digits.  Some numbness right hand due to nerve block   Pulses:   Pulses palpable and equal bilaterally   Skin:   No bleeding, bruising or rash   Neurologic:   Cranial nerves 2 - 12 grossly intact.         I reviewed the patient's new clinical results.       Lab Results   Component Value Date    HGBA1C 6.00 (H) 10/08/2020     Results for TESSA FONTANEZ (MRN [id number]) as of 10/22/2020 12:59   Ref. Range 10/8/2020 14:09   Glucose Latest Ref Range: 65 - 99 mg/dL 96   Sodium Latest Ref Range: 136 - 145 mmol/L 138   Potassium Latest Ref Range: 3.5 - 5.2 mmol/L 4.4   CO2 Latest Ref Range: 22.0 - 29.0 mmol/L 29.0   Chloride Latest Ref Range: 98 - 107 mmol/L 101   Anion Gap Latest Ref Range: 5.0 - 15.0 mmol/L 8.0   Creatinine Latest Ref Range: 0.57 - 1.00 mg/dL 0.52 (L)   BUN Latest Ref Range: 8 - 23 mg/dL 13   BUN/Creatinine Ratio Latest Ref Range: 7.0 - 25.0  25.0   Calcium Latest Ref Range: 8.6 - 10.5 mg/dL 9.4   eGFR Non  Am Latest Ref Range: >60 mL/min/1.73 115   Alkaline Phosphatase Latest Ref Range: 39 - 117 U/L 68   Total Protein Latest Ref Range: 6.0 - 8.5 g/dL 6.8   ALT (SGPT) Latest Ref Range: 1 - 33 U/L 12   AST (SGOT) Latest Ref Range: 1 - 32 U/L 16   Total Bilirubin Latest Ref Range: 0.0 - 1.2 mg/dL 0.5   Albumin Latest Ref Range: 3.50 - 5.20 g/dL 4.40   Globulin Latest Units: gm/dL 2.4   A/G Ratio Latest Units: g/dL 1.8   Hemoglobin A1C Latest Ref Range: 4.80 - 5.60 % 6.00 (H)   Protime Latest Ref Range: 11.5 - 14.0 Seconds 12.7   INR Latest  Ref Range: 0.85 - 1.16  0.98   PTT Latest Ref Range: 24.0 - 37.0 seconds 32.8   WBC Latest Ref Range: 3.40 - 10.80 10*3/mm3 8.15   RBC Latest Ref Range: 3.77 - 5.28 10*6/mm3 5.11   Hemoglobin Latest Ref Range: 12.0 - 15.9 g/dL 13.4   Hematocrit Latest Ref Range: 34.0 - 46.6 % 43.9   RDW Latest Ref Range: 12.3 - 15.4 % 13.7   MCV Latest Ref Range: 79.0 - 97.0 fL 85.9   MCH Latest Ref Range: 26.6 - 33.0 pg 26.2 (L)   MCHC Latest Ref Range: 31.5 - 35.7 g/dL 30.5 (L)   MPV Latest Ref Range: 6.0 - 12.0 fL 9.1   Platelets Latest Ref Range: 140 - 450 10*3/mm3 244       Assessment and Plan:     Status post total shoulder arthroplasty, right    Glenohumeral arthritis, right    HTN (hypertension)    Elevated hemoglobin A1c      Plan  1. PT/OT-SHANA Jennings  2. Pain control-prns, interscalene block   3. IS-encourage  4. DVT proph- Mechs/ASA  5. Bowel regimen  6. Resume home medications per Dr. Muñiz  7. Monitor post-op labs  8. DC planning for home    HTN  -Hold Cozaar  - Monitor BP   - Labetalol PRN for SBP>170    Elevated hemoglobin A1c: In prediabetic range  - Explained to patient implication of A1C elevation, need to modify diet and increase activity, and f/u with PCP.      TRICIA Cuellar  10/22/20  14:49 EDT     I have personally performed the evaluation on this patient. My history is consistent  with HPI obtained. My exam findings are listed above. I have personally reviewed and discussed the above formulated treatment plan with pt , daughter, and . [email or web address].

## 2020-10-22 NOTE — THERAPY EVALUATION
Patient Name: Issa Farmer  : 1945    MRN: [id number]                              Today's Date: 10/22/2020       Admit Date: 10/22/2020    Visit Dx:     ICD-10-CM ICD-9-CM   1. Status post total shoulder arthroplasty, right  Z96.611 V43.61     Patient Active Problem List   Diagnosis   • HTN (hypertension)   • Glenohumeral arthritis, right   • Status post total shoulder arthroplasty, right   • Elevated hemoglobin A1c     Past Medical History:   Diagnosis Date   • Cancer (CMS/HCC)     Left breast cancer    • Hypertension    • Shoulder pain     right     Past Surgical History:   Procedure Laterality Date   • EXPLORATORY LAPAROTOMY, TOTAL ABDOMINAL HYSTERECTOMY WITH TUMOR DEBULKING N/A 10/8/2018    Procedure: EXPLORATORY LAPAROTOMY, TOTAL ABDOMINAL HYSTERECTOMY, BILATERAL SALPINGO-OOPHORECTOMY;  Surgeon: Deysi Watts MD;  Location: [facility];  Service: Gynecology   • HYSTERECTOMY     • MASTECTOMY Left    • REPLACEMENT TOTAL KNEE Left    • TUBAL ABDOMINAL LIGATION       General Information     Row Name 10/22/20 1535          OT Time and Intention    Document Type  evaluation  -HK     Mode of Treatment  occupational therapy  -HK     Row Name 10/22/20 1535          General Information    Patient Profile Reviewed  yes  -HK     Prior Level of Function  independent:;all household mobility;community mobility;gait;transfer;bed mobility;min assist:;ADL's  -HK     Existing Precautions/Restrictions  fall;non-weight bearing;right;shoulder NWB R UE; R UE in sling; R interscalene nerve catheter.  -HK     Barriers to Rehab  none identified  -HK     Row Name 10/22/20 1535          Living Environment    Lives With  grandchild(bere)  -HK     Row Name 10/22/20 1535          Home Main Entrance    Number of Stairs, Main Entrance  one  -HK     Stair Railings, Main Entrance  none  -HK     Row Name 10/22/20 1535          Stairs Within Home, Primary    Number of Stairs, Within Home, Primary  none  -HK     Stair  Railings, Within Home, Primary  none  -HK     Stairs Comment, Within Home, Primary  Pt reports walk in shower and assist from multiple family members.  -     Row Name 10/22/20 1535          Cognition    Orientation Status (Cognition)  oriented x 4  -     Row Name 10/22/20 1535          Safety Issues, Functional Mobility    Safety Issues Affecting Function (Mobility)  safety precaution awareness;safety precautions follow-through/compliance;insight into deficits/self-awareness  -     Impairments Affecting Function (Mobility)  balance;pain;strength;range of motion (ROM);endurance/activity tolerance  -       User Key  (r) = Recorded By, (t) = Taken By, (c) = Cosigned By    Initials Name Provider Type    HK Vianey Sharp, OT Occupational Therapist        Mobility/ADL's     Row Name 10/22/20 1537          Bed Mobility    Bed Mobility  scooting/bridging;supine-sit  -HK     Scooting/Bridging [place] (Bed Mobility)  minimum assist (75% patient effort);verbal cues  -     Supine-Sit [place] (Bed Mobility)  minimum assist (75% patient effort);verbal cues  -     Bed Mobility, Safety Issues  decreased use of arms for pushing/pulling;impaired trunk control for bed mobility  -     Assistive Device (Bed Mobility)  bed rails;head of bed elevated  -     Comment (Bed Mobility)  Pt advanced to EOB with Jaki from OT. OT reviewed completion of all bed mobility while maintaining shoulder precautions.  -     Row Name 10/22/20 1537          Transfers    Transfers  sit-stand transfer  -     Comment (Transfers)  Verbal cues for safety and attention to R UE.  -     Sit-Stand [place] (Transfers)  contact guard;verbal cues  -     Row Name 10/22/20 1537          Sit-Stand Transfer    Assistive Device (Sit-Stand Transfers)  walker, front-wheeled  -     Row Name 10/22/20 1537          Functional Mobility    Functional Mobility- Ind. Level  verbal cues required;minimum assist (75% patient effort)  -      Functional Mobility-Distance (Feet)  300  -HK     Functional Mobility- Safety Issues  step length decreased;weight-shifting ability decreased  -     Functional Mobility- Comment  Pt began mobility with CGA however with fatigue requiring more assist. Pt did not pass mobility screen and will need PT order.  -     Row Name 10/22/20 1537          Activities of Daily Living    BADL Assessment/Intervention  upper body dressing;bathing  -     Row Name 10/22/20 1537          Upper Body Dressing Assessment/Training    [place] Level (Upper Body Dressing)  doff;don;other (see comments);maximum assist (25% patient effort);verbal cues sling  -HK     Position (Upper Body Dressing)  supported sitting  -HK     Comment (Upper Body Dressing)  OT educated pt and daughter on all sling management as well as wear and care, shoulder precautions, axilla care, deejay dressing, and care of nerve catheter during ADLS. Pt maxA at this time for sling management.  -     Row Name 10/22/20 1537          Bathing Assessment/Intervention    Comment (Bathing)  Pt and daughter educated on completion of R axilla care while maintaining shoulder precautions.  -       User Key  (r) = Recorded By, (t) = Taken By, (c) = Cosigned By    Initials Name Provider Type     Vianey Sharp, OT Occupational Therapist        Obj/Interventions     Row Name 10/22/20 1544          Sensory Assessment (Somatosensory)    Sensory Assessment (Somatosensory)  right UE  -     Right UE Sensory Assessment  general sensation;light touch awareness  -     Sensory Subjective Reports  insensate  -     Row Name 10/22/20 1544          Vision Assessment/Intervention    Visual Impairment/Limitations  corrective lenses full-time  -     Row Name 10/22/20 154          Balance    Balance Assessment  sitting static balance;sitting dynamic balance;standing static balance;standing dynamic balance  -     Static Sitting Balance  WNL  -     Dynamic Sitting Balance  WFL   -HK     Static Standing Balance  WFL  -HK     Dynamic Standing Balance  mild impairment  -HK       User Key  (r) = Recorded By, (t) = Taken By, (c) = Cosigned By    Initials Name Provider Type    HK Vianey Sharp OT Occupational Therapist        Goals/Plan     Row Name 10/22/20 1551          Bed Mobility Goal 1 (OT)    Activity/Assistive Device (Bed Mobility Goal 1, OT)  sit to supine/supine to sit;scooting  -HK     [place] Level/Cues Needed (Bed Mobility Goal 1, OT)  contact guard assist;verbal cues required  -HK     Time Frame (Bed Mobility Goal 1, OT)  by discharge  -HK     Progress/Outcomes (Bed Mobility Goal 1, OT)  goal ongoing  -HK     Row Name 10/22/20 1551          Transfer Goal 1 (OT)    Activity/Assistive Device (Transfer Goal 1, OT)  sit-to-stand/stand-to-sit;toilet  -HK     [place] Level/Cues Needed (Transfer Goal 1, OT)  verbal cues required;contact guard assist  -HK     Time Frame (Transfer Goal 1, OT)  by discharge  -HK     Progress/Outcome (Transfer Goal 1, OT)  goal ongoing  -HK     Row Name 10/22/20 1551          Dressing Goal 1 (OT)    Activity/Device (Dressing Goal 1, OT)  upper body dressing Daughter will be able to dof/don sling  -HK     [place]/Cues Needed (Dressing Goal 1, OT)  supervision required;verbal cues required  -HK     Time Frame (Dressing Goal 1, OT)  by discharge  -HK     Progress/Outcome (Dressing Goal 1, OT)  goal ongoing  -HK     Row Name 10/22/20 1551          ROM Goal 1 (OT)    ROM Goal 1 (OT)  Pt and daughter will adequately demonstrate R UE HEP per surgeons preference.  -HK     Time Frame (ROM Goal 1, OT)  by discharge  -HK     Progress/Outcome (ROM Goal 1, OT)  goal ongoing  -HK       User Key  (r) = Recorded By, (t) = Taken By, (c) = Cosigned By    Initials Name Provider Type     Vianey Sharp OT Occupational Therapist        Clinical Impression     Row Name 10/22/20 4712          Pain Assessment    Additional Documentation  Pain Scale: Numbers  Pre/Post-Treatment (Group)  -     Row Name 10/22/20 1544          Pain Scale: Numbers Pre/Post-Treatment    Pretreatment Pain Rating  0/10 - no pain  -     Posttreatment Pain Rating  0/10 - no pain  -     Row Name 10/22/20 1544          Plan of Care Review    Plan of Care Reviewed With  patient;daughter  -     Progress  improving  -     Outcome Summary  OT eval complete. OT educated pt and daughter on sling management as well as wear and care, shoulder precautions, axilla care, deejay dressing, and care of nerve catheter during ADLS. Pt maxA for all sling management this date. Pt completes sit to stand with CGA and ambulates with Jaki and no AE. Pt did not pass mobility screen and will need PT eval. Pt plans to d/c home with assist from family.  -     Row Name 10/22/20 1544          Therapy Assessment/Plan (OT)    Patient/Family Therapy Goal Statement (OT)  Pt would like to improve and return home.  -     Rehab Potential (OT)  good, to achieve stated therapy goals  -     Criteria for Skilled Therapeutic Interventions Met (OT)  yes;skilled treatment is necessary  -     Therapy Frequency (OT)  daily  -     Row Name 10/22/20 1544          Therapy Plan Review/Discharge Plan (OT)    Anticipated Discharge Disposition (OT)  home with assist  -     Row Name 10/22/20 1544          Vital Signs    Pre Systolic BP Rehab  144  -HK     Pre Treatment Diastolic BP  80  -HK     Post Systolic BP Rehab  156  -HK     Post Treatment Diastolic BP  95  -HK     Pre SpO2 (%)  92  -HK     O2 Delivery Pre Treatment  room air  -HK     Intra SpO2 (%)  84  -HK     O2 Delivery Intra Treatment  room air  -HK     Post SpO2 (%)  94  -HK     O2 Delivery Post Treatment  room air  -HK     Pre Patient Position  Supine  -HK     Intra Patient Position  Standing  -HK     Post Patient Position  Sitting  -     Row Name 10/22/20 1544          Positioning and Restraints    Pre-Treatment Position  in bed  -HK     Post Treatment Position   chair  -HK     In Chair  notified nsg;reclined;call light within reach;encouraged to call for assist;exit alarm on;with family/caregiver  -HK       User Key  (r) = Recorded By, (t) = Taken By, (c) = Cosigned By    Initials Name Provider Type    Vianey Cruz OT Occupational Therapist        Outcome Measures     Row Name 10/22/20 [facility]2          How much help from another is currently needed...    Putting on and taking off regular lower body clothing?  2  -HK     Bathing (including washing, rinsing, and drying)  2  -HK     Toileting (which includes using toilet bed pan or urinal)  3  -HK     Putting on and taking off regular upper body clothing  2  -HK     Taking care of personal grooming (such as brushing teeth)  3  -HK     Eating meals  3  -HK     AM-PAC 6 Clicks Score (OT)  15  -HK     Row Name 10/22/20 [facility]          Functional Assessment    Outcome Measure Options  AM-PAC 6 Clicks Daily Activity (OT)  -HK       User Key  (r) = Recorded By, (t) = Taken By, (c) = Cosigned By    Initials Name Provider Type    Vianey Cruz OT Occupational Therapist        Occupational Therapy Education                 Title: PT OT SLP Therapies (Done)     Topic: Occupational Therapy (Done)     Point: ADL training (Done)     Description:   Instruct learner(s) on proper safety adaptation and remediation techniques during self care or transfers.   Instruct in proper use of assistive devices.              Learning Progress Summary           Patient Acceptance, E,TB,D,H, VU,NR by  at 10/22/2020 1552                   Point: Home exercise program (Done)     Description:   Instruct learner(s) on appropriate technique for monitoring, assisting and/or progressing therapeutic exercises/activities.              Learning Progress Summary           Patient Acceptance, E,TB,D,H, VU,NR by  at 10/22/2020 1552                   Point: Precautions (Done)     Description:   Instruct learner(s) on prescribed precautions during self-care and  functional transfers.              Learning Progress Summary           Patient Acceptance, E,TB,D,H, VU,NR by  at 10/22/2020 1552                   Point: Body mechanics (Done)     Description:   Instruct learner(s) on proper positioning and spine alignment during self-care, functional mobility activities and/or exercises.              Learning Progress Summary           Patient Acceptance, E,TB,D,H, VU,NR by  at 10/22/2020 1552                               User Key     Initials Effective Dates Name Provider Type [facility] 03/07/18 -  Vianey Sharp OT Occupational Therapist OT              OT Recommendation and Plan  Therapy Frequency (OT): daily  Plan of Care Review  Plan of Care Reviewed With: patient, daughter  Progress: improving  Outcome Summary: OT eval complete. OT educated pt and daughter on sling management as well as wear and care, shoulder precautions, axilla care, deejay dressing, and care of nerve catheter during ADLS. Pt maxA for all sling management this date. Pt completes sit to stand with CGA and ambulates with Jaki and no AE. Pt did not pass mobility screen and will need PT eval. Pt plans to d/c home with assist from family.     Time Calculation:   Time Calculation- OT     Row Name 10/22/20 1447             Time Calculation- OT    OT Start Time  1447  -      OT Received On  10/22/20  -      OT Goal Re-Cert Due Date  11/01/20  -         Timed Charges    98148 - OT Therapeutic Exercise Minutes  15  -      68289 - OT Self Care/Mgmt Minutes  10  -        User Key  (r) = Recorded By, (t) = Taken By, (c) = Cosigned By    Initials Name Provider Type     Vianey Sharp OT Occupational Therapist        Therapy Charges for Today     Code Description Service Date Service Provider Modifiers Qty    78603670477  OT THER PROC EA 15 MIN 10/22/2020 Vianey Sharp OT GO 1    [id number] HC OT SELF CARE/MGMT/TRAIN EA 15 MIN 10/22/2020 Vianey Sharp OT GO 1    [id number]  OT EVAL LOW  COMPLEXITY 3 10/22/2020 Vianey Sharp, OT GO 1               Vianey Sharp, OT  10/22/2020

## 2020-10-22 NOTE — ANESTHESIA PREPROCEDURE EVALUATION
Anesthesia Evaluation     Patient summary reviewed and Nursing notes reviewed   NPO Solid Status: > 8 hours  NPO Liquid Status: > 8 hours           Airway   Mallampati: II  TM distance: >3 FB  Neck ROM: full  No difficulty expected  Dental - normal exam     Pulmonary     breath sounds clear to auscultation  Cardiovascular   Exercise tolerance: good (4-7 METS)    Rhythm: regular  Rate: normal        Neuro/Psych  GI/Hepatic/Renal/Endo      Musculoskeletal     Abdominal    Substance History      OB/GYN          Other                      Anesthesia Plan    ASA 2     general     intravenous induction     Anesthetic plan, all risks, benefits, and alternatives have been provided, discussed and informed consent has been obtained with: patient.    ISB for post op pain discussed and agreed  I/V and bpp cuff  on left side  Despite mastectomy

## 2020-10-22 NOTE — PERIOPERATIVE NURSING NOTE
Spoke with Dr. Mcintosh regarding IV placement.  History of Mastectomy in 1996 in left arm.  Surgery on right shoulder today.  After interviewing patient Dr. Mcintosh stated to place IV in left arm for surgery.

## 2020-10-22 NOTE — ANESTHESIA PROCEDURE NOTES
Interscalene Catheter      Patient reassessed immediately prior to procedure    Patient location during procedure: pre-op  Reason for block: at surgeon's request and post-op pain management  Performed by  CRNA: Shelly Arriola CRNA  Assisted by: Esperanza Mast CRNA  Preanesthetic Checklist  Completed: patient identified, site marked, surgical consent, pre-op evaluation, timeout performed, IV checked, risks and benefits discussed and monitors and equipment checked  Prep:  Pt Position: left lateral decubitus  Sterile barriers:cap, gloves, mask and sterile barriers  Prep: ChloraPrep  Patient monitoring: blood pressure monitoring, continuous pulse oximetry and EKG  Procedure  Sedation:yes  Performed under: local infiltration  Guidance:ultrasound guided  Images:still images obtained, printed/placed on chart    Laterality:right  Block Type:interscalene  Injection Technique:catheter  Needle Type:Tuohy and echogenic  Needle Gauge:18 G  Resistance on Injection: none  Catheter Size:20 G (20g)  Cath Depth at skin: 10 cm    Medications Used: bupivacaine PF (MARCAINE) 0.25 % injection, 15 mL  Med admintered at 10/22/2020 7:20 AM      Post Assessment  Injection Assessment: negative aspiration for heme, no paresthesia on injection and incremental injection  Patient Tolerance:comfortable throughout block  Complications:no  Additional Notes  Procedure:                 The pt was placed in semifowlers position with a slight tilt of the thorax contralateral to the insertion site.  The Insertion Site was prepped and draped in sterile fashion.  The pt was anesthetized with  IV Sedation( see meds) and  Skin and cutaneous tissue was infiltrated and anesthetized with 1% Lidocaine 3 mls via a 25g needle.  Utilizing ultrasound guidance, a BBraun 2 inch 18 g Contiplex echogenic touhy needle was advanced in-plane.  Hydro dissection of tissue was achieved with Normal saline. Major vessels(carotid and Internal Jugular) where visualized as  the brachial plexus was approached at the approximate level of C-7/ T-1.  Cervical 5 and Branches of Cervical 6 nerve roots where visualized and the needle tip was placed posterior at the level of C-6 roots.  LA spread was visualized and injection was made incrementally every 5 mls with aspiration. Injection pressure was normal or little, there was no intraneural injection, no vascular injection.      The BBraun 20 g wire stylet  catheter was then placed under US guidance on the posterior aspect of the Brachial Plexus.  Location of catheter was confirmed with NS injection visualized with US . The tuohy was then removed and the skin was sealed with Skin AFix at catheter insertion site.  Skin was prepped with mastisol and the labeled catheter  was secured with steristrips and a CHG tegaderm. Thank You.

## 2020-10-22 NOTE — H&P
Patient Care Team:      Chief complaint  Right shoulder pain    Subjective:    Patient is a 74 y.o.female presents with a history of right shoulder pain for about four years.  Both shoulders hurt, right worse than left  Review of Systems:  General ROS: negative  Cardiovascular ROS: no chest pain or dyspnea on exertion  Respiratory ROS: no cough, shortness of breath, or wheezing      Allergies: No Known Allergies       Latex: neg  Contrast Dye neg    Home Meds    Medications Prior to Admission   Medication Sig Dispense Refill Last Dose   • benzoyl peroxide 5 % external liquid Apply  topically to the appropriate area as directed. 148 g 0 10/22/2020 at 0400   • docusate sodium 100 MG capsule Take 200 mg by mouth 2 (Two) Times a Day. 60 each 1 10/21/2020 at 2100   • losartan (COZAAR) 50 MG tablet Take 50 mg by mouth Daily.   10/21/2020 at 2100   • senna (senna) 8.6 MG tablet Take 1 tablet by mouth Daily.   10/21/2020 at 0800   • acetaminophen (TYLENOL) 325 MG tablet Take 2 tablets by mouth Every 6 (Six) Hours. 60 tablet 0 10/20/2020   • aspirin 81 MG EC tablet Take 81 mg by mouth Daily.   10/18/2020   • Cranberry 250 MG tablet Take 1 tablet by mouth Daily.   10/20/2020   • ibuprofen (ADVIL,MOTRIN) 600 MG tablet Take 1 tablet by mouth Every 6 (Six) Hours As Needed for Mild Pain . 30 tablet 0 10/15/2020   • ondansetron (ZOFRAN) 4 MG tablet Take 1 tablet by mouth Every 8 (Eight) Hours As Needed for post-op nausea. 30 tablet 0 Unknown at Unknown time   • polyethylene glycol (MIRALAX) powder Take 17 g by mouth Daily. 578 g 0 More than a month at Unknown time     PMH:   Past Medical History:   Diagnosis Date   • Cancer (CMS/HCC)     Left breast cancer 1996   • Hypertension    • Shoulder pain     right     PSH:    Past Surgical History:   Procedure Laterality Date   • EXPLORATORY LAPAROTOMY, TOTAL ABDOMINAL HYSTERECTOMY WITH TUMOR DEBULKING N/A 10/8/2018    Procedure: EXPLORATORY LAPAROTOMY, TOTAL ABDOMINAL HYSTERECTOMY,  "BILATERAL SALPINGO-OOPHORECTOMY;  Surgeon: Deysi Watts MD;  Location: [facility];  Service: Gynecology   • HYSTERECTOMY     • MASTECTOMY Left    • REPLACEMENT TOTAL KNEE Left    • TUBAL ABDOMINAL LIGATION       Immunization History: pneumo up to date   Flu 2020  Tetanus ?  Social History:   Tobacco neg   Alcohol neg      Physical Exam:/76 (BP Location: Right arm, Patient Position: Lying)   Pulse 80   Temp 98.2 °F (36.8 °C) (Temporal)   Resp 16   Ht 162.6 cm (64\")   Wt 75.3 kg (166 lb)   SpO2 96%   BMI 28.49 kg/m²       General Appearance:    Alert, cooperative, no distress, appears stated age   Head:    Normocephalic, without obvious abnormality, atraumatic   Lungs:     Clear to auscultation bilaterally, respirations unlabored    Heart: Regular rate and rhythm, S1 and S2 normal, no murmur, rub    or gallop    Abdomen:    Soft without tenderness   Breast Exam:    deferred   Genitalia:    deferred   Extremities:   Extremities normal, atraumatic, no cyanosis or edema   Skin:   Skin color, texture, turgor normal, no rashes or lesions   Neurologic:   Grossly intact     Results Review:   LABS:  Lab Results   Component Value Date    WBC 8.15 10/08/2020    HGB 13.4 10/08/2020    HCT 43.9 10/08/2020    MCV 85.9 10/08/2020     10/08/2020    NEUTROABS 6.28 10/08/2020    GLUCOSE 96 10/08/2020    BUN 13 10/08/2020    CREATININE 0.52 (L) 10/08/2020    EGFRIFNONA 115 10/08/2020     10/08/2020    K 4.4 10/08/2020     10/08/2020    CO2 29.0 10/08/2020    CALCIUM 9.4 10/08/2020    ALBUMIN 4.40 10/08/2020    AST 16 10/08/2020    ALT 12 10/08/2020    BILITOT 0.5 10/08/2020       RADIOLOGY:  Imaging Results (Last 72 Hours)     ** No results found for the last 72 hours. **               Impression: osteoarthritis right shoulder  Plan:  Right total shoulder arthroplasty versus reverse arthroplasty  Sury Nelson PA-C 10/22/2020 07:04 EDT        "

## 2020-10-22 NOTE — OP NOTE
Operative Report Total Shoulder Replacement    Preoperative Diagnosis: right  shoulder degenerative arthritis    Postoperative Diagnosis: Same    Procedure: right Total shoulder arthroplasty    Surgeon: Dr. Anderson Muñiz    Assistant: Ysabel Hendricks PA-C    ** Please note the physician assistant was medically necessary to assist with positioning retraction, arm positioning, care of soft tissues and closure    EBL:    150 cc    Anesthesia: GETA with interscalene brachial plexus block    Implants:  Arthrex Total Shoulder Arthroplasty System  1) Humeral Stem:  Size 6  2) Glenoid:  Sizesmall  , cemented, pegged all poly  3) Head:  Offset humeral head, size 44  mm x  17 mm       Indications: Patient   is a 75yo  female with right  shoulder degenerative arthritis.  Risks and benefits of operative versus nonoperative management discussed.  Patient elected to proceed with surgery. Informed consent obtained.       Description: Patient was met in the preoperative holding area and confirmed by name, medical record number, .  The operative site was correctly identified. Patient was then met by anesthesia and cleared for surgery.  An interscalene brachial plexus block was then performed.  Patient was then brought to the operating room suite and and placed supine on the OR table.  Patient underwent smooth induction with GETA.  Patient then positioned in the beach chair position. Patient’s right shoulder and right upper extremity prepped and draped in sterile fashion. Preoperative prophylactic antibiotic given with 2 g of Ancef.  A timeout was then observed    An approx 8cm skin incision was made centered over the deltopectoral interval.  Dissection carried down to the interval taking the cephalic vein laterally.  Deep retractors were then placed. The biceps tendon was then localized and followed through the rotator interval back to the glenoid and tenotomized.  A subscapularis peel was performed and the shoulder was readily  dislocated.      The proximal humerus was cut in a free hand fashion in approximately 30 degrees of retroversion and the appropriate depth of resection and inclination. We then reamed the humerus distally to accommodate a humeral stem trial. We then used the countersink reamed and then placed an appropriate humeral stem trial.  A humeral head trial was placed and rotated to the appropriate position.        The axillary nerve was then palpated and deep retractors were placed exposing the glenoid. Careful releases of the anterior and inferior capsule were then performed further exposing the glenoid.  The  glenoid was then exposed, the glenoid was reamed appropriately and the  peg holes drilled.  Cement was then mixed, and placed in a pressurized fashion into the peg holes.  An all poly glenoid implant was applied and impacted into position.       We drill holes in the biceps groove to aid in repair of our subscapularis peel    The shoulder was then redislocated and a size 6 humeral stem was placed.  An offset humeral head was applied and impacted into position.      We then passed our stitches through the subscapularis and tied down our tendon.      We then irrigated with sterile saline.  We then closed the deltopectoral layer with 0-vicryl, the dermal layer with 2-0 vicryl and the skin with interrupted 3-0 nylon.  Vancomycin powder was placed in the wound.  A sterile dressing was applied.      Patient tolerated the procedure well.  They were extubated and placed in a sling.  At the end of the case all sponge and instrument counts were correct x 2.      Plan: Patient will be admitted for observation and pain control.  Antibiotics x 24 hours.   Patient will be seen back in the clinic in approx 10-14 days.

## 2020-10-22 NOTE — ANESTHESIA POSTPROCEDURE EVALUATION
Patient: Issa Farmer    Procedure Summary     Date: 10/22/20 [facility] / [facility]:  [facility]  [facility]    Anesthesia Start: 0728 Anesthesia Stop: 0948    Procedure: RIGHT TOTAL SHOULDER ARTHROPASTY VS. RIGHT REVERE TOTAL SHOULDER ARTHROPLASTY (Right Shoulder) Diagnosis:       Glenohumeral arthritis, right      (glenohumeral arthritis right)    Surgeon: Anderson Muñiz MD Provider: Saul Mcintosh MD    Anesthesia Type: general ASA Status: 2          Anesthesia Type: general    Vitals  Vitals Value Taken Time   /72 10/22/20 0945   Temp     Pulse 87 10/22/20 0947   Resp     SpO2 95 % 10/22/20 0947   Vitals shown include unvalidated device data.        Post Anesthesia Care and Evaluation    Patient location during evaluation: PACU  Patient participation: complete - patient participated  Level of consciousness: awake and alert  Pain score: 0  Pain management: adequate  Airway patency: patent  Anesthetic complications: No anesthetic complications  PONV Status: none  Cardiovascular status: hemodynamically stable and acceptable  Respiratory status: nonlabored ventilation, acceptable and nasal cannula  Hydration status: acceptable

## 2020-10-22 NOTE — ANESTHESIA PROCEDURE NOTES
Airway  Urgency: elective    Date/Time: 10/22/2020 7:35 AM  Airway not difficult    General Information and Staff    Patient location during procedure: OR  CRNA: Shelly Arriola CRNA    Indications and Patient Condition  Indications for airway management: airway protection    Preoxygenated: yes  MILS not maintained throughout  Mask difficulty assessment: 1 - vent by mask    Final Airway Details  Final airway type: endotracheal airway      Successful airway: ETT  Cuffed: yes   Successful intubation technique: direct laryngoscopy  Endotracheal tube insertion site: oral  Blade: Kenia  Blade size: 3  ETT size (mm): 7.0  Cormack-Lehane Classification: grade I - full view of glottis  Placement verified by: chest auscultation and capnometry   Cuff volume (mL): 5  Measured from: lips  ETT/EBT  to lips (cm): 20  Number of attempts at approach: 1  Assessment: lips, teeth, and gum same as pre-op and atraumatic intubation    Additional Comments  Negative epigastric sounds, Breath sound equal bilaterally with symmetric chest rise and fall

## 2020-10-23 VITALS
SYSTOLIC BLOOD PRESSURE: 145 MMHG | DIASTOLIC BLOOD PRESSURE: 86 MMHG | OXYGEN SATURATION: 96 % | WEIGHT: 166 LBS | RESPIRATION RATE: 16 BRPM | HEIGHT: 64 IN | HEART RATE: 83 BPM | BODY MASS INDEX: 28.34 KG/M2 | TEMPERATURE: 98.4 F

## 2020-10-23 PROBLEM — G89.18 ACUTE POSTOPERATIVE PAIN: Status: ACTIVE | Noted: 2020-10-23

## 2020-10-23 PROBLEM — D72.829 LEUKOCYTOSIS: Status: ACTIVE | Noted: 2020-10-23

## 2020-10-23 LAB
ANION GAP SERPL CALCULATED.3IONS-SCNC: 9 MMOL/L (ref 5–15)
BASOPHILS # BLD AUTO: 0.02 10*3/MM3 (ref 0–0.2)
BASOPHILS NFR BLD AUTO: 0.2 % (ref 0–1.5)
BUN SERPL-MCNC: 10 MG/DL (ref 8–23)
BUN/CREAT SERPL: 14.7 (ref 7–25)
CALCIUM SPEC-SCNC: 9.3 MG/DL (ref 8.6–10.5)
CHLORIDE SERPL-SCNC: 99 MMOL/L (ref 98–107)
CO2 SERPL-SCNC: 28 MMOL/L (ref 22–29)
CREAT SERPL-MCNC: 0.68 MG/DL (ref 0.57–1)
DEPRECATED RDW RBC AUTO: 46.5 FL (ref 37–54)
EOSINOPHIL # BLD AUTO: 0.02 10*3/MM3 (ref 0–0.4)
EOSINOPHIL NFR BLD AUTO: 0.2 % (ref 0.3–6.2)
ERYTHROCYTE [DISTWIDTH] IN BLOOD BY AUTOMATED COUNT: 14.1 % (ref 12.3–15.4)
GFR SERPL CREATININE-BSD FRML MDRD: 84 ML/MIN/1.73
GLUCOSE SERPL-MCNC: 92 MG/DL (ref 65–99)
HCT VFR BLD AUTO: 41.7 % (ref 34–46.6)
HGB BLD-MCNC: 12.6 G/DL (ref 12–15.9)
IMM GRANULOCYTES # BLD AUTO: 0.06 10*3/MM3 (ref 0–0.05)
IMM GRANULOCYTES NFR BLD AUTO: 0.5 % (ref 0–0.5)
LYMPHOCYTES # BLD AUTO: 1.29 10*3/MM3 (ref 0.7–3.1)
LYMPHOCYTES NFR BLD AUTO: 11.5 % (ref 19.6–45.3)
MCH RBC QN AUTO: 27.3 PG (ref 26.6–33)
MCHC RBC AUTO-ENTMCNC: 30.2 G/DL (ref 31.5–35.7)
MCV RBC AUTO: 90.5 FL (ref 79–97)
MONOCYTES # BLD AUTO: 1.01 10*3/MM3 (ref 0.1–0.9)
MONOCYTES NFR BLD AUTO: 9 % (ref 5–12)
NEUTROPHILS NFR BLD AUTO: 78.6 % (ref 42.7–76)
NEUTROPHILS NFR BLD AUTO: 8.77 10*3/MM3 (ref 1.7–7)
NRBC BLD AUTO-RTO: 0 /100 WBC (ref 0–0.2)
PLATELET # BLD AUTO: 221 10*3/MM3 (ref 140–450)
PMV BLD AUTO: 9.5 FL (ref 6–12)
POTASSIUM SERPL-SCNC: 4.1 MMOL/L (ref 3.5–5.2)
RBC # BLD AUTO: 4.61 10*6/MM3 (ref 3.77–5.28)
SODIUM SERPL-SCNC: 136 MMOL/L (ref 136–145)
WBC # BLD AUTO: 11.17 10*3/MM3 (ref 3.4–10.8)

## 2020-10-23 PROCEDURE — 97110 THERAPEUTIC EXERCISES: CPT

## 2020-10-23 PROCEDURE — 97535 SELF CARE MNGMENT TRAINING: CPT

## 2020-10-23 PROCEDURE — 97162 PT EVAL MOD COMPLEX 30 MIN: CPT

## 2020-10-23 PROCEDURE — 85025 COMPLETE CBC W/AUTO DIFF WBC: CPT | Performed by: ORTHOPAEDIC SURGERY

## 2020-10-23 PROCEDURE — 80048 BASIC METABOLIC PNL TOTAL CA: CPT | Performed by: NURSE PRACTITIONER

## 2020-10-23 RX ADMIN — OXYCODONE 5 MG: 5 TABLET ORAL at 13:28

## 2020-10-23 RX ADMIN — OXYCODONE 5 MG: 5 TABLET ORAL at 00:52

## 2020-10-23 RX ADMIN — OXYCODONE 5 MG: 5 TABLET ORAL at 06:50

## 2020-10-23 RX ADMIN — SENNOSIDES 1 TABLET: 8.6 TABLET, FILM COATED ORAL at 08:15

## 2020-10-23 RX ADMIN — ASPIRIN 81 MG: 81 TABLET, COATED ORAL at 08:15

## 2020-10-23 NOTE — PLAN OF CARE
Goal Outcome Evaluation:  Plan of Care Reviewed With: patient  Progress: improving  Outcome Summary: DC'd home

## 2020-10-23 NOTE — PLAN OF CARE
Problem: Adult Inpatient Plan of Care  Goal: Plan of Care Review  Recent Flowsheet Documentation  Taken 10/23/2020 1050 by Glenny Harrell, PT  Progress: improving  Plan of Care Reviewed With:   patient   daughter  Outcome Summary: Patient demonstrarted safe ambulation with cane in hallway. Her SATs did drop to 84% on room air  at end of 350 foot walk. Will breathing cues, they return to 92 % within a minute. SHe is going home with her daughter and has been issued a cane.

## 2020-10-23 NOTE — PROGRESS NOTES
Discharge Planning Assessment  [facility]     Patient Name: Issa Farmer  MRN: [id number]  Today's Date: 10/23/2020    Admit Date: 10/22/2020    Discharge Needs Assessment     Row Name 10/23/20 0821       Living Environment    Lives With  grandchild(bere)    Current Living Arrangements  home/apartment/condo    Primary Care Provided by  self    Provides Primary Care For  no one    Family Caregiver if Needed  child(bere), adult;grandchild(bere), adult    Able to Return to Prior Arrangements  yes       Transition Planning    Patient/Family Anticipates Transition to  home    Transportation Anticipated  family or friend will provide       Discharge Needs Assessment    Equipment Currently Used at Home  none        Discharge Plan     Row Name 10/23/20 0821       Plan    Plan  Home    Patient/Family in Agreement with Plan  yes    Plan Comments  Spoke w/ patient and daughter at bedside. Patient lives w/ her granddaughter in a one story in [place] PTA she was independent with ADLs and mobility. She denies any needs at this time. Plan is to return home w/ assist from family. CM following.    Final Discharge Disposition Code  01 - home or self-care        Continued Care and Services - Admitted Since 10/22/2020    Coordination has not been started for this encounter.         Demographic Summary     Row Name 10/23/20 0820       General Information    Arrived From  home    Reason for Consult  discharge planning        Functional Status     Row Name 10/23/20 0820       Functional Status    Usual Activity Tolerance  moderate    Current Activity Tolerance  moderate        Psychosocial    No documentation.       Abuse/Neglect    No documentation.       Legal    No documentation.       Substance Abuse    No documentation.       Patient Forms    No documentation.           Summer Thompson RN

## 2020-10-23 NOTE — THERAPY DISCHARGE NOTE
Acute Care - Occupational Therapy Discharge  [facility]    Patient Name: Issa Farmer  : 1945    MRN: [id number]                              Today's Date: 10/23/2020       Admit Date: 10/22/2020    Visit Dx:     ICD-10-CM ICD-9-CM   1. Status post total shoulder arthroplasty, right  Z96.611 V43.61     Patient Active Problem List   Diagnosis   • HTN (hypertension)   • Glenohumeral arthritis, right   • Status post total shoulder arthroplasty, right   • Elevated hemoglobin A1c     Past Medical History:   Diagnosis Date   • Cancer (CMS/HCC)     Left breast cancer    • Hypertension    • Shoulder pain     right     Past Surgical History:   Procedure Laterality Date   • EXPLORATORY LAPAROTOMY, TOTAL ABDOMINAL HYSTERECTOMY WITH TUMOR DEBULKING N/A 10/8/2018    Procedure: EXPLORATORY LAPAROTOMY, TOTAL ABDOMINAL HYSTERECTOMY, BILATERAL SALPINGO-OOPHORECTOMY;  Surgeon: Deysi Watts MD;  Location: [facility];  Service: Gynecology   • HYSTERECTOMY     • MASTECTOMY Left    • REPLACEMENT TOTAL KNEE Left    • TOTAL SHOULDER ARTHROPLASTY Right 10/22/2020    Procedure: TOTAL SHOULDER ARTHROPASTY RIGHT;  Surgeon: Anderson Muñiz MD;  Location: [facility];  Service: Orthopedics;  Laterality: Right;   • TUBAL ABDOMINAL LIGATION       General Information     Row Name 10/23/20 1114          OT Time and Intention    Document Type  discharge treatment  -HK     Mode of Treatment  occupational therapy  -     Row Name 10/23/20 1114          General Information    Patient Profile Reviewed  yes  -HK     Prior Level of Function  independent:;all household mobility;community mobility;gait;transfer;bed mobility;work;min assist:;ADL's  -HK     Existing Precautions/Restrictions  fall;non-weight bearing;right;shoulder NWB R UE; R UE in sling; R interscalene nerve catheter.  -HK     Barriers to Rehab  none identified  -HK     Row Name 10/23/20 1114          Cognition    Orientation Status (Cognition)  oriented x 4   -     Row Name 10/23/20 1114          Safety Issues, Functional Mobility    Safety Issues Affecting Function (Mobility)  safety precaution awareness  -     Impairments Affecting Function (Mobility)  pain;balance;range of motion (ROM);strength  -       User Key  (r) = Recorded By, (t) = Taken By, (c) = Cosigned By    Initials Name Provider Type    HK Vianey Shrap, OT Occupational Therapist        Mobility/ADL's     Row Name 10/23/20 1115          Bed Mobility    Comment (Bed Mobility)  Pt received and left [facility]. OT reviewed completion of bed mobility while maintaining shoulder precautions.  -     Row Name 10/23/20 1115          Transfers    Transfers  sit-stand transfer  -     Comment (Transfers)  Verbal cues for safety.  -     Sit-Stand [place] (Transfers)  supervision  -     Row Name 10/23/20 1115          Sit-Stand Transfer    Assistive Device (Sit-Stand Transfers)  other (see comments) none  -     Row Name 10/23/20 1115          Functional Mobility    Functional Mobility- Comment  not tested this date. Deferred to PT.  -     Row Name 10/23/20 1115          Activities of Daily Living    BADL Assessment/Intervention  bathing;upper body dressing;lower body dressing  -     Row Name 10/23/20 1115          Mobility    Extremity Weight-bearing Status  right upper extremity  -HK     Right Upper Extremity (Weight-bearing Status)  non weight-bearing (NWB)  -     Row Name 10/23/20 1115          Upper Body Dressing Assessment/Training    [place] Level (Upper Body Dressing)  doff;don;other (see comments);maximum assist (25% patient effort);pull-over garment;moderate assist (50% patient effort) sling  -HK     Position (Upper Body Dressing)  unsupported sitting  -HK     Comment (Upper Body Dressing)  OT reviewed all sling management as well as wear and care, shoulder precautions, axilla care, deejay dressing, and care of nerve catheter during ADLS. Daughter with excellent teach back on all sling  management and able to dof/don sling with supervision from OT. Daughter assisted pt to don pullover shirt via deejay dressing technique.  -     Row Name 10/23/20 1115          Bathing Assessment/Intervention    Comment (Bathing)  OT reviewed completion of R axilla care while maintaining shoulder precautions. Pt required maxA for completion this date. OT issued LH sponge and educated pt on use for completion of bathing while maintaining shoulder precautions.  -     Row Name 10/23/20 1115          Lower Body Dressing Assessment/Training    [place] Level (Lower Body Dressing)  don;pants/bottoms;maximum assist (25% patient effort);verbal cues  -     Position (Lower Body Dressing)  unsupported sitting;unsupported standing  -       User Key  (r) = Recorded By, (t) = Taken By, (c) = Cosigned By    Initials Name Provider Type     Vianey Sharp, OT Occupational Therapist        Obj/Interventions     Row Name 10/23/20 1122          Sensory Assessment (Somatosensory)    Sensory Assessment (Somatosensory)  sensation intact  -[facility] Name 10/23/20 1122          Vision Assessment/Intervention    Visual Impairment/Limitations  corrective lenses full-time  -     Row Name 10/23/20 1122          Elbow/Forearm (Therapeutic Exercise)    Elbow/Forearm (Therapeutic Exercise)  AROM (active range of motion)  -     Elbow/Forearm AROM (Therapeutic Exercise)  right;extension;flexion;supination;pronation;10 repetitions;sitting  -     Row Name 10/23/20 1122          Wrist (Therapeutic Exercise)    Wrist (Therapeutic Exercise)  AROM (active range of motion)  -     Wrist AROM (Therapeutic Exercise)  right;flexion;extension;10 repetitions  -     Row Name 10/23/20 1122          Hand (Therapeutic Exercise)    Hand (Therapeutic Exercise)  AROM (active range of motion)  -     Hand AROM/AAROM (Therapeutic Exercise)  right;finger flexion;finger extension;10 repetitions  -     Row Name 10/23/20 1122          Balance     Balance Assessment  sitting static balance;sitting dynamic balance;standing static balance;standing dynamic balance  -HK     Static Sitting Balance  WNL  -HK     Dynamic Sitting Balance  WFL  -HK     Static Standing Balance  WNL  -HK     Dynamic Standing Balance  WFL  -HK     Row Name 10/23/20 1122          Therapeutic Exercise    Therapeutic Exercise  elbow/forearm;wrist;hand  -HK       User Key  (r) = Recorded By, (t) = Taken By, (c) = Cosigned By    Initials Name Provider Type    HK Vianey Sharp, OT Occupational Therapist        Goals/Plan     Row Name 10/23/20 1127          Bed Mobility Goal 1 (OT)    Activity/Assistive Device (Bed Mobility Goal 1, OT)  sit to supine/supine to sit;scooting  -HK     [place] Level/Cues Needed (Bed Mobility Goal 1, OT)  contact guard assist;verbal cues required  -HK     Time Frame (Bed Mobility Goal 1, OT)  by discharge  -HK     Progress/Outcomes (Bed Mobility Goal 1, OT)  goal met  -     Row Name 10/23/20 1127          Transfer Goal 1 (OT)    Activity/Assistive Device (Transfer Goal 1, OT)  sit-to-stand/stand-to-sit;toilet  -HK     [place] Level/Cues Needed (Transfer Goal 1, OT)  verbal cues required;contact guard assist  -HK     Time Frame (Transfer Goal 1, OT)  by discharge  -HK     Progress/Outcome (Transfer Goal 1, OT)  goal met  -     Row Name 10/23/20 1127          Dressing Goal 1 (OT)    Activity/Device (Dressing Goal 1, OT)  upper body dressing  -HK     [place]/Cues Needed (Dressing Goal 1, OT)  supervision required;verbal cues required  -HK     Time Frame (Dressing Goal 1, OT)  by discharge  -HK     Progress/Outcome (Dressing Goal 1, OT)  goal met  -     Row Name 10/23/20 1127          ROM Goal 1 (OT)    ROM Goal 1 (OT)  Pt and daughter will adequately demonstrate R UE HEP per surgeons preference.  -HK     Time Frame (ROM Goal 1, OT)  by discharge  -HK     Progress/Outcome (ROM Goal 1, OT)  goal met  -HK       User Key  (r) = Recorded By, (t) = Taken  By, (c) = Cosigned By    Initials Name Provider Type    HK Aron Vianey IRMA, OT Occupational Therapist        Clinical Impression     Row Name 10/23/20 1124          Pain Assessment    Additional Documentation  Pain Scale: FACES Pre/Post-Treatment (Group)  -     Row Name 10/23/20 1124          Pain Scale: Numbers Pre/Post-Treatment    Pretreatment Pain Rating  4/10  -HK     Posttreatment Pain Rating  4/10  -HK     Pain Location - Side  Right  -HK     Pain Location - Orientation  generalized  -HK     Pain Location  shoulder  -HK     Pain Intervention(s)  Ambulation/increased activity;Repositioned  -     Row Name 10/23/20 1124          Plan of Care Review    Plan of Care Reviewed With  patient;daughter  -     Progress  improving  -HK     Outcome Summary  OT reviewed all sling management as well as wear and care, shoulder precautions, axilla care, deejay dressing, and care of nerve catheter during ADLS. Daughter with excellent teach back on all sling management and able to assist pt to don pullover shirt via deejay dressing technique. OT deferred all mobility to PT this date. Pt completed AROM x10 reps elbow/wrist/hand. Pt with minimal complaints of pain throughout and ARROW pump infusing at 6. Pt plans to d/c home this date with assist from family.  -     Row Name 10/23/20 1124          Therapy Assessment/Plan (OT)    Criteria for Skilled Therapeutic Interventions Met (OT)  yes;skilled treatment is necessary  -     Therapy Frequency (OT)  daily  -     Row Name 10/23/20 1124          Therapy Plan Review/Discharge Plan (OT)    Anticipated Discharge Disposition (OT)  home with assist  -     Row Name 10/23/20 1124          Vital Signs    Pre Systolic BP Rehab  -- RN cleared for tx; VSS  -HK     Pre SpO2 (%)  93  -HK     O2 Delivery Pre Treatment  room air  -HK     Intra SpO2 (%)  92  -HK     O2 Delivery Intra Treatment  room air  -HK     Post SpO2 (%)  92  -HK     O2 Delivery Post Treatment  room air  -HK     Pre  Patient Position  Sitting  -HK     Intra Patient Position  Standing  -HK     Post Patient Position  Sitting  -HK     Row Name 10/23/20 1124          Positioning and Restraints    Pre-Treatment Position  sitting in chair/recliner  -HK     Post Treatment Position  chair  -HK     In Chair  notified nsg;reclined;call light within reach;encouraged to call for assist;exit alarm on;with family/caregiver  -HK       User Key  (r) = Recorded By, (t) = Taken By, (c) = Cosigned By    Initials Name Provider Type    Vianey Cruz OT Occupational Therapist        Outcome Measures     Row Name 10/23/20 1128          How much help from another is currently needed...    Putting on and taking off regular lower body clothing?  2  -HK     Bathing (including washing, rinsing, and drying)  2  -HK     Toileting (which includes using toilet bed pan or urinal)  3  -HK     Putting on and taking off regular upper body clothing  2  -HK     Taking care of personal grooming (such as brushing teeth)  3  -HK     Eating meals  3  -HK     AM-PAC 6 Clicks Score (OT)  15  -HK     Row Name 10/23/20 1128          Functional Assessment    Outcome Measure Options  AM-PAC 6 Clicks Daily Activity (OT)  -HK       User Key  (r) = Recorded By, (t) = Taken By, (c) = Cosigned By    Initials Name Provider Type    Vianey Cruz OT Occupational Therapist        Occupational Therapy Education                 Title: PT OT SLP Therapies (Done)     Topic: Occupational Therapy (Done)     Point: ADL training (Done)     Description:   Instruct learner(s) on proper safety adaptation and remediation techniques during self care or transfers.   Instruct in proper use of assistive devices.              Learning Progress Summary           Patient Acceptance, E,TB,D,H, VU,DU by  at 10/23/2020 0856    Acceptance, E,TB,D,H, VU,NR by  at 10/22/2020 1552                   Point: Home exercise program (Done)     Description:   Instruct learner(s) on appropriate technique  for monitoring, assisting and/or progressing therapeutic exercises/activities.              Learning Progress Summary           Patient Acceptance, E,TB,D,H, VU,DU by  at 10/23/2020 0856    Acceptance, E,TB,D,H, VU,NR by  at 10/22/2020 1552                   Point: Precautions (Done)     Description:   Instruct learner(s) on prescribed precautions during self-care and functional transfers.              Learning Progress Summary           Patient Acceptance, E,TB,D,H, VU,DU by  at 10/23/2020 0856    Acceptance, E,TB,D,H, VU,NR by  at 10/22/2020 1552                   Point: Body mechanics (Done)     Description:   Instruct learner(s) on proper positioning and spine alignment during self-care, functional mobility activities and/or exercises.              Learning Progress Summary           Patient Acceptance, E,TB,D,H, VU,DU by  at 10/23/2020 0856    Acceptance, E,TB,D,H, VU,NR by  at 10/22/2020 1552                               User Key     Initials Effective Dates Name Provider Type Discipline     03/07/18 -  Vianey Sharp, OT Occupational Therapist OT              OT Recommendation and Plan  Retired Outcome Summary/Treatment Plan (OT)  Anticipated Discharge Disposition (OT): home with assist  Therapy Frequency (OT): daily  Plan of Care Review  Plan of Care Reviewed With: patient, daughter  Progress: improving  Outcome Summary: OT reviewed all sling management as well as wear and care, shoulder precautions, axilla care, deejay dressing, and care of nerve catheter during ADLS. Daughter with excellent teach back on all sling management and able to assist pt to don pullover shirt via deejay dressing technique. OT deferred all mobility to PT this date. Pt completed AROM x10 reps elbow/wrist/hand. Pt with minimal complaints of pain throughout and ARROW pump infusing at 6. Pt plans to d/c home this date with assist from family.  Plan of Care Reviewed With: patient, daughter  Outcome Summary: OT reviewed all  sling management as well as wear and care, shoulder precautions, axilla care, deejay dressing, and care of nerve catheter during ADLS. Daughter with excellent teach back on all sling management and able to assist pt to don pullover shirt via deejay dressing technique. OT deferred all mobility to PT this date. Pt completed AROM x10 reps elbow/wrist/hand. Pt with minimal complaints of pain throughout and ARROW pump infusing at 6. Pt plans to d/c home this date with assist from family.     Time Calculation:   Time Calculation- OT     Row Name 10/23/20 0856             Time Calculation- OT    OT Start Time  0856  -      OT Received On  10/23/20  -      OT Goal Re-Cert Due Date  11/02/20  -         Timed Charges    32842 - OT Therapeutic Exercise Minutes  12  -HK      09316 - OT Self Care/Mgmt Minutes  18  -HK        User Key  (r) = Recorded By, (t) = Taken By, (c) = Cosigned By    Initials Name Provider Type     Vianey Sharp OT Occupational Therapist        Therapy Charges for Today     Code Description Service Date Service Provider Modifiers Qty    22097923876 HC OT THER PROC EA 15 MIN 10/22/2020 Vianey Sharp, OT GO 1    25912511458 HC OT SELF CARE/MGMT/TRAIN EA 15 MIN 10/22/2020 Vianey Sharp OT GO 1    47387173948 HC OT EVAL LOW COMPLEXITY 3 10/22/2020 Vianey Sharp, OT GO 1    97286122664 HC OT SELF CARE/MGMT/TRAIN EA 15 MIN 10/23/2020 Vianey Sharp OT GO 1    65649608755 HC OT THER PROC EA 15 MIN 10/23/2020 Vianey Sharp, OT GO 1    77333756500 HC OT THER SUPP EA 15 MIN 10/23/2020 Vianey Sharp, OT GO 1               Vianey Sharp OT  10/23/2020

## 2020-10-23 NOTE — DISCHARGE SUMMARY
Patient Name: Issa Farmer  MRN: [id number]  : 1945  DOS: 10/23/2020    Attending: Anderson Muñiz MD    Primary Care Provider: Yan Unger DO    Date of Admission:.10/22/2020  5:21 AM    Date of Discharge:  10/23/2020    Discharge Diagnosis:   Status post total shoulder arthroplasty, right    Glenohumeral arthritis, right    HTN (hypertension)    Elevated hemoglobin A1c    Leukocytosis, mild, likely reactive    Acute postoperative pain      Hospital Course    At admit:    Patient is a pleasant 74 y.o. female presented for scheduled surgery by Dr. Muñiz.  She underwent right total shoulder arthroplasty under general anesthesia.  She tolerated surgery well and was admitted for further medical management.  Her shoulder has been painful with limited range of motion for about 4 years.  She denies use of assistive device for ambulation.  She does report a fall back in September at home, without loss of consciousness.    After admit:    Patient was provided pain medications as needed for pain control, along with interscalene nerve block infusion of Ropivacaine.    Adjustments were made to pain medications to optimize postop pain management.   Risks and benefits of opiate medications discussed with patient.  Juan Luis report in chart was reviewed prior to discharge.    The patient was seen by OT and was taught exercises for her right arm.  The patient used an IS for atelectasis prophylaxis and mechanicals for DVT prophylaxis.    Home medications were resumed as appropriate, and labs were monitored and remained fairly stable.     With the progress she has made, she is ready for DC home today.      The patient will have an arrow pump ( instructed on it during this admit)  Discussed with patient regarding plan and she shows understanding and agreement.        Procedures Performed    Preoperative Diagnosis: right  shoulder degenerative arthritis     Postoperative Diagnosis:  "Same     Procedure: right Total shoulder arthroplasty     Surgeon: Dr. Anderson Muñiz    Pertinent Test Results:    I reviewed the patient's new clinical results.   Results from last 7 days   Lab Units 10/23/20  1044   WBC 10*3/mm3 11.17*   HEMOGLOBIN g/dL 12.6   HEMATOCRIT % 41.7   PLATELETS 10*3/mm3 221     Results from last 7 days   Lab Units 10/23/20  1044   SODIUM mmol/L 136   POTASSIUM mmol/L 4.1   CHLORIDE mmol/L 99   CO2 mmol/L 28.0   BUN mg/dL 10   CREATININE mg/dL 0.68   CALCIUM mg/dL 9.3   GLUCOSE mg/dL 92     I reviewed the patient's new imaging including images and reports.      Occupational Therapy: OT reviewed all sling management as well as wear and care, shoulder precautions, axilla care, deejay dressing, and care of nerve catheter during ADLS. Daughter with excellent teach back on all sling management and able to assist pt to don pullover shirt via deejay dressing technique. OT deferred all mobility to PT this date. Pt completed AROM x10 reps elbow/wrist/hand. Pt with minimal complaints of pain throughout and ARROW pump infusing at 6. Pt plans to d/c home this date with assist from family.      Physical therapy: Patient demonstrarted safe ambulation with cane in hallway. Her SATs did drop to 84% on room air  at end of 350 foot walk. Will breathing cues, they return to 92 % within a minute. SHe is going home with her daughter and has been issued a cane.      Discharge Assessment:    Vital Signs  Visit Vitals  /86 (BP Location: Left arm, Patient Position: Lying)   Pulse 83   Temp 98.4 °F (36.9 °C) (Oral)   Resp 16   Ht 162.6 cm (64\")   Wt 75.3 kg (166 lb)   SpO2 96%   BMI 28.49 kg/m²     Temp (24hrs), Av.4 °F (36.9 °C), Min:97.6 °F (36.4 °C), Max:100.4 °F (38 °C)      General Appearance:    Alert, cooperative, in no acute distress   Lungs:     Clear to auscultation,respirations regular, even and   unlabored    Heart:    Regular rhythm and normal rate, normal S1 and S2    Abdomen:     Normal bowel " sounds, no masses, no organomegaly, soft   non-tender, non-distended, no guarding, no rebound tenderness   Extremities:   RUE in a sling, CDI Aquacel dressing. Interscalene nerve block cath present. Distal pulses, cap refill, movements of fingers, wrist, intact.   Pulses:   Pulses palpable and equal bilaterally   Skin:   No bleeding, bruising or rash   Neurologic:   Cranial nerves 2 - 12 grossly intact       Discharge Disposition: Home          Discharge Medications      New Medications      Instructions Start Date   oxyCODONE 5 MG immediate release tablet  Commonly known as: Roxicodone   5 mg, Oral, Every 4 Hours PRN      Ropivacine HCl-NaCl  Commonly known as: NAROPIN   6 mL/hr (12 mg/hr), Peripheral Nerve, Continuous         Continue These Medications      Instructions Start Date   acetaminophen 325 MG tablet  Commonly known as: TYLENOL   650 mg, Oral, Every 6 Hours Scheduled      aspirin 81 MG EC tablet   81 mg, Oral, Daily      Cranberry 250 MG tablet   1 tablet, Oral, Daily      docusate sodium 100 MG capsule   200 mg, Oral, 2 Times Daily      ibuprofen 600 MG tablet  Commonly known as: ADVIL,MOTRIN   600 mg, Oral, Every 6 Hours PRN      losartan 50 MG tablet  Commonly known as: COZAAR   50 mg, Oral, Daily      ondansetron 4 MG tablet  Commonly known as: ZOFRAN   Take 1 tablet by mouth Every 8 (Eight) Hours As Needed for post-op nausea.      polyethylene glycol 17 GM/SCOOP powder  Commonly known as: MIRALAX   17 g, Oral, Daily      senna 8.6 MG tablet  Commonly known as: SENOKOT   1 tablet, Oral, Daily         Stop These Medications    benzoyl peroxide 5 % external liquid  Generic drug: benzoyl peroxide            Discharge Diet: Regular diet      Activity at Discharge: SHANA Jennings      Follow-up Appointments  Dr. Muñiz per his orders    Discharge took over 30 min    TRICIA Cuellar  10/23/20  13:35 EDT

## 2020-10-23 NOTE — THERAPY DISCHARGE NOTE
Patient Name: Issa Farmer  : 1945    MRN: [id number]                              Today's Date: 10/23/2020       Admit Date: 10/22/2020    Visit Dx:     ICD-10-CM ICD-9-CM   1. Status post total shoulder arthroplasty, right  Z96.611 V43.61     Patient Active Problem List   Diagnosis   • HTN (hypertension)   • Glenohumeral arthritis, right   • Status post total shoulder arthroplasty, right   • Elevated hemoglobin A1c     Past Medical History:   Diagnosis Date   • Cancer (CMS/HCC)     Left breast cancer    • Hypertension    • Shoulder pain     right     Past Surgical History:   Procedure Laterality Date   • EXPLORATORY LAPAROTOMY, TOTAL ABDOMINAL HYSTERECTOMY WITH TUMOR DEBULKING N/A 10/8/2018    Procedure: EXPLORATORY LAPAROTOMY, TOTAL ABDOMINAL HYSTERECTOMY, BILATERAL SALPINGO-OOPHORECTOMY;  Surgeon: Deysi Watts MD;  Location:  [facility];  Service: Gynecology   • HYSTERECTOMY     • MASTECTOMY Left    • REPLACEMENT TOTAL KNEE Left    • TOTAL SHOULDER ARTHROPLASTY Right 10/22/2020    Procedure: TOTAL SHOULDER ARTHROPASTY RIGHT;  Surgeon: Anderson Muñiz MD;  Location:  [facility];  Service: Orthopedics;  Laterality: Right;   • TUBAL ABDOMINAL LIGATION       General Information     Row Name 10/23/20 1043          Physical Therapy Time and Intention    Document Type  evaluation  -SC     Mode of Treatment  physical therapy  -SC     Row Name 10/23/20 1043          General Information    Patient Profile Reviewed  yes  -SC     Existing Precautions/Restrictions  fall;non-weight bearing;right;shoulder  -SC     Row Name 10/23/20 1043          Cognition    Orientation Status (Cognition)  oriented x 4  -SC     Row Name 10/23/20 1043          Safety Issues, Functional Mobility    Impairments Affecting Function (Mobility)  balance;pain;strength;range of motion (ROM);endurance/activity tolerance  -SC     Comment, Safety Issues/Impairments (Mobility)  alert, following commands  -SC       User Key   (r) = Recorded By, (t) = Taken By, (c) = Cosigned By    Initials Name Provider Type    SC Glenny Harrell, PT Physical Therapist        Mobility     Row Name 10/23/20 1044          Bed Mobility    Comment (Bed Mobility)  uic  -SC     Row Name 10/23/20 1044          Transfers    Comment (Transfers)  transfered from chair with good technique  -SC     Row Name 10/23/20 1044          Sit-Stand Transfer    Sit-Stand [place] (Transfers)  supervision;verbal cues  -SC     Assistive Device (Sit-Stand Transfers)  cane, straight  -SC     Row Name 10/23/20 1044          Gait/Stairs (Locomotion)    [place] Level (Gait)  standby assist  -SC     Assistive Device (Gait)  cane, straight  -SC     Distance in Feet (Gait)  350  -SC     Deviations/Abnormal Patterns (Gait)  gait speed decreased  -SC     Comment (Gait/Stairs)  Demonstrated slightly unsteady gait . Cues to sequence cane given--patient demonstrated good use of this, NO LOB.  -SC     Row Name 10/23/20 1044          Mobility    Extremity Weight-bearing Status  right upper extremity  -SC     Right Upper Extremity (Weight-bearing Status)  non weight-bearing (NWB)  -SC       User Key  (r) = Recorded By, (t) = Taken By, (c) = Cosigned By    Initials Name Provider Type    SC Glenny Harrell PT Physical Therapist        Obj/Interventions     Row Name 10/23/20 1046          Range of Motion Comprehensive    Comment, General Range of Motion  R UE: arm in sling. other jts wfl  -SC     Row Name 10/23/20 1046          Strength Comprehensive (MMT)    Comment, General Manual Muscle Testing (MMT) Assessment  R UE moving fingers. B LE grossly 4+/5  -SC     Row Name 10/23/20 1046          Motor Skills    Therapeutic Exercise  other (see comments) spirometer training x10 reps  -SC     Row Name 10/23/20 1046          Sensory Assessment (Somatosensory)    Sensory Assessment (Somatosensory)  -- diminished R UE 2 to nerve cath  -SC       User Key  (r) = Recorded By, (t) = Taken By, (c)  = Cosigned By    Initials Name Provider Type    SC Glenny Harrell, PT Physical Therapist        Goals/Plan    No documentation.       Clinical Impression     Row Name 10/23/20 1050          Pain    Additional Documentation  Pain Scale: Numbers Pre/Post-Treatment (Group)  -SC     Row Name 10/23/20 1050          Pain Scale: Numbers Pre/Post-Treatment    Pretreatment Pain Rating  0/10 - no pain  -SC     Posttreatment Pain Rating  0/10 - no pain  -SC     Row Name 10/23/20 1050          Pain Scale: FACES Pre/Post-Treatment    Pain Location - Side  Right  -SC     Pain Location - Orientation  upper  -SC     Pain Location  extremity  -SC     Row Name 10/23/20 1050          Plan of Care Review    Plan of Care Reviewed With  patient;daughter  -SC     Progress  improving  -SC     Outcome Summary  Outcome Summary: Patient demonstrarted safe ambulation with cane in hallway. Her SATs did drop to 84% on room air  at end of 350 foot walk. Will breathing cues, they return to 92 % within a minute. SHe is going home with her daughter and has been issued a cane.  -SC     Row Name 10/23/20 1050          Therapy Assessment/Plan (PT)    Criteria for Skilled Interventions Met (PT)  yes  -SC     Row Name 10/23/20 1050          Vital Signs    Pretreatment Heart Rate (beats/min)  95  -SC     Intratreatment Heart Rate (beats/min)  102  -SC     Pre SpO2 (%)  95  -SC     O2 Delivery Pre Treatment  room air  -SC     Intra SpO2 (%)  84  -SC     O2 Delivery Intra Treatment  room air  -SC     Post SpO2 (%)  92  -SC     Row Name 10/23/20 1050          Positioning and Restraints    Pre-Treatment Position  sitting in chair/recliner  -SC     Post Treatment Position  chair  -SC     In Chair  sitting;call light within reach;with family/caregiver  -SC       User Key  (r) = Recorded By, (t) = Taken By, (c) = Cosigned By    Initials Name Provider Type    SC Glenny Harrell, PT Physical Therapist        Outcome Measures     Row Name 10/23/20 1057           How much help from another person do you currently need...    Moving from lying on back to sitting on the side of a flat bed without bedrails?  3  -SC     Moving to and from a bed to a chair (including a wheelchair)?  3  -SC     Standing up from a chair using your arms (e.g., wheelchair, bedside chair)?  3  -SC     Climbing 3-5 steps with a railing?  3  -SC     To walk in hospital room?  3  -SC     Row Name 10/23/20 1053          Functional Assessment    Outcome Measure Options  AM-PAC 6 Clicks Basic Mobility (PT)  -SC       User Key  (r) = Recorded By, (t) = Taken By, (c) = Cosigned By    Initials Name Provider Type    Glenny Allred PT Physical Therapist        Physical Therapy Education                 Title: PT OT SLP Therapies (Done)     Topic: Physical Therapy (Done)     Point: Mobility training (Done)     Learning Progress Summary           Patient SAMIA Beckham VU by SC at 10/23/2020 1054    Comment: reviewed safety with ambulation  reviewed use of spirometer  reviewed with daughter how to assist her up one step   Family SAMIA Beckham VU by SC at 10/23/2020 1054    Comment: reviewed safety with ambulation  reviewed use of spirometer  reviewed with daughter how to assist her up one step    SAMIA Beckham VU by SC at 10/23/2020 1054    Comment: reviewed safety with ambulation  reviewed use of spirometer  reviewed with daughter how to assist her up one step                   Point: Home exercise program (Done)     Learning Progress Summary           Patient SAMIA Beckham VU by SC at 10/23/2020 1054    Comment: reviewed safety with ambulation  reviewed use of spirometer  reviewed with daughter how to assist her up one step   Family SAMIA Beckham VU by SC at 10/23/2020 1054    Comment: reviewed safety with ambulation  reviewed use of spirometer  reviewed with daughter how to assist her up one step    SAMIA Beckham VU by SC at 10/23/2020 1054    Comment: reviewed safety with ambulation  reviewed use of  spirometer  reviewed with daughter how to assist her up one step                   Point: Body mechanics (Done)     Learning Progress Summary           Patient SAMIA Beckham VU by SC at 10/23/2020 1054    Comment: reviewed safety with ambulation  reviewed use of spirometer  reviewed with daughter how to assist her up one step   Family SAMIA Beckham VU by SC at 10/23/2020 1054    Comment: reviewed safety with ambulation  reviewed use of spirometer  reviewed with daughter how to assist her up one step    SAMIA Beckham VU by SC at 10/23/2020 1054    Comment: reviewed safety with ambulation  reviewed use of spirometer  reviewed with daughter how to assist her up one step                   Point: Precautions (Done)     Learning Progress Summary           Patient SAMIA Beckham VU by SC at 10/23/2020 1054    Comment: reviewed safety with ambulation  reviewed use of spirometer  reviewed with daughter how to assist her up one step   Family SAMIA Beckham VU by SC at 10/23/2020 1054    Comment: reviewed safety with ambulation  reviewed use of spirometer  reviewed with daughter how to assist her up one step    SAMIA Beckham VU by SC at 10/23/2020 1054    Comment: reviewed safety with ambulation  reviewed use of spirometer  reviewed with daughter how to assist her up one step                               User Key     Initials Effective Dates Name Provider Type Discipline    SC 06/19/15 -  Glenny Harrell, PT Physical Therapist PT              PT Recommendation and Plan     Plan of Care Reviewed With: patient, daughter  Progress: improving  Outcome Summary: Outcome Summary: Patient demonstrarted safe ambulation with cane in hallway. Her SATs did drop to 84% on room air  at end of 350 foot walk. Will breathing cues, they return to 92 % within a minute. SHe is going home with her daughter and has been issued a cane.     Time Calculation:   PT Charges     Row Name 10/23/20 1005             Time Calculation    Start Time  1005   -SC      PT Received On  10/23/20  -SC        User Key  (r) = Recorded By, (t) = Taken By, (c) = Cosigned By    Initials Name Provider Type    Glenny Allred PT Physical Therapist        Therapy Charges for Today     Code Description Service Date Service Provider Modifiers Qty    [id number] HC PT EVAL MOD COMPLEXITY 4 10/23/2020 Glenny Harrell PT GP 1          PT G-Codes  Outcome Measure Options: AM-PAC 6 Clicks Basic Mobility (PT)  AM-PAC 6 Clicks Score (OT): 15    PT Discharge Summary  Anticipated Discharge Disposition (PT): home with assist    Glenny Harrell PT  10/23/2020

## 2020-10-23 NOTE — PLAN OF CARE
Problem: Adult Inpatient Plan of Care  Goal: Plan of Care Review  Recent Flowsheet Documentation  Taken 10/23/2020 1124 by Vianey Sharp OT  Progress: improving  Plan of Care Reviewed With:   patient   daughter  Outcome Summary: OT reviewed all sling management as well as wear and care, shoulder precautions, axilla care, deejay dressing, and care of nerve catheter during ADLS. Daughter with excellent teach back on all sling management and able to assist pt to don pullover shirt via deejay dressing technique. OT deferred all mobility to PT this date. Pt completed AROM x10 reps elbow/wrist/hand. Pt with minimal complaints of pain throughout and ARROW pump infusing at 6. Pt plans to d/c home this date with assist from family.

## 2020-10-23 NOTE — PLAN OF CARE
Problem: Adult Inpatient Plan of Care  Goal: Plan of Care Review  Outcome: Ongoing, Progressing  Flowsheets  Taken 10/23/2020 0550 by Janine Morse RN  Progress: improving  Outcome Summary: Pt. resting well through the shift. PRN pain meds given per order. Up w/standby assist to BR. Ambulated in hallway w/ staff. Voiding appropriately. Arrow pump infusing at 6mL/hr. 2L NC while resting. VSS.  Taken 10/22/2020 1721 by Brisa King RN  Plan of Care Reviewed With: patient  Goal: Patient-Specific Goal (Individualized)  Outcome: Ongoing, Progressing  Goal: Absence of Hospital-Acquired Illness or Injury  Outcome: Ongoing, Progressing  Intervention: Identify and Manage Fall Risk  Recent Flowsheet Documentation  Taken 10/23/2020 0200 by Janine Morse RN  Safety Promotion/Fall Prevention:   activity supervised   assistive device/personal items within reach   clutter free environment maintained   elopement precautions   fall prevention program maintained   gait belt   nonskid shoes/slippers when out of bed   room organization consistent   safety round/check completed   toileting scheduled  Taken 10/23/2020 0035 by Janine Morse RN  Safety Promotion/Fall Prevention:   activity supervised   assistive device/personal items within reach   clutter free environment maintained   elopement precautions   fall prevention program maintained   gait belt   nonskid shoes/slippers when out of bed   room organization consistent   safety round/check completed   toileting scheduled  Taken 10/22/2020 2212 by Janine Morse RN  Safety Promotion/Fall Prevention:   activity supervised   assistive device/personal items within reach   clutter free environment maintained   elopement precautions   fall prevention program maintained   gait belt   nonskid shoes/slippers when out of bed   room organization consistent   safety round/check completed   toileting scheduled  Taken 10/22/2020 1944 by Janine Morse RN  Safety  Promotion/Fall Prevention:   activity supervised   assistive device/personal items within reach   clutter free environment maintained   elopement precautions   fall prevention program maintained   gait belt   nonskid shoes/slippers when out of bed   room organization consistent   safety round/check completed   toileting scheduled  Intervention: Prevent Skin Injury  Recent Flowsheet Documentation  Taken 10/23/2020 0200 by Janine Morse RN  Body Position: supine  Taken 10/23/2020 0035 by Janine Morse RN  Body Position: supine  Taken 10/22/2020 2212 by Janine Morse RN  Body Position: supine  Taken 10/22/2020 1944 by Janine Morse RN  Body Position: supine  Intervention: Prevent and Manage VTE (venous thromboembolism) Risk  Recent Flowsheet Documentation  Taken 10/23/2020 0200 by Janine Morse RN  VTE Prevention/Management:   bilateral   sequential compression devices on  Taken 10/23/2020 0035 by Janine Morse RN  VTE Prevention/Management:   bilateral   sequential compression devices on  Taken 10/22/2020 2212 by Janine Morse RN  VTE Prevention/Management:   bilateral   sequential compression devices on  Taken 10/22/2020 1944 by Janine Morse RN  VTE Prevention/Management:   bilateral   sequential compression devices on  Goal: Optimal Comfort and Wellbeing  Outcome: Ongoing, Progressing  Intervention: Provide Person-Centered Care  Recent Flowsheet Documentation  Taken 10/22/2020 1944 by Janine Morse RN  Trust Relationship/Rapport:   care explained   thoughts/feelings acknowledged  Goal: Readiness for Transition of Care  Outcome: Ongoing, Progressing     Problem: Bleeding (Shoulder Arthroplasty)  Goal: Absence of Bleeding  Outcome: Ongoing, Progressing     Problem: Bowel Elimination Impaired (Shoulder Arthroplasty)  Goal: Effective Bowel Elimination  Outcome: Ongoing, Progressing     Problem: Joint Function Impaired (Shoulder Arthroplasty)  Goal: Optimal Functional  Ability  Outcome: Ongoing, Progressing  Intervention: Protect Joint Integrity  Recent Flowsheet Documentation  Taken 10/23/2020 0200 by Janine Morse RN  Positioning/Transfer Devices:   pillows   in use  Taken 10/23/2020 0035 by Janine Morse RN  Positioning/Transfer Devices:   pillows   in use  Taken 10/22/2020 2212 by Janine Morse RN  Positioning/Transfer Devices:   pillows   in use  Taken 10/22/2020 1944 by Janine Morse RN  Positioning/Transfer Devices:   pillows   in use     Problem: Infection (Shoulder Arthroplasty)  Goal: Absence of Infection Signs and Symptoms  Outcome: Ongoing, Progressing     Problem: Ongoing Anesthesia Effects (Shoulder Arthroplasty)  Goal: Anesthesia/Sedation Recovery  Outcome: Ongoing, Progressing  Intervention: Optimize Anesthesia Recovery  Recent Flowsheet Documentation  Taken 10/23/2020 0200 by Janine Morse RN  Safety Promotion/Fall Prevention:   activity supervised   assistive device/personal items within reach   clutter free environment maintained   elopement precautions   fall prevention program maintained   gait belt   nonskid shoes/slippers when out of bed   room organization consistent   safety round/check completed   toileting scheduled  Taken 10/23/2020 0035 by Janine Morse RN  Safety Promotion/Fall Prevention:   activity supervised   assistive device/personal items within reach   clutter free environment maintained   elopement precautions   fall prevention program maintained   gait belt   nonskid shoes/slippers when out of bed   room organization consistent   safety round/check completed   toileting scheduled  Taken 10/22/2020 2212 by Janine Morse RN  Safety Promotion/Fall Prevention:   activity supervised   assistive device/personal items within reach   clutter free environment maintained   elopement precautions   fall prevention program maintained   gait belt   nonskid shoes/slippers when out of bed   room organization consistent   safety  round/check completed   toileting scheduled  Taken 10/22/2020 1944 by Janine Morse RN  Safety Promotion/Fall Prevention:   activity supervised   assistive device/personal items within reach   clutter free environment maintained   elopement precautions   fall prevention program maintained   gait belt   nonskid shoes/slippers when out of bed   room organization consistent   safety round/check completed   toileting scheduled  Administration (IS): self-administered     Problem: Postoperative Nausea and Vomiting (Shoulder Arthroplasty)  Goal: Nausea and Vomiting Relief  Outcome: Ongoing, Progressing     Problem: Pain (Shoulder Arthroplasty)  Goal: Acceptable Pain Control  Outcome: Ongoing, Progressing  Intervention: Prevent or Manage Pain  Recent Flowsheet Documentation  Taken 10/23/2020 0052 by Janine Morse RN  Pain Management Interventions:   see MAR   cold applied  Taken 10/22/2020 1944 by Janine Morse RN  Diversional Activities: television     Problem: Postoperative Urinary Retention (Shoulder Arthroplasty)  Goal: Effective Urinary Elimination  Outcome: Ongoing, Progressing  Intervention: Monitor and Manage Urinary Retention  Recent Flowsheet Documentation  Taken 10/23/2020 0035 by Janine Morse RN  Urinary Elimination Promotion: toileting offered  Taken 10/22/2020 2212 by Janine Morse RN  Urinary Elimination Promotion: toileting offered  Taken 10/22/2020 1944 by Janine Morse RN  Urinary Elimination Promotion: toileting offered     Problem: Fall Injury Risk  Goal: Absence of Fall and Fall-Related Injury  Outcome: Ongoing, Progressing  Intervention: Identify and Manage Contributors to Fall Injury Risk  Recent Flowsheet Documentation  Taken 10/22/2020 1944 by Janine Morse RN  Medication Review/Management: medications reviewed  Intervention: Promote Injury-Free Environment  Recent Flowsheet Documentation  Taken 10/23/2020 0200 by Janine Morse RN  Safety Promotion/Fall Prevention:    activity supervised   assistive device/personal items within reach   clutter free environment maintained   elopement precautions   fall prevention program maintained   gait belt   nonskid shoes/slippers when out of bed   room organization consistent   safety round/check completed   toileting scheduled  Taken 10/23/2020 0035 by Janine Morse RN  Safety Promotion/Fall Prevention:   activity supervised   assistive device/personal items within reach   clutter free environment maintained   elopement precautions   fall prevention program maintained   gait belt   nonskid shoes/slippers when out of bed   room organization consistent   safety round/check completed   toileting scheduled  Taken 10/22/2020 2212 by Janine Morse RN  Safety Promotion/Fall Prevention:   activity supervised   assistive device/personal items within reach   clutter free environment maintained   elopement precautions   fall prevention program maintained   gait belt   nonskid shoes/slippers when out of bed   room organization consistent   safety round/check completed   toileting scheduled  Taken 10/22/2020 1944 by Janine Morse RN  Safety Promotion/Fall Prevention:   activity supervised   assistive device/personal items within reach   clutter free environment maintained   elopement precautions   fall prevention program maintained   gait belt   nonskid shoes/slippers when out of bed   room organization consistent   safety round/check completed   toileting scheduled   Goal Outcome Evaluation:  Plan of Care Reviewed With: patient  Progress: improving  Outcome Summary: Pt. resting well through the shift. PRN pain meds given per order. Up w/standby assist to BR. Ambulated in hallway w/ staff. Voiding appropriately. Arrow pump infusing at 6mL/hr. 2L NC while resting. VSS.

## 2020-10-26 NOTE — PROGRESS NOTES
TAMEKA Seth    Nerve Cath Post Op Call    Patient Name: Issa Farmer  :  1945  MRN:  [id number]  Date of Discharge: 10/23/2020    Nerve Cath Post Op Call:    Catheter Plan:Patient/Family member instructed to remove the catheter during telephone contact  Patient/Family instructed to call ON CALL anesthesia provider for any questions or problems.  Patient Follow Up:

## 2021-02-15 ENCOUNTER — APPOINTMENT (OUTPATIENT)
Dept: PREADMISSION TESTING | Facility: HOSPITAL | Age: 76
End: 2021-02-15

## 2021-02-15 VITALS — BODY MASS INDEX: 27.38 KG/M2 | HEIGHT: 66 IN | WEIGHT: 170.4 LBS

## 2021-02-15 LAB
ALBUMIN SERPL-MCNC: 4.4 G/DL (ref 3.5–5.2)
ALBUMIN/GLOB SERPL: 1.9 G/DL
ALP SERPL-CCNC: 56 U/L (ref 39–117)
ALT SERPL W P-5'-P-CCNC: 8 U/L (ref 1–33)
ANION GAP SERPL CALCULATED.3IONS-SCNC: 4 MMOL/L (ref 5–15)
APTT PPP: 30.5 SECONDS (ref 24–37)
AST SERPL-CCNC: 13 U/L (ref 1–32)
BACTERIA UR QL AUTO: ABNORMAL /HPF
BASOPHILS # BLD AUTO: 0.03 10*3/MM3 (ref 0–0.2)
BASOPHILS NFR BLD AUTO: 0.4 % (ref 0–1.5)
BILIRUB SERPL-MCNC: 0.3 MG/DL (ref 0–1.2)
BILIRUB UR QL STRIP: NEGATIVE
BUN SERPL-MCNC: 11 MG/DL (ref 8–23)
BUN/CREAT SERPL: 20 (ref 7–25)
CALCIUM SPEC-SCNC: 8.9 MG/DL (ref 8.6–10.5)
CHLORIDE SERPL-SCNC: 99 MMOL/L (ref 98–107)
CLARITY UR: CLEAR
CO2 SERPL-SCNC: 33 MMOL/L (ref 22–29)
COLOR UR: YELLOW
CREAT SERPL-MCNC: 0.55 MG/DL (ref 0.57–1)
DEPRECATED RDW RBC AUTO: 43.9 FL (ref 37–54)
EOSINOPHIL # BLD AUTO: 0.1 10*3/MM3 (ref 0–0.4)
EOSINOPHIL NFR BLD AUTO: 1.3 % (ref 0.3–6.2)
ERYTHROCYTE [DISTWIDTH] IN BLOOD BY AUTOMATED COUNT: 13.7 % (ref 12.3–15.4)
GFR SERPL CREATININE-BSD FRML MDRD: 107 ML/MIN/1.73
GLOBULIN UR ELPH-MCNC: 2.3 GM/DL
GLUCOSE SERPL-MCNC: 102 MG/DL (ref 65–99)
GLUCOSE UR STRIP-MCNC: NEGATIVE MG/DL
HBA1C MFR BLD: 5.9 % (ref 4.8–5.6)
HCT VFR BLD AUTO: 42.3 % (ref 34–46.6)
HGB BLD-MCNC: 13 G/DL (ref 12–15.9)
HGB UR QL STRIP.AUTO: ABNORMAL
HYALINE CASTS UR QL AUTO: ABNORMAL /LPF
IMM GRANULOCYTES # BLD AUTO: 0.02 10*3/MM3 (ref 0–0.05)
IMM GRANULOCYTES NFR BLD AUTO: 0.3 % (ref 0–0.5)
INR PPP: 1 (ref 0.85–1.16)
KETONES UR QL STRIP: NEGATIVE
LEUKOCYTE ESTERASE UR QL STRIP.AUTO: NEGATIVE
LYMPHOCYTES # BLD AUTO: 1.28 10*3/MM3 (ref 0.7–3.1)
LYMPHOCYTES NFR BLD AUTO: 17 % (ref 19.6–45.3)
MCH RBC QN AUTO: 27 PG (ref 26.6–33)
MCHC RBC AUTO-ENTMCNC: 30.7 G/DL (ref 31.5–35.7)
MCV RBC AUTO: 87.9 FL (ref 79–97)
MONOCYTES # BLD AUTO: 0.54 10*3/MM3 (ref 0.1–0.9)
MONOCYTES NFR BLD AUTO: 7.2 % (ref 5–12)
NEUTROPHILS NFR BLD AUTO: 5.58 10*3/MM3 (ref 1.7–7)
NEUTROPHILS NFR BLD AUTO: 73.8 % (ref 42.7–76)
NITRITE UR QL STRIP: NEGATIVE
NRBC BLD AUTO-RTO: 0 /100 WBC (ref 0–0.2)
PH UR STRIP.AUTO: 7 [PH] (ref 5–8)
PLATELET # BLD AUTO: 196 10*3/MM3 (ref 140–450)
PMV BLD AUTO: 9.4 FL (ref 6–12)
POTASSIUM SERPL-SCNC: 3.8 MMOL/L (ref 3.5–5.2)
PROT SERPL-MCNC: 6.7 G/DL (ref 6–8.5)
PROT UR QL STRIP: NEGATIVE
PROTHROMBIN TIME: 12.9 SECONDS (ref 11.5–14)
RBC # BLD AUTO: 4.81 10*6/MM3 (ref 3.77–5.28)
RBC # UR: ABNORMAL /HPF
REF LAB TEST METHOD: ABNORMAL
SODIUM SERPL-SCNC: 136 MMOL/L (ref 136–145)
SP GR UR STRIP: 1.01 (ref 1–1.03)
SQUAMOUS #/AREA URNS HPF: ABNORMAL /HPF
UROBILINOGEN UR QL STRIP: ABNORMAL
WBC # BLD AUTO: 7.55 10*3/MM3 (ref 3.4–10.8)
WBC UR QL AUTO: ABNORMAL /HPF

## 2021-02-15 PROCEDURE — 36415 COLL VENOUS BLD VENIPUNCTURE: CPT

## 2021-02-15 PROCEDURE — 85610 PROTHROMBIN TIME: CPT

## 2021-02-15 PROCEDURE — C9803 HOPD COVID-19 SPEC COLLECT: HCPCS

## 2021-02-15 PROCEDURE — U0004 COV-19 TEST NON-CDC HGH THRU: HCPCS

## 2021-02-15 PROCEDURE — 81001 URINALYSIS AUTO W/SCOPE: CPT

## 2021-02-15 PROCEDURE — 83036 HEMOGLOBIN GLYCOSYLATED A1C: CPT

## 2021-02-15 PROCEDURE — 87086 URINE CULTURE/COLONY COUNT: CPT

## 2021-02-15 PROCEDURE — 85025 COMPLETE CBC W/AUTO DIFF WBC: CPT

## 2021-02-15 PROCEDURE — 85730 THROMBOPLASTIN TIME PARTIAL: CPT

## 2021-02-15 PROCEDURE — 80053 COMPREHEN METABOLIC PANEL: CPT

## 2021-02-15 PROCEDURE — U0005 INFEC AGEN DETEC AMPLI PROBE: HCPCS

## 2021-02-15 NOTE — PAT
Patient given a prescription for Benzol Peroxide 5% wash during PAT visit.  Instructed them to fill the prescription or  from [facility] pharmacy if was submitted electronically by the surgeon's office.  Verbal and written instructions given regarding proper use of the Benzoyl Peroxide wash given to patient and/or famlily during PAT visit. Patient/family also instructed to complete checklist and return it to Pre-op on the day of surgery.  Patient and/or family verbalized understanding.      Additionally, reinforced with patient to acquire this prescription from the [facility] retail pharmacy before leaving the hospital after PAT visit due to the potential unavailability at local pharmacies.    Patient instructed to drink 20 ounces (or until full) of Gatorade and it needs to be completed 1 hour before given arrival time for procedure (NO RED Gatorade)    Patient verbalized understanding.

## 2021-02-16 LAB
BACTERIA SPEC AEROBE CULT: NORMAL
SARS-COV-2 RNA RESP QL NAA+PROBE: NOT DETECTED

## 2021-02-17 ENCOUNTER — ANESTHESIA EVENT (OUTPATIENT)
Dept: PERIOP | Facility: HOSPITAL | Age: 76
End: 2021-02-17

## 2021-02-17 RX ORDER — FAMOTIDINE 10 MG/ML
20 INJECTION, SOLUTION INTRAVENOUS ONCE
Status: CANCELLED | OUTPATIENT
Start: 2021-02-17 | End: 2021-02-17

## 2021-02-18 ENCOUNTER — ANESTHESIA (OUTPATIENT)
Dept: PERIOP | Facility: HOSPITAL | Age: 76
End: 2021-02-18

## 2021-02-18 ENCOUNTER — HOSPITAL ENCOUNTER (OUTPATIENT)
Facility: HOSPITAL | Age: 76
Discharge: HOME | End: 2021-02-19
Attending: ORTHOPAEDIC SURGERY | Admitting: ORTHOPAEDIC SURGERY

## 2021-02-18 ENCOUNTER — APPOINTMENT (OUTPATIENT)
Dept: GENERAL RADIOLOGY | Facility: HOSPITAL | Age: 76
End: 2021-02-18

## 2021-02-18 DIAGNOSIS — Z96.612 STATUS POST TOTAL SHOULDER ARTHROPLASTY, LEFT: Primary | ICD-10-CM

## 2021-02-18 PROBLEM — M19.012 GLENOHUMERAL ARTHRITIS, LEFT: Status: ACTIVE | Noted: 2021-02-18

## 2021-02-18 PROCEDURE — A9270 NON-COVERED ITEM OR SERVICE: HCPCS | Performed by: ORTHOPAEDIC SURGERY

## 2021-02-18 PROCEDURE — C1776 JOINT DEVICE (IMPLANTABLE): HCPCS | Performed by: ORTHOPAEDIC SURGERY

## 2021-02-18 PROCEDURE — 25010000002 NEOSTIGMINE 10 MG/10ML SOLUTION: Performed by: NURSE ANESTHETIST, CERTIFIED REGISTERED

## 2021-02-18 PROCEDURE — 76942 ECHO GUIDE FOR BIOPSY: CPT | Performed by: ORTHOPAEDIC SURGERY

## 2021-02-18 PROCEDURE — 94799 UNLISTED PULMONARY SVC/PX: CPT

## 2021-02-18 PROCEDURE — 73020 X-RAY EXAM OF SHOULDER: CPT

## 2021-02-18 PROCEDURE — G0378 HOSPITAL OBSERVATION PER HR: HCPCS

## 2021-02-18 PROCEDURE — 63710000001 LOSARTAN 50 MG TABLET: Performed by: INTERNAL MEDICINE

## 2021-02-18 PROCEDURE — 25010000002 CEFAZOLIN PER 500 MG: Performed by: ORTHOPAEDIC SURGERY

## 2021-02-18 PROCEDURE — 25010000002 DEXAMETHASONE PER 1 MG: Performed by: NURSE ANESTHETIST, CERTIFIED REGISTERED

## 2021-02-18 PROCEDURE — C1713 ANCHOR/SCREW BN/BN,TIS/BN: HCPCS | Performed by: ORTHOPAEDIC SURGERY

## 2021-02-18 PROCEDURE — 25010000002 ROPIVACAINE PER 1 MG: Performed by: ORTHOPAEDIC SURGERY

## 2021-02-18 PROCEDURE — 25010000002 ROPIVACAINE PER 1 MG: Performed by: NURSE ANESTHETIST, CERTIFIED REGISTERED

## 2021-02-18 PROCEDURE — 25010000002 PROPOFOL 10 MG/ML EMULSION: Performed by: NURSE ANESTHETIST, CERTIFIED REGISTERED

## 2021-02-18 PROCEDURE — A9270 NON-COVERED ITEM OR SERVICE: HCPCS | Performed by: INTERNAL MEDICINE

## 2021-02-18 PROCEDURE — 63710000001 FAMOTIDINE 20 MG TABLET: Performed by: ANESTHESIOLOGY

## 2021-02-18 PROCEDURE — 25010000002 ONDANSETRON PER 1 MG: Performed by: NURSE ANESTHETIST, CERTIFIED REGISTERED

## 2021-02-18 PROCEDURE — 25010000002 FENTANYL CITRATE (PF) 100 MCG/2ML SOLUTION: Performed by: NURSE ANESTHETIST, CERTIFIED REGISTERED

## 2021-02-18 PROCEDURE — L3670 SO ACRO/CLAV CAN WEB PRE OTS: HCPCS | Performed by: ORTHOPAEDIC SURGERY

## 2021-02-18 PROCEDURE — 25010000003 LIDOCAINE 1 % SOLUTION: Performed by: NURSE ANESTHETIST, CERTIFIED REGISTERED

## 2021-02-18 PROCEDURE — 63710000001 PREGABALIN 75 MG CAPSULE: Performed by: ORTHOPAEDIC SURGERY

## 2021-02-18 PROCEDURE — 25010000002 VANCOMYCIN 1 G RECONSTITUTED SOLUTION: Performed by: ORTHOPAEDIC SURGERY

## 2021-02-18 PROCEDURE — 25010000003 CEFAZOLIN IN DEXTROSE 2-4 GM/100ML-% SOLUTION: Performed by: ORTHOPAEDIC SURGERY

## 2021-02-18 PROCEDURE — 63710000001 ACETAMINOPHEN 500 MG TABLET: Performed by: ORTHOPAEDIC SURGERY

## 2021-02-18 PROCEDURE — 63710000001 POVIDONE-IODINE 10 % SOLUTION 30 ML BOTTLE: Performed by: ORTHOPAEDIC SURGERY

## 2021-02-18 PROCEDURE — A9270 NON-COVERED ITEM OR SERVICE: HCPCS | Performed by: ANESTHESIOLOGY

## 2021-02-18 DEVICE — USP II HUMERAL HEAD 42/17
Type: IMPLANTABLE DEVICE | Site: SHOULDER | Status: FUNCTIONAL
Brand: ARTHREX®

## 2021-02-18 DEVICE — UNIVERS APEX SUTURE KIT
Type: IMPLANTABLE DEVICE | Site: SHOULDER | Status: FUNCTIONAL
Brand: ARTHREX®

## 2021-02-18 DEVICE — CMT BONE SIMPLEX/P FULL DOSE 10/PK: Type: IMPLANTABLE DEVICE | Site: SHOULDER | Status: FUNCTIONAL

## 2021-02-18 DEVICE — ABSORBABLE HEMOSTAT (OXIDIZED REGENERATED CELLULOSE, U.S.P.)
Type: IMPLANTABLE DEVICE | Site: SHOULDER | Status: FUNCTIONAL
Brand: SURGICEL

## 2021-02-18 DEVICE — UNIVERS APEX HUMERAL STEM, SIZE 5
Type: IMPLANTABLE DEVICE | Site: SHOULDER | Status: FUNCTIONAL
Brand: ARTHREX®

## 2021-02-18 DEVICE — IMPLANTABLE DEVICE
Type: IMPLANTABLE DEVICE | Site: SHOULDER | Status: FUNCTIONAL
Brand: AEQUALIS™ PERFORM+

## 2021-02-18 RX ORDER — NEOSTIGMINE METHYLSULFATE 1 MG/ML
INJECTION, SOLUTION INTRAVENOUS AS NEEDED
Status: DISCONTINUED | OUTPATIENT
Start: 2021-02-18 | End: 2021-02-18 | Stop reason: SURG

## 2021-02-18 RX ORDER — SODIUM CHLORIDE, SODIUM LACTATE, POTASSIUM CHLORIDE, CALCIUM CHLORIDE 600; 310; 30; 20 MG/100ML; MG/100ML; MG/100ML; MG/100ML
9 INJECTION, SOLUTION INTRAVENOUS CONTINUOUS
Status: DISCONTINUED | OUTPATIENT
Start: 2021-02-18 | End: 2021-02-18

## 2021-02-18 RX ORDER — MIDAZOLAM HYDROCHLORIDE 1 MG/ML
1 INJECTION INTRAMUSCULAR; INTRAVENOUS
Status: DISCONTINUED | OUTPATIENT
Start: 2021-02-18 | End: 2021-02-18 | Stop reason: HOSPADM

## 2021-02-18 RX ORDER — OXYCODONE HYDROCHLORIDE 5 MG/1
5 TABLET ORAL EVERY 4 HOURS PRN
Status: DISCONTINUED | OUTPATIENT
Start: 2021-02-18 | End: 2021-02-19 | Stop reason: HOSPADM

## 2021-02-18 RX ORDER — ACETAMINOPHEN 650 MG/1
650 SUPPOSITORY RECTAL EVERY 4 HOURS PRN
Status: DISCONTINUED | OUTPATIENT
Start: 2021-02-18 | End: 2021-02-19 | Stop reason: HOSPADM

## 2021-02-18 RX ORDER — GLYCOPYRROLATE 0.2 MG/ML
INJECTION INTRAMUSCULAR; INTRAVENOUS AS NEEDED
Status: DISCONTINUED | OUTPATIENT
Start: 2021-02-18 | End: 2021-02-18 | Stop reason: SURG

## 2021-02-18 RX ORDER — SODIUM CHLORIDE 0.9 % (FLUSH) 0.9 %
10 SYRINGE (ML) INJECTION EVERY 12 HOURS SCHEDULED
Status: DISCONTINUED | OUTPATIENT
Start: 2021-02-18 | End: 2021-02-18

## 2021-02-18 RX ORDER — ACETAMINOPHEN 650 MG
TABLET, EXTENDED RELEASE ORAL AS NEEDED
Status: DISCONTINUED | OUTPATIENT
Start: 2021-02-18 | End: 2021-02-18 | Stop reason: HOSPADM

## 2021-02-18 RX ORDER — ONDANSETRON 2 MG/ML
4 INJECTION INTRAMUSCULAR; INTRAVENOUS EVERY 6 HOURS PRN
Status: DISCONTINUED | OUTPATIENT
Start: 2021-02-18 | End: 2021-02-19 | Stop reason: HOSPADM

## 2021-02-18 RX ORDER — ASPIRIN 81 MG/1
81 TABLET ORAL DAILY
Status: DISCONTINUED | OUTPATIENT
Start: 2021-02-19 | End: 2021-02-19 | Stop reason: HOSPADM

## 2021-02-18 RX ORDER — ACETAMINOPHEN 500 MG
1000 TABLET ORAL ONCE
Status: COMPLETED | OUTPATIENT
Start: 2021-02-18 | End: 2021-02-18

## 2021-02-18 RX ORDER — FAMOTIDINE 10 MG/ML
20 INJECTION, SOLUTION INTRAVENOUS ONCE
Status: DISCONTINUED | OUTPATIENT
Start: 2021-02-18 | End: 2021-02-18

## 2021-02-18 RX ORDER — LOSARTAN POTASSIUM 50 MG/1
50 TABLET ORAL NIGHTLY
Status: DISCONTINUED | OUTPATIENT
Start: 2021-02-18 | End: 2021-02-19 | Stop reason: HOSPADM

## 2021-02-18 RX ORDER — PROMETHAZINE HYDROCHLORIDE 25 MG/1
25 TABLET ORAL ONCE AS NEEDED
Status: DISCONTINUED | OUTPATIENT
Start: 2021-02-18 | End: 2021-02-18 | Stop reason: HOSPADM

## 2021-02-18 RX ORDER — SODIUM CHLORIDE 0.9 % (FLUSH) 0.9 %
10 SYRINGE (ML) INJECTION AS NEEDED
Status: DISCONTINUED | OUTPATIENT
Start: 2021-02-18 | End: 2021-02-18

## 2021-02-18 RX ORDER — CEFAZOLIN SODIUM 2 G/100ML
2 INJECTION, SOLUTION INTRAVENOUS EVERY 8 HOURS
Status: COMPLETED | OUTPATIENT
Start: 2021-02-18 | End: 2021-02-19

## 2021-02-18 RX ORDER — ONDANSETRON 2 MG/ML
INJECTION INTRAMUSCULAR; INTRAVENOUS AS NEEDED
Status: DISCONTINUED | OUTPATIENT
Start: 2021-02-18 | End: 2021-02-18 | Stop reason: SURG

## 2021-02-18 RX ORDER — ONDANSETRON 4 MG/1
4 TABLET, FILM COATED ORAL EVERY 6 HOURS PRN
Status: DISCONTINUED | OUTPATIENT
Start: 2021-02-18 | End: 2021-02-19 | Stop reason: HOSPADM

## 2021-02-18 RX ORDER — LIDOCAINE HYDROCHLORIDE 10 MG/ML
0.5 INJECTION, SOLUTION EPIDURAL; INFILTRATION; INTRACAUDAL; PERINEURAL ONCE AS NEEDED
Status: COMPLETED | OUTPATIENT
Start: 2021-02-18 | End: 2021-02-18

## 2021-02-18 RX ORDER — MAGNESIUM HYDROXIDE 1200 MG/15ML
LIQUID ORAL AS NEEDED
Status: DISCONTINUED | OUTPATIENT
Start: 2021-02-18 | End: 2021-02-18 | Stop reason: HOSPADM

## 2021-02-18 RX ORDER — FAMOTIDINE 20 MG/1
20 TABLET, FILM COATED ORAL ONCE
Status: DISCONTINUED | OUTPATIENT
Start: 2021-02-18 | End: 2021-02-18 | Stop reason: HOSPADM

## 2021-02-18 RX ORDER — HYDROMORPHONE HCL 110MG/55ML
0.5 PATIENT CONTROLLED ANALGESIA SYRINGE INTRAVENOUS
Status: DISCONTINUED | OUTPATIENT
Start: 2021-02-18 | End: 2021-02-19 | Stop reason: HOSPADM

## 2021-02-18 RX ORDER — FENTANYL CITRATE 50 UG/ML
50 INJECTION, SOLUTION INTRAMUSCULAR; INTRAVENOUS
Status: DISCONTINUED | OUTPATIENT
Start: 2021-02-18 | End: 2021-02-18 | Stop reason: HOSPADM

## 2021-02-18 RX ORDER — PREGABALIN 75 MG/1
75 CAPSULE ORAL ONCE
Status: COMPLETED | OUTPATIENT
Start: 2021-02-18 | End: 2021-02-18

## 2021-02-18 RX ORDER — ACETAMINOPHEN 325 MG/1
650 TABLET ORAL EVERY 4 HOURS PRN
Status: DISCONTINUED | OUTPATIENT
Start: 2021-02-18 | End: 2021-02-19 | Stop reason: HOSPADM

## 2021-02-18 RX ORDER — DEXAMETHASONE SODIUM PHOSPHATE 4 MG/ML
INJECTION, SOLUTION INTRA-ARTICULAR; INTRALESIONAL; INTRAMUSCULAR; INTRAVENOUS; SOFT TISSUE AS NEEDED
Status: DISCONTINUED | OUTPATIENT
Start: 2021-02-18 | End: 2021-02-18 | Stop reason: SURG

## 2021-02-18 RX ORDER — LIDOCAINE HYDROCHLORIDE 10 MG/ML
0.5 INJECTION, SOLUTION EPIDURAL; INFILTRATION; INTRACAUDAL; PERINEURAL ONCE AS NEEDED
Status: DISCONTINUED | OUTPATIENT
Start: 2021-02-18 | End: 2021-02-18

## 2021-02-18 RX ORDER — NALOXONE HCL 0.4 MG/ML
0.1 VIAL (ML) INJECTION
Status: DISCONTINUED | OUTPATIENT
Start: 2021-02-18 | End: 2021-02-19 | Stop reason: HOSPADM

## 2021-02-18 RX ORDER — LIDOCAINE HYDROCHLORIDE 10 MG/ML
INJECTION, SOLUTION INFILTRATION; PERINEURAL AS NEEDED
Status: DISCONTINUED | OUTPATIENT
Start: 2021-02-18 | End: 2021-02-18 | Stop reason: SURG

## 2021-02-18 RX ORDER — MIDAZOLAM HYDROCHLORIDE 1 MG/ML
0.5 INJECTION INTRAMUSCULAR; INTRAVENOUS
Status: DISCONTINUED | OUTPATIENT
Start: 2021-02-18 | End: 2021-02-18

## 2021-02-18 RX ORDER — BUPIVACAINE HYDROCHLORIDE 2.5 MG/ML
INJECTION, SOLUTION EPIDURAL; INFILTRATION; INTRACAUDAL
Status: COMPLETED | OUTPATIENT
Start: 2021-02-18 | End: 2021-02-18

## 2021-02-18 RX ORDER — CEFAZOLIN SODIUM 2 G/100ML
2 INJECTION, SOLUTION INTRAVENOUS ONCE
Status: COMPLETED | OUTPATIENT
Start: 2021-02-18 | End: 2021-02-18

## 2021-02-18 RX ORDER — PROPOFOL 10 MG/ML
VIAL (ML) INTRAVENOUS AS NEEDED
Status: DISCONTINUED | OUTPATIENT
Start: 2021-02-18 | End: 2021-02-18 | Stop reason: SURG

## 2021-02-18 RX ORDER — PROMETHAZINE HYDROCHLORIDE 25 MG/1
25 SUPPOSITORY RECTAL ONCE AS NEEDED
Status: DISCONTINUED | OUTPATIENT
Start: 2021-02-18 | End: 2021-02-18 | Stop reason: HOSPADM

## 2021-02-18 RX ORDER — FENTANYL CITRATE 50 UG/ML
INJECTION, SOLUTION INTRAMUSCULAR; INTRAVENOUS
Status: COMPLETED | OUTPATIENT
Start: 2021-02-18 | End: 2021-02-18

## 2021-02-18 RX ORDER — EPHEDRINE SULFATE 50 MG/ML
INJECTION, SOLUTION INTRAVENOUS AS NEEDED
Status: DISCONTINUED | OUTPATIENT
Start: 2021-02-18 | End: 2021-02-18 | Stop reason: SURG

## 2021-02-18 RX ORDER — MIDAZOLAM HYDROCHLORIDE 1 MG/ML
0.5 INJECTION INTRAMUSCULAR; INTRAVENOUS
Status: DISCONTINUED | OUTPATIENT
Start: 2021-02-18 | End: 2021-02-18 | Stop reason: HOSPADM

## 2021-02-18 RX ORDER — ATRACURIUM BESYLATE 10 MG/ML
INJECTION, SOLUTION INTRAVENOUS AS NEEDED
Status: DISCONTINUED | OUTPATIENT
Start: 2021-02-18 | End: 2021-02-18 | Stop reason: SURG

## 2021-02-18 RX ORDER — VANCOMYCIN HYDROCHLORIDE 1 G/20ML
INJECTION, POWDER, LYOPHILIZED, FOR SOLUTION INTRAVENOUS AS NEEDED
Status: DISCONTINUED | OUTPATIENT
Start: 2021-02-18 | End: 2021-02-18 | Stop reason: HOSPADM

## 2021-02-18 RX ORDER — SODIUM CHLORIDE 450 MG/100ML
50 INJECTION, SOLUTION INTRAVENOUS CONTINUOUS
Status: DISCONTINUED | OUTPATIENT
Start: 2021-02-18 | End: 2021-02-19 | Stop reason: HOSPADM

## 2021-02-18 RX ORDER — FAMOTIDINE 20 MG/1
20 TABLET, FILM COATED ORAL ONCE
Status: COMPLETED | OUTPATIENT
Start: 2021-02-18 | End: 2021-02-18

## 2021-02-18 RX ORDER — MIDAZOLAM HYDROCHLORIDE 1 MG/ML
1 INJECTION INTRAMUSCULAR; INTRAVENOUS
Status: DISCONTINUED | OUTPATIENT
Start: 2021-02-18 | End: 2021-02-18

## 2021-02-18 RX ORDER — LABETALOL HYDROCHLORIDE 5 MG/ML
10 INJECTION, SOLUTION INTRAVENOUS EVERY 4 HOURS PRN
Status: DISCONTINUED | OUTPATIENT
Start: 2021-02-18 | End: 2021-02-19 | Stop reason: HOSPADM

## 2021-02-18 RX ORDER — OXYCODONE HYDROCHLORIDE 5 MG/1
10 TABLET ORAL EVERY 4 HOURS PRN
Status: DISCONTINUED | OUTPATIENT
Start: 2021-02-18 | End: 2021-02-19 | Stop reason: HOSPADM

## 2021-02-18 RX ADMIN — Medication 1000 MG: at 14:59

## 2021-02-18 RX ADMIN — DEXAMETHASONE SODIUM PHOSPHATE 8 MG: 4 INJECTION, SOLUTION INTRA-ARTICULAR; INTRALESIONAL; INTRAMUSCULAR; INTRAVENOUS; SOFT TISSUE at 13:45

## 2021-02-18 RX ADMIN — ROPIVACAINE HYDROCHLORIDE 6 ML/HR: 5 INJECTION, SOLUTION EPIDURAL; INFILTRATION; PERINEURAL at 15:54

## 2021-02-18 RX ADMIN — FENTANYL CITRATE 100 MCG: 50 INJECTION, SOLUTION INTRAMUSCULAR; INTRAVENOUS at 11:48

## 2021-02-18 RX ADMIN — FAMOTIDINE 20 MG: 20 TABLET, FILM COATED ORAL at 10:35

## 2021-02-18 RX ADMIN — LIDOCAINE HYDROCHLORIDE 50 MG: 10 INJECTION, SOLUTION INFILTRATION; PERINEURAL at 13:28

## 2021-02-18 RX ADMIN — Medication 1000 MG: at 13:47

## 2021-02-18 RX ADMIN — SODIUM CHLORIDE 50 ML/HR: 4.5 INJECTION, SOLUTION INTRAVENOUS at 16:57

## 2021-02-18 RX ADMIN — CEFAZOLIN 2 G: 10 INJECTION, POWDER, FOR SOLUTION INTRAVENOUS at 20:32

## 2021-02-18 RX ADMIN — ACETAMINOPHEN 1000 MG: 500 TABLET ORAL at 10:35

## 2021-02-18 RX ADMIN — EPHEDRINE SULFATE 10 MG: 50 INJECTION, SOLUTION INTRAVENOUS at 14:18

## 2021-02-18 RX ADMIN — EPHEDRINE SULFATE 10 MG: 50 INJECTION, SOLUTION INTRAVENOUS at 14:15

## 2021-02-18 RX ADMIN — GLYCOPYRROLATE 0.4 MG: 0.4 INJECTION INTRAMUSCULAR; INTRAVENOUS at 15:19

## 2021-02-18 RX ADMIN — LIDOCAINE HYDROCHLORIDE 0.5 ML: 10 INJECTION, SOLUTION EPIDURAL; INFILTRATION; INTRACAUDAL; PERINEURAL at 10:36

## 2021-02-18 RX ADMIN — BUPIVACAINE HYDROCHLORIDE 15 ML: 2.5 INJECTION, SOLUTION EPIDURAL; INFILTRATION; INTRACAUDAL at 11:48

## 2021-02-18 RX ADMIN — NEOSTIGMINE 3 MG: 1 INJECTION INTRAVENOUS at 15:19

## 2021-02-18 RX ADMIN — ATRACURIUM BESYLATE 30 MG: 10 INJECTION, SOLUTION INTRAVENOUS at 13:28

## 2021-02-18 RX ADMIN — EPHEDRINE SULFATE 10 MG: 50 INJECTION, SOLUTION INTRAVENOUS at 13:45

## 2021-02-18 RX ADMIN — LOSARTAN POTASSIUM 50 MG: 50 TABLET, FILM COATED ORAL at 20:32

## 2021-02-18 RX ADMIN — PROPOFOL 110 MG: 10 INJECTION, EMULSION INTRAVENOUS at 13:28

## 2021-02-18 RX ADMIN — CEFAZOLIN SODIUM 2 G: 2 INJECTION, SOLUTION INTRAVENOUS at 13:25

## 2021-02-18 RX ADMIN — PREGABALIN 75 MG: 75 CAPSULE ORAL at 10:35

## 2021-02-18 RX ADMIN — ONDANSETRON 4 MG: 2 INJECTION INTRAMUSCULAR; INTRAVENOUS at 15:18

## 2021-02-18 RX ADMIN — SODIUM CHLORIDE, POTASSIUM CHLORIDE, SODIUM LACTATE AND CALCIUM CHLORIDE 9 ML/HR: 600; 310; 30; 20 INJECTION, SOLUTION INTRAVENOUS at 10:35

## 2021-02-18 NOTE — ANESTHESIA PROCEDURE NOTES
Airway  Urgency: elective    Date/Time: 2/18/2021 1:31 PM  Airway not difficult    General Information and Staff    Patient location during procedure: OR  CRNA: Nora Bob CRNA    Indications and Patient Condition  Indications for airway management: airway protection    Preoxygenated: yes  MILS maintained throughout  Mask difficulty assessment: 2 - vent by mask + OA or adjuvant +/- NMBA    Final Airway Details  Final airway type: endotracheal airway      Successful airway: ETT  Cuffed: yes   Successful intubation technique: direct laryngoscopy  Facilitating devices/methods: intubating stylet  Endotracheal tube insertion site: oral  Blade: Gee  Blade size: 2  ETT size (mm): 7.0  Cormack-Lehane Classification: grade I - full view of glottis  Placement verified by: chest auscultation and capnometry   Cuff volume (mL): 5  Measured from: lips  ETT/EBT  to lips (cm): 21  Number of attempts at approach: 1  Assessment: lips, teeth, and gum same as pre-op and atraumatic intubation    Additional Comments  Pt to OR 19. Pt moved self to OR table. ASA monitors placed. Pre-O2 with 100% Oxygen. SIVI. Atraumatic intubation. +ETCO2, +BBS.

## 2021-02-18 NOTE — ANESTHESIA PREPROCEDURE EVALUATION
Anesthesia Evaluation     Patient summary reviewed and Nursing notes reviewed   NPO Solid Status: > 8 hours  NPO Liquid Status: > 8 hours           Airway   Mallampati: II  TM distance: >3 FB  Neck ROM: limited  Possible difficult intubation  Dental      Pulmonary     breath sounds clear to auscultation  Cardiovascular   Exercise tolerance: good (4-7 METS)    Rhythm: regular  Rate: normal        Neuro/Psych  GI/Hepatic/Renal/Endo      Musculoskeletal     Abdominal    Substance History      OB/GYN          Other                        Anesthesia Plan    ASA 2     general     intravenous induction       ISB for post pain relief

## 2021-02-18 NOTE — PLAN OF CARE
Goal Outcome Evaluation:  Plan of Care Reviewed With: patient  Progress: improving   Pt doing well

## 2021-02-18 NOTE — ANESTHESIA POSTPROCEDURE EVALUATION
Patient: Issa Farmer    Procedure Summary     Date: 02/18/21 Room / Location:  [facility]    Anesthesia Start: 1321 Anesthesia Stop: 1601    Procedure: TOTAL SHOULDER ARTHROPLASTY LEFT WITH AUGMENTED GLENOID (Left Shoulder) Diagnosis:       Glenohumeral arthritis, left      (glenohumeral arthritis Left)    Surgeon: Anderson Muñiz MD Provider: Saul Mcintosh MD    Anesthesia Type: general ASA Status: 2          Anesthesia Type: general    Vitals  Vitals Value Taken Time   BP     Temp     Pulse     Resp     SpO2 90 % 02/18/21 1559   Vitals shown include unvalidated device data.        Post Anesthesia Care and Evaluation    Patient location during evaluation: PACU  Patient participation: complete - patient participated  Level of consciousness: awake and alert  Pain management: adequate  Airway patency: patent  Anesthetic complications: No anesthetic complications  PONV Status: none  Cardiovascular status: hemodynamically stable and acceptable  Respiratory status: nonlabored ventilation, acceptable and nasal cannula  Hydration status: acceptable    Comments: Transported to PACU on O2NC, VSS, pt comfortable.

## 2021-02-18 NOTE — OP NOTE
Operative Report Total Shoulder Replacement    Preoperative Diagnosis: left  shoulder degenerative arthritis, with walch B2 glenoid    Postoperative Diagnosis: Same    Procedure: left Total shoulder arthroplasty    Surgeon: Dr. Anderson Muñiz    Assistant: Ysabel Hendricks PA-C          EBL:    150 cc    Anesthesia: GETA with interscalene brachial plexus block    Implants:  Arthrex Total Shoulder Arthroplasty System  1) Humeral Stem:  Size 5 cemented  2) Head:  Offset humeral head, size 42  mm x  17 mm         Tornier Augmented glenoid  Size small, 25 degree posterior augment    Indications: Patient   is a 74yo  female with left  shoulder degenerative arthritis.  Risks and benefits of operative versus nonoperative management discussed.  Patient elected to proceed with surgery. Informed consent obtained.       Description: Patient was met in the preoperative holding area and confirmed by name, medical record number, .  The operative site was correctly identified. Patient was then met by anesthesia and cleared for surgery.  An interscalene brachial plexus block was then performed.  Patient was then brought to the operating room suite and and placed supine on the OR table.  Patient underwent smooth induction with GETA.  Patient then positioned in the beach chair position. Patient’s left shoulder and left upper extremity prepped and draped in sterile fashion. Preoperative prophylactic antibiotic given with 2 g of Ancef.  A timeout was then observed    An approx 8cm skin incision was made centered over the deltopectoral interval.  Dissection carried down to the interval taking the cephalic vein laterally.  Deep retractors were then placed. The biceps tendon was then localized and followed through the rotator interval back to the glenoid and tenotomized.  A subscapularis peel was performed and the shoulder was readily dislocated.      The proximal humerus was cut in a free hand fashion in approximately 30 degrees of  retroversion and the appropriate depth of resection and inclination. We then reamed the humerus distally to accommodate a humeral stem trial. We then used the countersink reamed and then placed an appropriate humeral stem trial.  A humeral head trial was placed and rotated to the appropriate position.        The axillary nerve was then palpated and deep retractors were placed exposing the glenoid. Careful releases of the anterior and inferior capsule were then performed further exposing the glenoid.  The  glenoid was then exposed, the glenoid was reamed appropriately and the  peg holes drilled.  Cement was then mixed, and placed in a pressurized fashion into the peg holes.  An all poly augmented glenoid implant was applied and impacted into position.       We drill holes in the biceps groove to aid in repair of our subscapularis peel    The shoulder was then redislocated and a size 5 humeral stem was placed after a generous portion of cemented was placed in the proximal canal and on the stem .  An offset humeral head was applied and impacted into position.      We then passed our stitches through the subscapularis and tied down our tendon.      We then irrigated with sterile saline.  We then closed the deltopectoral layer with 0-vicryl, the dermal layer with 2-0 vicryl and the skin with interrupted 3-0 nylon.  Vancomycin powder was placed in the wound.  A sterile dressing was applied.      Patient tolerated the procedure well.  They were extubated and placed in a sling.  At the end of the case all sponge and instrument counts were correct x 2.      Plan: Patient will be admitted for observation and pain control.  Antibiotics x 24 hours.   Patient will be seen back in the clinic in approx 10-14 days.

## 2021-02-18 NOTE — PLAN OF CARE
Goal Outcome Evaluation:  Plan of Care Reviewed With: patient, daughter  Progress: improving  Pt recd from PACU. Pt states no pain issues at this time, pt is alert & oriented, pt has a history of a left mastectomy-no BPs or sticks in left arm, will continue to monitor

## 2021-02-18 NOTE — ANESTHESIA PROCEDURE NOTES
Peripheral Block      Patient reassessed immediately prior to procedure    Patient location during procedure: pre-op  Start time: 2/18/2021 11:36 AM  Stop time: 2/18/2021 11:48 AM  Reason for block: at surgeon's request and post-op pain management  Performed by  CRNA: Tyrell Crowell CRNA  Assisted by: Glen Wills CRNA  Preanesthetic Checklist  Completed: patient identified, site marked, surgical consent, pre-op evaluation, timeout performed, IV checked, risks and benefits discussed and monitors and equipment checked  Prep:  Pt Position: right lateral decubitus  Sterile barriers:cap, gloves, mask and sterile barriers  Prep: ChloraPrep  Patient monitoring: blood pressure monitoring, continuous pulse oximetry and EKG  Procedure  Sedation:yes  Performed under: local infiltration  Guidance:ultrasound guided  Images:still images obtained, printed/placed on chart    Laterality:left  Block Type:interscalene  Injection Technique:catheter  Needle Type:Tuohy and echogenic  Needle Gauge:18 G  Resistance on Injection: none  Catheter Size:20 G (20g)  Cath Depth at skin: 9 cm    Medications Used: fentaNYL citrate (PF) (SUBLIMAZE) injection, 100 mcg  bupivacaine PF (MARCAINE) 0.25 % injection, 15 mL  Med admintered at 2/18/2021 11:48 AM      Medications  Preservative Free Saline:5ml    Post Assessment  Injection Assessment: negative aspiration for heme, no paresthesia on injection and incremental injection  Patient Tolerance:comfortable throughout block  Complications:no  Additional Notes  Procedure:                 The pt was placed in semifowlers position with a slight tilt of the thorax contralateral to the insertion site.  The Insertion Site was prepped and draped in sterile fashion.  The pt was anesthetized with  IV Sedation( see meds) and  Skin and cutaneous tissue was infiltrated and anesthetized with 1% Lidocaine 3 mls via a 25g needle.  Utilizing ultrasound guidance, a BBraun 2 inch 18 g Contiplex echogenic touhy  needle was advanced in-plane.  Hydro dissection of tissue was achieved with Normal saline. Major vessels(carotid and Internal Jugular) where visualized as the brachial plexus was approached at the approximate level of C-7/ T-1.  Cervical 5 and Branches of Cervical 6 nerve roots where visualized and the needle tip was placed posterior at the level of C-6 roots.  LA spread was visualized and injection was made incrementally every 5 mls with aspiration. Injection pressure was normal or little, there was no intraneural injection, no vascular injection.      The BBraun 20 g wire stylet  catheter was then placed under US guidance on the posterior aspect of the Brachial Plexus.  Location of catheter was confirmed with NS injection visualized with US . The tuohy was then removed and the skin was sealed with Skin AFix at catheter insertion site.  Skin was prepped with mastisol and the labeled catheter  was secured with steristrips and a CHG tegaderm. Thank You.

## 2021-02-18 NOTE — H&P
Patient Name: Issa Farmer  MRN: [id number]  : 1945  DOS: 2021    Attending: Anderson Muñiz MD    Primary Care Provider: Yan Unger DO      Chief complaint: Left shoulder pain    Subjective   Patient is a pleasant 75 y.o. female presented for scheduled surgery by Dr. Muñiz.  She underwent left   total shoulder arthroplasty under GA and a block, tolerated surgery well, was admitted for further management.  Seen in her room postoperatively, doing fairly well.  No complains of nausea, vomiting, or shortness of breath.  No difficulty swallowing.  She has not yet ambulated as she has just arrived from PACU.  She is known to me from her previous admit for right shoulder arthroplasty that was back in October of this year.  She did well postoperatively and was discharged home on postop day 1 in good condition.  She is motivated to participate with therapy and go home tomorrow.    Allergies:  No Known Allergies    Meds:  Medications Prior to Admission   Medication Sig Dispense Refill Last Dose   • acetaminophen (TYLENOL) 325 MG tablet Take 2 tablets by mouth Every 6 (Six) Hours. 60 tablet 0 2021 at 1300   • benzoyl peroxide 5 % external liquid Use as directed by provider beginning 2 days prior to surgery. 148 g 0 2021 at 0900   • Cranberry 250 MG tablet Take 1 tablet by mouth Daily.   2021 at Unknown time   • losartan (COZAAR) 50 MG tablet Take 50 mg by mouth Daily.   2021 at 2200   • senna (senna) 8.6 MG tablet Take 1 tablet by mouth Daily.   2021 at Unknown time   • aspirin 81 MG EC tablet Take 81 mg by mouth Daily.   2/15/2021   • docusate sodium 100 MG capsule Take 200 mg by mouth 2 (Two) Times a Day. 60 each 1 2021   • ibuprofen (ADVIL,MOTRIN) 600 MG tablet Take 1 tablet by mouth Every 6 (Six) Hours As Needed for Mild Pain . 30 tablet 0 2021   • ondansetron (ZOFRAN) 4 MG tablet Take 1 tablet by mouth Every 8 (Eight) Hours As Needed for  "Post-Op Nausea. 30 tablet 0          History:   Past Medical History:   Diagnosis Date   • Arthritis    • Cancer (CMS/HCC)     Left breast cancer 1996   • Hypertension    • Shoulder pain     right     Past Surgical History:   Procedure Laterality Date   • EXPLORATORY LAPAROTOMY, TOTAL ABDOMINAL HYSTERECTOMY WITH TUMOR DEBULKING N/A 10/8/2018    Procedure: EXPLORATORY LAPAROTOMY, TOTAL ABDOMINAL HYSTERECTOMY, BILATERAL SALPINGO-OOPHORECTOMY;  Surgeon: Deysi Watts MD;  Location:  [facility];  Service: Gynecology   • HYSTERECTOMY     • MASTECTOMY Left    • REPLACEMENT TOTAL KNEE Left    • TOTAL SHOULDER ARTHROPLASTY Right 10/22/2020    Procedure: TOTAL SHOULDER ARTHROPASTY RIGHT;  Surgeon: Anderson Muñiz MD;  Location:  [facility];  Service: Orthopedics;  Laterality: Right;   • TUBAL ABDOMINAL LIGATION       History reviewed. No pertinent family history.  Social History     Tobacco Use   • Smoking status: Never Smoker   • Smokeless tobacco: Never Used   Substance Use Topics   • Alcohol use: No   • Drug use: No   Patient has 4 children.  She lives with her granddaughter.  Has a grocery store.    Review of Systems  Pertinent items are noted in HPI, all other systems reviewed and negative    Vital Signs  /60 (BP Location: Right arm, Patient Position: Lying)   Pulse 91   Temp 97.5 °F (36.4 °C) (Temporal)   Resp 15   Ht 167.6 cm (66\")   Wt 77.3 kg (170 lb 6.4 oz)   SpO2 94%   BMI 27.50 kg/m²     Physical Exam:    General Appearance:    Alert, cooperative, in no acute distress   Head:    Normocephalic, without obvious abnormality, atraumatic   Eyes:            Lids and lashes normal, conjunctivae and sclerae normal, no   icterus, no pallor, corneas clear,    Ears:    Ears appear intact with no abnormalities noted   Throat:   No oral lesions, no thrush, oral mucosa moist   Neck:   No adenopathy, supple, trachea midline, no thyromegaly         Lungs:     Clear to auscultation,respirations regular, even " and                   unlabored    Heart:    Regular rhythm and normal rate, normal S1 and S2, no      murmur    Abdomen:     Normal bowel sounds, no masses, no organomegaly, soft        non-tender, non-distended, no guarding, no rebound                 tenderness   Genitalia:    Deferred   Extremities:  Left UE in a sling, CDI Aquacel dressing shoulder. Interscalene nerve block cath present.  Distal pulses, cap refill, movements of fingers, wrist, intact.     Pulses:   Pulses palpable and equal bilaterally   Skin:   No bleeding, bruising or rash   Neurologic:   Cranial nerves 2 - 12 grossly intact      I reviewed the patient's new clinical results.       Results from last 7 days   Lab Units 02/15/21  1138   WBC 10*3/mm3 7.55   HEMOGLOBIN g/dL 13.0   HEMATOCRIT % 42.3   PLATELETS 10*3/mm3 196     Results from last 7 days   Lab Units 02/15/21  1138   SODIUM mmol/L 136   POTASSIUM mmol/L 3.8   CHLORIDE mmol/L 99   CO2 mmol/L 33.0*   BUN mg/dL 11   CREATININE mg/dL 0.55*   CALCIUM mg/dL 8.9   BILIRUBIN mg/dL 0.3   ALK PHOS U/L 56   ALT (SGPT) U/L 8   AST (SGOT) U/L 13   GLUCOSE mg/dL 102*     Lab Results   Component Value Date    HGBA1C 5.90 (H) 02/15/2021       Assessment and Plan:       Status post total shoulder arthroplasty, left    HTN (hypertension)    Elevated hemoglobin A1c    Glenohumeral arthritis, left      Plan    1. PT/OT. NWB, left UE, ROM hand, wrist, elbow.  2. Pain control-prns, interscalene nerve block cath with ropivacaine infusion.   3. IS-encourage  4. DVT proph- Mech/ mobilize.  5. Bowel regimen  6. Resume home medications as appropriate  7. Monitor post-op labs  8. DC planning for home.      - Hypertension:  Resume home medications as appropriate, formulary substitution when indicated.  Holding parameters.  Prn medications for elevated blood pressure.    Discussed with patient and family postop management plan.      Dragon disclaimer:  Part of this encounter note is an electronic  transcription/translation of spoken language to printed text. The electronic translation of spoken language may permit erroneous, or at times, nonsensical words or phrases to be inadvertently transcribed; Although I have reviewed the note for such errors, some may still exist.    Higinio Carvalho MD  02/18/21  16:12 EST

## 2021-02-19 VITALS
HEIGHT: 66 IN | RESPIRATION RATE: 16 BRPM | SYSTOLIC BLOOD PRESSURE: 148 MMHG | DIASTOLIC BLOOD PRESSURE: 84 MMHG | OXYGEN SATURATION: 97 % | TEMPERATURE: 98.1 F | HEART RATE: 90 BPM | WEIGHT: 170.4 LBS | BODY MASS INDEX: 27.38 KG/M2

## 2021-02-19 LAB
ANION GAP SERPL CALCULATED.3IONS-SCNC: 7 MMOL/L (ref 5–15)
BASOPHILS # BLD AUTO: 0.02 10*3/MM3 (ref 0–0.2)
BASOPHILS NFR BLD AUTO: 0.2 % (ref 0–1.5)
BUN SERPL-MCNC: 8 MG/DL (ref 8–23)
BUN/CREAT SERPL: 14 (ref 7–25)
CALCIUM SPEC-SCNC: 8.4 MG/DL (ref 8.6–10.5)
CHLORIDE SERPL-SCNC: 102 MMOL/L (ref 98–107)
CO2 SERPL-SCNC: 28 MMOL/L (ref 22–29)
CREAT SERPL-MCNC: 0.57 MG/DL (ref 0.57–1)
DEPRECATED RDW RBC AUTO: 45 FL (ref 37–54)
EOSINOPHIL # BLD AUTO: 0 10*3/MM3 (ref 0–0.4)
EOSINOPHIL NFR BLD AUTO: 0 % (ref 0.3–6.2)
ERYTHROCYTE [DISTWIDTH] IN BLOOD BY AUTOMATED COUNT: 14 % (ref 12.3–15.4)
GFR SERPL CREATININE-BSD FRML MDRD: 103 ML/MIN/1.73
GLUCOSE SERPL-MCNC: 158 MG/DL (ref 65–99)
HCT VFR BLD AUTO: 38.5 % (ref 34–46.6)
HGB BLD-MCNC: 11.8 G/DL (ref 12–15.9)
IMM GRANULOCYTES # BLD AUTO: 0.06 10*3/MM3 (ref 0–0.05)
IMM GRANULOCYTES NFR BLD AUTO: 0.5 % (ref 0–0.5)
LYMPHOCYTES # BLD AUTO: 1.04 10*3/MM3 (ref 0.7–3.1)
LYMPHOCYTES NFR BLD AUTO: 8.3 % (ref 19.6–45.3)
MCH RBC QN AUTO: 27 PG (ref 26.6–33)
MCHC RBC AUTO-ENTMCNC: 30.6 G/DL (ref 31.5–35.7)
MCV RBC AUTO: 88.1 FL (ref 79–97)
MONOCYTES # BLD AUTO: 0.99 10*3/MM3 (ref 0.1–0.9)
MONOCYTES NFR BLD AUTO: 7.9 % (ref 5–12)
NEUTROPHILS NFR BLD AUTO: 10.37 10*3/MM3 (ref 1.7–7)
NEUTROPHILS NFR BLD AUTO: 83.1 % (ref 42.7–76)
NRBC BLD AUTO-RTO: 0 /100 WBC (ref 0–0.2)
PLATELET # BLD AUTO: 198 10*3/MM3 (ref 140–450)
PMV BLD AUTO: 9.6 FL (ref 6–12)
POTASSIUM SERPL-SCNC: 4.1 MMOL/L (ref 3.5–5.2)
RBC # BLD AUTO: 4.37 10*6/MM3 (ref 3.77–5.28)
SODIUM SERPL-SCNC: 137 MMOL/L (ref 136–145)
WBC # BLD AUTO: 12.48 10*3/MM3 (ref 3.4–10.8)

## 2021-02-19 PROCEDURE — 80048 BASIC METABOLIC PNL TOTAL CA: CPT | Performed by: ORTHOPAEDIC SURGERY

## 2021-02-19 PROCEDURE — 97165 OT EVAL LOW COMPLEX 30 MIN: CPT

## 2021-02-19 PROCEDURE — 85025 COMPLETE CBC W/AUTO DIFF WBC: CPT | Performed by: ORTHOPAEDIC SURGERY

## 2021-02-19 PROCEDURE — 63710000001 ACETAMINOPHEN 325 MG TABLET: Performed by: ORTHOPAEDIC SURGERY

## 2021-02-19 PROCEDURE — 97162 PT EVAL MOD COMPLEX 30 MIN: CPT

## 2021-02-19 PROCEDURE — A9270 NON-COVERED ITEM OR SERVICE: HCPCS | Performed by: ORTHOPAEDIC SURGERY

## 2021-02-19 PROCEDURE — A9270 NON-COVERED ITEM OR SERVICE: HCPCS | Performed by: INTERNAL MEDICINE

## 2021-02-19 PROCEDURE — 97110 THERAPEUTIC EXERCISES: CPT

## 2021-02-19 PROCEDURE — 97535 SELF CARE MNGMENT TRAINING: CPT

## 2021-02-19 PROCEDURE — G0378 HOSPITAL OBSERVATION PER HR: HCPCS

## 2021-02-19 PROCEDURE — 63710000001 ASPIRIN 81 MG TABLET DELAYED-RELEASE: Performed by: INTERNAL MEDICINE

## 2021-02-19 PROCEDURE — 25010000002 CEFAZOLIN PER 500 MG: Performed by: ORTHOPAEDIC SURGERY

## 2021-02-19 RX ORDER — POLYETHYLENE GLYCOL 3350 17 G/17G
17 POWDER, FOR SOLUTION ORAL DAILY
Qty: 238 G | Refills: 0 | Status: SHIPPED | OUTPATIENT
Start: 2021-02-19

## 2021-02-19 RX ORDER — OXYCODONE HYDROCHLORIDE 5 MG/1
5 TABLET ORAL EVERY 4 HOURS PRN
Qty: 40 TABLET | Refills: 0 | Status: SHIPPED | OUTPATIENT
Start: 2021-02-19

## 2021-02-19 RX ADMIN — ACETAMINOPHEN 650 MG: 325 TABLET ORAL at 12:39

## 2021-02-19 RX ADMIN — ACETAMINOPHEN 650 MG: 325 TABLET ORAL at 06:21

## 2021-02-19 RX ADMIN — ASPIRIN 81 MG: 81 TABLET, COATED ORAL at 08:18

## 2021-02-19 RX ADMIN — CEFAZOLIN 2 G: 10 INJECTION, POWDER, FOR SOLUTION INTRAVENOUS at 06:18

## 2021-02-19 NOTE — THERAPY DISCHARGE NOTE
Acute Care - Occupational Therapy Discharge   [place]    Patient Name: Issa Farmer  : 1945    MRN: [id number]                              Today's Date: 2021       Admit Date: 2021    Visit Dx:     ICD-10-CM ICD-9-CM   1. Status post total shoulder arthroplasty, left  Z96.612 V43.61     Patient Active Problem List   Diagnosis   • HTN (hypertension)   • Glenohumeral arthritis, right   • Status post total shoulder arthroplasty, right   • Elevated hemoglobin A1c   • Leukocytosis, mild, likely reactive   • Acute postoperative pain   • Glenohumeral arthritis, left   • Status post total shoulder arthroplasty, left     Past Medical History:   Diagnosis Date   • Arthritis    • Cancer (CMS/HCC)     Left breast cancer    • Hypertension    • Shoulder pain     right     Past Surgical History:   Procedure Laterality Date   • EXPLORATORY LAPAROTOMY, TOTAL ABDOMINAL HYSTERECTOMY WITH TUMOR DEBULKING N/A 10/8/2018    Procedure: EXPLORATORY LAPAROTOMY, TOTAL ABDOMINAL HYSTERECTOMY, BILATERAL SALPINGO-OOPHORECTOMY;  Surgeon: Deysi Watts MD;  Location:  [facility];  Service: Gynecology   • HYSTERECTOMY     • MASTECTOMY Left    • REPLACEMENT TOTAL KNEE Left    • TOTAL SHOULDER ARTHROPLASTY Right 10/22/2020    Procedure: TOTAL SHOULDER ARTHROPASTY RIGHT;  Surgeon: Anderson Muñiz MD;  Location:  [facility];  Service: Orthopedics;  Laterality: Right;   • TOTAL SHOULDER ARTHROPLASTY Left 2021    Procedure: TOTAL SHOULDER ARTHROPLASTY LEFT WITH AUGMENTED GLENOID;  Surgeon: Anderson Muñiz MD;  Location:  [facility];  Service: Orthopedics;  Laterality: Left;   • TUBAL ABDOMINAL LIGATION       General Information     Row Name 21 1136          OT Time and Intention    Document Type  evaluation;discharge evaluation/summary  -HK     Mode of Treatment  occupational therapy  -     Row Name 21 1136          General Information    Patient Profile Reviewed  yes  -HK     Prior  Level of Function  independent:;all household mobility;gait;transfer;bed mobility;min assist:;ADL's  -HK     Existing Precautions/Restrictions  fall;non-weight bearing;left;shoulder;other (see comments) NWB L UE; L UE in sling; L interscalene nerve catheter  -HK     Barriers to Rehab  none identified  -HK     Row Name 02/19/21 1136          Occupational Profile    Occupational History/Life Experiences (Occupational Profile)  Daughter with excellent recall from pts prior TSA in october.  -HK     Row Name 02/19/21 1136          Living Environment    Lives With  alone Daughter to assist her  -HK     Row Name 02/19/21 1136          Home Main Entrance    Number of Stairs, Main Entrance  one  -HK     Stair Railings, Main Entrance  none  -HK     Row Name 02/19/21 1136          Stairs Within Home, Primary    Number of Stairs, Within Home, Primary  none  -HK     Stair Railings, Within Home, Primary  none  -HK     Stairs Comment, Within Home, Primary  Pt has walk in shower  -HK     Row Name 02/19/21 1136          Cognition    Orientation Status (Cognition)  oriented x 4  -HK     Row Name 02/19/21 1136          Safety Issues, Functional Mobility    Safety Issues Affecting Function (Mobility)  safety precautions follow-through/compliance;safety precaution awareness  -     Impairments Affecting Function (Mobility)  pain;range of motion (ROM)  -       User Key  (r) = Recorded By, (t) = Taken By, (c) = Cosigned By    Initials Name Provider Type    HK Vianey Sharp, OT Occupational Therapist        Mobility/ADL's     Row Name 02/19/21 1144          Bed Mobility    Comment (Bed Mobility)  Pt received and left UIC. Pt and daughter educated on completion of bed mobility while maintaining shoulder precautions.  -     Row Name 02/19/21 1144          Transfers    Transfers  sit-stand transfer  -     Sit-Stand [place] (Transfers)  modified independence  -     Row Name 02/19/21 1144          Sit-Stand Transfer    Assistive  Device (Sit-Stand Transfers)  cane, straight  -[facility] Name 02/19/21 1144          Functional Mobility    Functional Mobility- Ind. Level  not tested  -[facility] Name 02/19/21 1144          Activities of Daily Living    BADL Assessment/Intervention  bathing;upper body dressing;lower body dressing  -[facility] Name 02/19/21 1144          Mobility    Extremity Weight-bearing Status  left upper extremity  -HK     Left Upper Extremity (Weight-bearing Status)  (S) non weight-bearing (NWB)  -[facility] Name 02/19/21 1144          Bathing Assessment/Intervention    [place] Level (Bathing)  other (see comments);maximum assist (25% patient effort) L axilla  -HK     Position (Bathing)  unsupported sitting  -HK     Comment (Bathing)  Pt and daughter educated on compeltion of L axilla care while maintaining shoulder precautions. Pt requries maxA for completion and daughter with good recall from prior surgery.  -[facility] Name 02/19/21 1144          Upper Body Dressing Assessment/Training    [place] Level (Upper Body Dressing)  doff;don;front opening garment;pull-over garment;maximum assist (25% patient effort)  -HK     Position (Upper Body Dressing)  unsupported sitting  -HK     Comment (Upper Body Dressing)  OT educated pt and daughter on sling management as well as wear and care, shoulder precautions, axilla care, deejay dressing and care of nerve catheter during ADLS. Daughter with excellent teach back on all sling management. OT provided maxA to don pullover garment and front opening garment via deejay dressing technique.  -[facility] Name 02/19/21 1144          Lower Body Dressing Assessment/Training    [place] Level (Lower Body Dressing)  don;pants/bottoms;socks;maximum assist (25% patient effort)  -HK     Position (Lower Body Dressing)  unsupported sitting  -HK       User Key  (r) = Recorded By, (t) = Taken By, (c) = Cosigned By    Initials Name Provider Type    Vianey Cruz, OT Occupational Therapist         Obj/Interventions     [facility] Name 02/19/21 1154          Sensory Assessment (Somatosensory)    Sensory Assessment (Somatosensory)  sensation intact  -[facility] Name 02/19/21 1154          Vision Assessment/Intervention    Visual Impairment/Limitations  WFL  -[facility] Name 02/19/21 1154          Range of Motion Comprehensive    General Range of Motion  no range of motion deficits identified  -     Comment, General Range of Motion  R UE WFL for eval; L UE in sling  -[facility] Name 02/19/21 1154          Strength Comprehensive (MMT)    General Manual Muscle Testing (MMT) Assessment  no strength deficits identified  -     Comment, General Manual Muscle Testing (MMT) Assessment  R UE WFL for eval; L UE in sling  -[facility] Name 02/19/21 1154          Elbow/Forearm (Therapeutic Exercise)    Elbow/Forearm (Therapeutic Exercise)  AROM (active range of motion)  -     Elbow/Forearm AROM (Therapeutic Exercise)  left;flexion;extension;supination;pronation;10 repetitions;sitting  -HK     Row Name 02/19/21 1154          Wrist (Therapeutic Exercise)    Wrist (Therapeutic Exercise)  AROM (active range of motion)  -     Wrist AROM (Therapeutic Exercise)  left;flexion;extension;10 repetitions  -HK     Row Name 02/19/21 1154          Hand (Therapeutic Exercise)    Hand (Therapeutic Exercise)  AROM (active range of motion)  -     Hand AROM/AAROM (Therapeutic Exercise)  left;finger flexion;finger extension;AROM (active range of motion)  -[facility] Name 02/19/21 1154          Balance    Balance Assessment  sitting static balance;sitting dynamic balance;standing static balance  -     Static Sitting Balance  WNL;unsupported;sitting in chair  -     Dynamic Sitting Balance  WFL;unsupported;sitting in chair  -     Static Standing Balance  WFL;unsupported;standing  -     Dynamic Standing Balance  WFL;unsupported;standing  -HK     Row Name 02/19/21 1154          Therapeutic Exercise    Therapeutic Exercise   elbow/forearm;wrist;hand  -HK       User Key  (r) = Recorded By, (t) = Taken By, (c) = Cosigned By    Initials Name Provider Type    Vianey Cruz OT Occupational Therapist        Goals/Plan     Row Name 02/19/21 1156          Transfer Goal 1 (OT)    Activity/Assistive Device (Transfer Goal 1, OT)  sit-to-stand/stand-to-sit  -HK     [place] Level/Cues Needed (Transfer Goal 1, OT)  supervision required  -HK     Time Frame (Transfer Goal 1, OT)  by discharge  -HK     Progress/Outcome (Transfer Goal 1, OT)  goal met  -HK     Row Name 02/19/21 1156          Dressing Goal 1 (OT)    Activity/Device (Dressing Goal 1, OT)  upper body dressing;other (see comments) Daughter will be able to dof/don sling with supervision  -HK     [place]/Cues Needed (Dressing Goal 1, OT)  supervision required  -HK     Time Frame (Dressing Goal 1, OT)  by discharge  -HK     Progress/Outcome (Dressing Goal 1, OT)  goal met  -HK     Row Name 02/19/21 1157          ROM Goal 1 (OT)    ROM Goal 1 (OT)  Pt and daughter will be able to adequately demonstrate L UE HEP per surgeons preference.  -HK     Time Frame (ROM Goal 1, OT)  by discharge  -HK     Progress/Outcome (ROM Goal 1, OT)  goal met  -HK       User Key  (r) = Recorded By, (t) = Taken By, (c) = Cosigned By    Initials Name Provider Type    Vianey Cruz OT Occupational Therapist        Clinical Impression     Row Name 02/19/21 4228          Pain Assessment    Additional Documentation  Pain Scale: Numbers Pre/Post-Treatment (Group)  -HK     Row Name 02/19/21 0519          Pain Scale: Numbers Pre/Post-Treatment    Pretreatment Pain Rating  5/10  -HK     Posttreatment Pain Rating  5/10  -HK     Pain Location - Side  Left  -HK     Pain Location - Orientation  generalized  -HK     Pain Location  shoulder  -HK     Pain Intervention(s)  Ambulation/increased activity;Repositioned  -HK     Row Name 02/19/21 3022          Plan of Care Review    Plan of Care Reviewed With   patient;daughter  -HK     Progress  improving  -HK     Outcome Summary  OT eval complete. OT educated pt and daughter on sling management as well as wear and care, shoulder precautions, axilla care, deejay dressing, and care of nerve catheter during ADLS. Daughter with excellent recall from pts last surgery and able to dof/don sling independently. Pt requires maxA for all axilla care and UBD. Daughter did not complete teach back on axilla care or UBD stating she feels competent from last surgery. Pt completed AROM x10 reps elbow/wrist/hand. All mobility deferred to PT this date. Recommend d/c home with assist from daughter.  -     Row Name 02/19/21 1155          Therapy Assessment/Plan (OT)    Patient/Family Therapy Goal Statement (OT)  Pt would like to improve and return home.  -HK     Rehab Potential (OT)  good, to achieve stated therapy goals  -     Criteria for Skilled Therapeutic Interventions Met (OT)  yes;skilled treatment is necessary  -     Therapy Frequency (OT)  daily  -     Row Name 02/19/21 1159          Therapy Plan Review/Discharge Plan (OT)    Anticipated Discharge Disposition (OT)  home with assist;home with home health  -     Row Name 02/19/21 1155          Vital Signs    Pre Systolic BP Rehab  -- RN cleared for tx; VSS  -HK     O2 Delivery Pre Treatment  room air  -HK     O2 Delivery Intra Treatment  room air  -HK     O2 Delivery Post Treatment  room air  -HK     Pre Patient Position  Sitting  -HK     Intra Patient Position  Standing  -HK     Post Patient Position  Sitting  -     Row Name 02/19/21 1151          Positioning and Restraints    Pre-Treatment Position  sitting in chair/recliner  -HK     Post Treatment Position  chair  -HK     In Chair  notified nsg;call light within reach;sitting;encouraged to call for assist;with family/caregiver  -       User Key  (r) = Recorded By, (t) = Taken By, (c) = Cosigned By    Initials Name Provider Type    Vianey Cruz, OT Occupational  Therapist        Outcome Measures     Row Name 02/19/21 1159          How much help from another is currently needed...    Putting on and taking off regular lower body clothing?  2  -HK     Bathing (including washing, rinsing, and drying)  2  -HK     Toileting (which includes using toilet bed pan or urinal)  3  -HK     Putting on and taking off regular upper body clothing  2  -HK     Taking care of personal grooming (such as brushing teeth)  3  -HK     Eating meals  3  -HK     AM-PAC 6 Clicks Score (OT)  15  -HK     Row Name 02/19/21 1159          Functional Assessment    Outcome Measure Options  AM-PAC 6 Clicks Daily Activity (OT)  -       User Key  (r) = Recorded By, (t) = Taken By, (c) = Cosigned By    Initials Name Provider Type    HK Vianey Sharp, OT Occupational Therapist        Occupational Therapy Education                 Title: PT OT SLP Therapies (Done)     Topic: Occupational Therapy (Done)     Point: ADL training (Done)     Description:   Instruct learner(s) on proper safety adaptation and remediation techniques during self care or transfers.   Instruct in proper use of assistive devices.              Learning Progress Summary           Patient Acceptance, E,TB,D,H, VU,DU by  at 2/19/2021 1159   Family Acceptance, E,TB,D,H, VU,DU by  at 2/19/2021 1159                   Point: Home exercise program (Done)     Description:   Instruct learner(s) on appropriate technique for monitoring, assisting and/or progressing therapeutic exercises/activities.              Learning Progress Summary           Patient Acceptance, E,TB,D,H, VU,DU by  at 2/19/2021 1159   Family Acceptance, E,TB,D,H, VU,DU by  at 2/19/2021 1159                   Point: Precautions (Done)     Description:   Instruct learner(s) on prescribed precautions during self-care and functional transfers.              Learning Progress Summary           Patient Acceptance, E,TB,D,H, VU,DU by  at 2/19/2021 1159   Family Acceptance,  E,TB,D,H, VU,DU by  at 2/19/2021 1159                   Point: Body mechanics (Done)     Description:   Instruct learner(s) on proper positioning and spine alignment during self-care, functional mobility activities and/or exercises.              Learning Progress Summary           Patient Acceptance, E,TB,D,H, VU,DU by  at 2/19/2021 1159   Family Acceptance, E,TB,D,H, VU,DU by  at 2/19/2021 1159                               User Key     Initials Effective Dates Name Provider Type Discipline     03/07/18 -  Vianey Sharp, OT Occupational Therapist OT              OT Recommendation and Plan  Retired Outcome Summary/Treatment Plan (OT)  Anticipated Discharge Disposition (OT): home with assist, home with home health  Therapy Frequency (OT): daily  Plan of Care Review  Plan of Care Reviewed With: patient, daughter  Progress: improving  Outcome Summary: OT eval complete. OT educated pt and daughter on sling management as well as wear and care, shoulder precautions, axilla care, deejay dressing, and care of nerve catheter during ADLS. Daughter with excellent recall from pts last surgery and able to dof/don sling independently. Pt requires maxA for all axilla care and UBD. Daughter did not complete teach back on axilla care or UBD stating she feels competent from last surgery. Pt completed AROM x10 reps elbow/wrist/hand. All mobility deferred to PT this date. Recommend d/c home with assist from daughter.  Plan of Care Reviewed With: patient, daughter  Outcome Summary: OT eval complete. OT educated pt and daughter on sling management as well as wear and care, shoulder precautions, axilla care, deejay dressing, and care of nerve catheter during ADLS. Daughter with excellent recall from pts last surgery and able to dof/don sling independently. Pt requires maxA for all axilla care and UBD. Daughter did not complete teach back on axilla care or UBD stating she feels competent from last surgery. Pt completed AROM x10 reps  elbow/wrist/hand. All mobility deferred to PT this date. Recommend d/c home with assist from daughter.     Time Calculation:   Time Calculation- OT     Row Name 02/19/21 1054             Time Calculation- OT    OT Start Time  1054  -HK      OT Received On  02/19/21  -      OT Goal Re-Cert Due Date  03/01/21  -         Timed Charges    53582 - OT Therapeutic Exercise Minutes  10  -HK      14843 - OT Self Care/Mgmt Minutes  15  -HK        User Key  (r) = Recorded By, (t) = Taken By, (c) = Cosigned By    Initials Name Provider Type     Vianey Sharp OT Occupational Therapist        Therapy Charges for Today     Code Description Service Date Service Provider Modifiers Qty    82603062643 HC OT SELF CARE/MGMT/TRAIN EA 15 MIN 2/19/2021 Vianey Sharp OT GO 1    30572712987 HC OT THER PROC EA 15 MIN 2/19/2021 Vianey Sharp OT GO 1    90173942489 HC OT EVAL LOW COMPLEXITY 3 2/19/2021 Vianey Sharp OT GO 1               Vianey Sharp OT  2/19/2021  t

## 2021-02-19 NOTE — PROGRESS NOTES
Discharge Planning Assessment  [facility]     Patient Name: Issa Farmer  MRN: [id number]  Today's Date: 2021    Admit Date: 2021    Discharge Needs Assessment     Row Name 21 1138       Living Environment    Lives With  alone    Current Living Arrangements  home/apartment/condo    Primary Care Provided by  self    Provides Primary Care For  no one    Family Caregiver if Needed  child(bere), adult    Family Caregiver Names  Schwab,Karen - Relation: Daughter - Home: [phone]    Quality of Family Relationships  helpful;involved;supportive    Able to Return to Prior Arrangements  yes       Resource/Environmental Concerns    Resource/Environmental Concerns  none    Transportation Concerns  car, none       Transition Planning    Patient/Family Anticipates Transition to  home with family    Patient/Family Anticipated Services at Transition  none    Transportation Anticipated  family or friend will provide       Discharge Needs Assessment    Readmission Within the Last 30 Days  no previous admission in last 30 days    Equipment Currently Used at Home  none;other (see comments) Has a rolling walker and bedside commode from her  spouse    Concerns to be Addressed  denies needs/concerns at this time    Anticipated Changes Related to Illness  none    Equipment Needed After Discharge  none    Current Discharge Risk  lives alone        Discharge Plan     Row Name 21 1139       Plan    Plan  Home with assistance from her adult son and daughter    Plan Comments  CM spoke with patient and daughter at bedside. Patient resides in [facility], alone. Patient's son resides 5 min from her home, daughter within 10 minutes. Patient is independent with ADL's. Patient states she has a rolling walker and bedside commode in her home from her  spouse. Patient denies any current home health or outpatient services. Patient declines home health services at this time. Patient states her daughter will  be able to provide assist and is staying at her home for a couple of days. Daughter to provide transportation to home. CM  following.    Final Discharge Disposition Code  01 - home or self-care        Continued Care and Services - Admitted Since 2/18/2021    Coordination has not been started for this encounter.       Expected Discharge Date and Time     Expected Discharge Date Expected Discharge Time    Feb 19, 2021         Demographic Summary     Row Name 02/19/21 1135       General Information    Arrived From  home    Referral Source  physician    Reason for Consult  discharge planning    Preferred Language  English     Used During This Interaction  no    General Information Comments  PCP: Yan Unger       Contact Information    Contact Information Comments  Schwab,Karen - Relation: Daughter - Home: [phone]        Functional Status     Row Name 02/19/21 1138       Functional Status    Usual Activity Tolerance  moderate    Current Activity Tolerance  moderate       Functional Status, IADL    Medications  independent    Meal Preparation  independent    Housekeeping  independent    Laundry  independent    Shopping  independent       Mental Status    General Appearance WDL  WDL       Mental Status Summary    Recent Changes in Mental Status/Cognitive Functioning  no changes       Employment/    Employment Status  retired                Kaye Saldaña RN

## 2021-02-19 NOTE — PLAN OF CARE
Problem: Adult Inpatient Plan of Care  Goal: Plan of Care Review  Outcome: Ongoing, Progressing  Flowsheets  Taken 2/19/2021 0639 by Janine Morse RN  Progress: improving  Outcome Summary: Pt. resting comfortably most of shift. Up x1 assist to BR. Voiding appropriately. Minimal complaints of pain. Arrow infusing at 6mL/hr. SCDs worn. Ice packs in place. 2L NC/RA. VSS.  Taken 2/18/2021 1720 by Margarita Martinez RN  Plan of Care Reviewed With: patient  Goal: Patient-Specific Goal (Individualized)  Outcome: Ongoing, Progressing  Goal: Absence of Hospital-Acquired Illness or Injury  Outcome: Ongoing, Progressing  Intervention: Identify and Manage Fall Risk  Recent Flowsheet Documentation  Taken 2/19/2021 0400 by Janine Morse RN  Safety Promotion/Fall Prevention:   activity supervised   assistive device/personal items within reach   clutter free environment maintained   elopement precautions   fall prevention program maintained   gait belt   nonskid shoes/slippers when out of bed   room organization consistent   safety round/check completed   toileting scheduled  Taken 2/19/2021 0200 by Janine Morse RN  Safety Promotion/Fall Prevention:   activity supervised   assistive device/personal items within reach   clutter free environment maintained   elopement precautions   fall prevention program maintained   gait belt   nonskid shoes/slippers when out of bed   room organization consistent   safety round/check completed   toileting scheduled  Taken 2/19/2021 0024 by Janine Morse RN  Safety Promotion/Fall Prevention:   activity supervised   assistive device/personal items within reach   clutter free environment maintained   fall prevention program maintained   gait belt   elopement precautions   nonskid shoes/slippers when out of bed   room organization consistent   safety round/check completed   toileting scheduled  Taken 2/18/2021 2200 by Janine Morse RN  Safety Promotion/Fall Prevention:    activity supervised   assistive device/personal items within reach   clutter free environment maintained   elopement precautions   fall prevention program maintained   gait belt   nonskid shoes/slippers when out of bed   room organization consistent   toileting scheduled   safety round/check completed  Intervention: Prevent Skin Injury  Recent Flowsheet Documentation  Taken 2/19/2021 0400 by Janine Morse RN  Body Position: supine  Taken 2/19/2021 0200 by Janine Morse RN  Body Position: supine  Taken 2/19/2021 0024 by Janine Morse RN  Body Position: supine  Taken 2/18/2021 2200 by Janine Morse RN  Body Position: supine  Intervention: Prevent and Manage VTE (venous thromboembolism) Risk  Recent Flowsheet Documentation  Taken 2/19/2021 0400 by Janine Morse RN  VTE Prevention/Management:   bilateral   sequential compression devices on  Taken 2/19/2021 0200 by Janine Morse RN  VTE Prevention/Management:   bilateral   sequential compression devices on  Taken 2/19/2021 0024 by Janine Morse RN  VTE Prevention/Management:   bilateral   sequential compression devices on  Taken 2/18/2021 2200 by Janine Morse RN  VTE Prevention/Management:   bilateral   sequential compression devices on  Goal: Optimal Comfort and Wellbeing  Outcome: Ongoing, Progressing  Intervention: Provide Person-Centered Care  Recent Flowsheet Documentation  Taken 2/18/2021 2058 by Janine Morse RN  Trust Relationship/Rapport:   care explained   thoughts/feelings acknowledged  Goal: Readiness for Transition of Care  Outcome: Ongoing, Progressing     Problem: Bleeding (Shoulder Arthroplasty)  Goal: Absence of Bleeding  Outcome: Ongoing, Progressing     Problem: Bowel Elimination Impaired (Shoulder Arthroplasty)  Goal: Effective Bowel Elimination  Outcome: Ongoing, Progressing     Problem: Joint Function Impaired (Shoulder Arthroplasty)  Goal: Optimal Functional Ability  Outcome: Ongoing, Progressing  Intervention:  Protect Joint Integrity  Recent Flowsheet Documentation  Taken 2/19/2021 0400 by aJnine Morse RN  Positioning/Transfer Devices:   pillows   in use  Taken 2/19/2021 0200 by Janine Morse RN  Positioning/Transfer Devices:   pillows   in use  Taken 2/19/2021 0024 by Janine Morse RN  Positioning/Transfer Devices:   pillows   in use  Taken 2/18/2021 2200 by Janine Morse RN  Positioning/Transfer Devices:   pillows   in use     Problem: Infection (Shoulder Arthroplasty)  Goal: Absence of Infection Signs and Symptoms  Outcome: Ongoing, Progressing     Problem: Ongoing Anesthesia Effects (Shoulder Arthroplasty)  Goal: Anesthesia/Sedation Recovery  Outcome: Ongoing, Progressing  Intervention: Optimize Anesthesia Recovery  Recent Flowsheet Documentation  Taken 2/19/2021 0400 by Janine Morse RN  Safety Promotion/Fall Prevention:   activity supervised   assistive device/personal items within reach   clutter free environment maintained   elopement precautions   fall prevention program maintained   gait belt   nonskid shoes/slippers when out of bed   room organization consistent   safety round/check completed   toileting scheduled  Taken 2/19/2021 0200 by Janine Morse RN  Safety Promotion/Fall Prevention:   activity supervised   assistive device/personal items within reach   clutter free environment maintained   elopement precautions   fall prevention program maintained   gait belt   nonskid shoes/slippers when out of bed   room organization consistent   safety round/check completed   toileting scheduled  Taken 2/19/2021 0024 by Janine Morse RN  Safety Promotion/Fall Prevention:   activity supervised   assistive device/personal items within reach   clutter free environment maintained   fall prevention program maintained   gait belt   elopement precautions   nonskid shoes/slippers when out of bed   room organization consistent   safety round/check completed   toileting scheduled  Taken 2/18/2021  2200 by Janine Morse RN  Safety Promotion/Fall Prevention:   activity supervised   assistive device/personal items within reach   clutter free environment maintained   elopement precautions   fall prevention program maintained   gait belt   nonskid shoes/slippers when out of bed   room organization consistent   toileting scheduled   safety round/check completed  Taken 2/18/2021 2058 by Janine Morse RN  Administration (IS):   instruction provided, follow-up   proper technique demonstrated     Problem: Postoperative Nausea and Vomiting (Shoulder Arthroplasty)  Goal: Nausea and Vomiting Relief  Outcome: Ongoing, Progressing     Problem: Pain (Shoulder Arthroplasty)  Goal: Acceptable Pain Control  Outcome: Ongoing, Progressing  Intervention: Prevent or Manage Pain  Recent Flowsheet Documentation  Taken 2/19/2021 0623 by Janine Morse RN  Pain Management Interventions:   see MAR   cold applied  Taken 2/18/2021 2058 by Janine Morse RN  Diversional Activities: television  Taken 2/18/2021 2055 by Janine Morse RN  Pain Management Interventions:   relaxation techniques promoted   unnecessary movement minimized     Problem: Postoperative Urinary Retention (Shoulder Arthroplasty)  Goal: Effective Urinary Elimination  Outcome: Ongoing, Progressing     Problem: Pain Acute  Goal: Optimal Pain Control  Outcome: Ongoing, Progressing  Intervention: Develop Pain Management Plan  Recent Flowsheet Documentation  Taken 2/19/2021 0623 by Janine Morse RN  Pain Management Interventions:   see MAR   cold applied  Taken 2/18/2021 2055 by Janine Morse RN  Pain Management Interventions:   relaxation techniques promoted   unnecessary movement minimized  Intervention: Optimize Psychosocial Wellbeing  Recent Flowsheet Documentation  Taken 2/18/2021 2058 by Janine Morse RN  Diversional Activities: television   Goal Outcome Evaluation:     Progress: improving  Outcome Summary: Pt. resting comfortably most of  shift. Up x1 assist to BR. Voiding appropriately. Minimal complaints of pain. Arrow infusing at 6mL/hr. SCDs worn. Ice packs in place. 2L NC/RA. VSS.

## 2021-02-19 NOTE — THERAPY DISCHARGE NOTE
Patient Name: Issa Farmer  : 1945    MRN: [id number]                              Today's Date: 2021       Admit Date: 2021    Visit Dx:     ICD-10-CM ICD-9-CM   1. Status post total shoulder arthroplasty, left  Z96.612 V43.61     Patient Active Problem List   Diagnosis   • HTN (hypertension)   • Glenohumeral arthritis, right   • Status post total shoulder arthroplasty, right   • Elevated hemoglobin A1c   • Leukocytosis, mild, likely reactive   • Acute postoperative pain   • Glenohumeral arthritis, left   • Status post total shoulder arthroplasty, left     Past Medical History:   Diagnosis Date   • Arthritis    • Cancer (CMS/HCC)     Left breast cancer    • Hypertension    • Shoulder pain     right     Past Surgical History:   Procedure Laterality Date   • EXPLORATORY LAPAROTOMY, TOTAL ABDOMINAL HYSTERECTOMY WITH TUMOR DEBULKING N/A 10/8/2018    Procedure: EXPLORATORY LAPAROTOMY, TOTAL ABDOMINAL HYSTERECTOMY, BILATERAL SALPINGO-OOPHORECTOMY;  Surgeon: Deysi Watts MD;  Location:  [facility];  Service: Gynecology   • HYSTERECTOMY     • MASTECTOMY Left    • REPLACEMENT TOTAL KNEE Left    • TOTAL SHOULDER ARTHROPLASTY Right 10/22/2020    Procedure: TOTAL SHOULDER ARTHROPASTY RIGHT;  Surgeon: Anderson Muñiz MD;  Location:  [facility];  Service: Orthopedics;  Laterality: Right;   • TOTAL SHOULDER ARTHROPLASTY Left 2021    Procedure: TOTAL SHOULDER ARTHROPLASTY LEFT WITH AUGMENTED GLENOID;  Surgeon: Anderson Muñiz MD;  Location:  [facility];  Service: Orthopedics;  Laterality: Left;   • TUBAL ABDOMINAL LIGATION       General Information     Row Name 21 1035          Physical Therapy Time and Intention    Document Type  evaluation  -SC     Mode of Treatment  physical therapy  -SC     Row Name 21 1035          General Information    Patient Profile Reviewed  yes  -SC     Prior Level of Function  independent:;gait uses a cane  -SC     Existing  Precautions/Restrictions  fall nerve cath, L arm in sling  -SC     Barriers to Rehab  none identified  -[facility] Name 02/19/21 1035          Living Environment    Lives With  alone  -[facility] Name 02/19/21 1035          Home Main Entrance    Number of Stairs, Main Entrance  one  -[facility] Name 02/19/21 1035          Cognition    Orientation Status (Cognition)  oriented x 4  -[facility] Name 02/19/21 1035          Safety Issues, Functional Mobility    Comment, Safety Issues/Impairments (Mobility)  alert, following commands  -SC       User Key  (r) = Recorded By, (t) = Taken By, (c) = Cosigned By    Initials Name Provider Type    SC Sharri, Shearon A, PT Physical Therapist        Mobility     [facility] Name 02/19/21 1036          Bed Mobility    Comment (Bed Mobility)  UIC  -[facility] Name 02/19/21 1036          Transfers    Comment (Transfers)  demonstrated safe transfer into standing  -SC     Row Name 02/19/21 1036          Sit-Stand Transfer    Sit-Stand Ruffin (Transfers)  modified independence  -SC     Assistive Device (Sit-Stand Transfers)  cane, straight  -[facility] Name 02/19/21 1036          Gait/Stairs (Locomotion)    Ruffin Level (Gait)  standby assist  -SC     Assistive Device (Gait)  cane, straight  -SC     Distance in Feet (Gait)  350  -SC     Comment (Gait/Stairs)  Demonstrated slow careful ambulation with good sequencing of cane. No loss of balance noter. NO c/o SOA. Took x2 standing breaks to check SATs  -[facility] Name 02/19/21 1036          Mobility    Extremity Weight-bearing Status  left upper extremity  -SC     Left Upper Extremity (Weight-bearing Status)  non weight-bearing (NWB)  -SC       User Key  (r) = Recorded By, (t) = Taken By, (c) = Cosigned By    Initials Name Provider Type    SC Sharri, Shearon A, PT Physical Therapist        Obj/Interventions     [facility] Name 02/19/21 1039          Range of Motion Comprehensive    Comment, General Range of Motion  L UE in sling. Other jts wfl   -SC     Row Name 02/19/21 1039          Strength Comprehensive (MMT)    Comment, General Manual Muscle Testing (MMT) Assessment  L UE: moving fingers . Other extremities grossly 4+/5  -SC     Row Name 02/19/21 1039          Motor Skills    Therapeutic Exercise  -- spirometr x10  -SC     Row Name 02/19/21 1039          Balance    Balance Assessment  standing dynamic balance  -SC     Dynamic Standing Balance  WFL;supported  -SC     Comment, Balance  uses a cane  -SC     Row Name 02/19/21 1039          Sensory Assessment (Somatosensory)    Sensory Assessment (Somatosensory)  -- L UE diminished to LT 2 to nerve cath  -SC       User Key  (r) = Recorded By, (t) = Taken By, (c) = Cosigned By    Initials Name Provider Type    SC Glenny Harrell, PT Physical Therapist        Goals/Plan    No documentation.       Clinical Impression     Row Name 02/19/21 1042          Pain    Additional Documentation  Pain Scale: FACES Pre/Post-Treatment (Group)  -SC     Row Name 02/19/21 1042          Pain Scale: FACES Pre/Post-Treatment    Pain: FACES Scale, Pretreatment  0-->no hurt  -SC     Posttreatment Pain Rating  0-->no hurt  -SC     Row Name 02/19/21 1042          Plan of Care Review    Plan of Care Reviewed With  patient  -SC     Progress  improving  -SC     Outcome Summary  Patient demonstrated safe mobility with cane with not c/o SOA. SATs remaied 95% on room air. She is going home with family assistance  -SC     Row Name 02/19/21 1042          Therapy Assessment/Plan (PT)    Patient/Family Therapy Goals Statement (PT)  go home  -SC     Rehab Potential (PT)  good, to achieve stated therapy goals  -SC     Criteria for Skilled Interventions Met (PT)  yes  -SC     Row Name 02/19/21 1042          Vital Signs    Pre SpO2 (%)  98  -SC     O2 Delivery Pre Treatment  room air  -SC     Intra SpO2 (%)  95  -SC     O2 Delivery Intra Treatment  room air  -SC     Post SpO2 (%)  95  -SC     O2 Delivery Post Treatment  room air  -[facility]  02/19/21 1042          Positioning and Restraints    Pre-Treatment Position  sitting in chair/recliner  -SC     Post Treatment Position  chair  -SC       User Key  (r) = Recorded By, (t) = Taken By, (c) = Cosigned By    Initials Name Provider Type    Glenny Allred PT Physical Therapist        Outcome Measures     Row Name 02/19/21 1040          How much help from another person do you currently need...    Turning from your back to your side while in flat bed without using bedrails?  3  -SC     Moving from lying on back to sitting on the side of a flat bed without bedrails?  3  -SC     Moving to and from a bed to a chair (including a wheelchair)?  3  -SC     Standing up from a chair using your arms (e.g., wheelchair, bedside chair)?  3  -SC     Climbing 3-5 steps with a railing?  3  -SC     To walk in hospital room?  3  -SC     AM-PAC 6 Clicks Score (PT)  18  -SC     Row Name 02/19/21 1040          Functional Assessment    Outcome Measure Options  AM-PAC 6 Clicks Basic Mobility (PT)  -SC       User Key  (r) = Recorded By, (t) = Taken By, (c) = Cosigned By    Initials Name Provider Type    Glenny Allred PT Physical Therapist        Physical Therapy Education                 Title: PT OT SLP Therapies (In Progress)     Topic: Physical Therapy (Done)     Point: Mobility training (Done)     Learning Progress Summary           Patient SAMIA Beckham VU by SC at 2/19/2021 1040    Comment: Reviewed benefits of activity                   Point: Home exercise program (Done)     Learning Progress Summary           Patient Eager, E, VU by SC at 2/19/2021 1040    Comment: Reviewed benefits of activity                   Point: Body mechanics (Done)     Learning Progress Summary           Patient Eager, E, VU by SC at 2/19/2021 1040    Comment: Reviewed benefits of activity                   Point: Precautions (Done)     Learning Progress Summary           Patient Eager, E, VU by SC at 2/19/2021 1040    Comment: Reviewed  benefits of activity                               User Key     Initials Effective Dates Name Provider Type Discipline    SC 06/19/15 -  Glenny Harrell PT Physical Therapist PT              PT Recommendation and Plan     Plan of Care Reviewed With: patient  Progress: improving  Outcome Summary: Patient demonstrated safe mobility with cane with not c/o SOA. SATs remaied 95% on room air. She is going home with family assistance     Time Calculation:   PT Charges     Row Name 02/19/21 1008             Time Calculation    Start Time  1008  -SC      PT Received On  02/19/21  -SC        User Key  (r) = Recorded By, (t) = Taken By, (c) = Cosigned By    Initials Name Provider Type    SC Glenny Harrell, PT Physical Therapist        Therapy Charges for Today     Code Description Service Date Service Provider Modifiers Qty    [id number]  PT EVAL MOD COMPLEXITY 3 2/19/2021 Glenny Harrell PT GP 1          PT G-Codes  Outcome Measure Options: AM-PAC 6 Clicks Basic Mobility (PT)  AM-PAC 6 Clicks Score (PT): 18    PT Discharge Summary  Anticipated Discharge Disposition (PT): home with assist    Glenny Harrell, PT  2/19/2021

## 2021-02-19 NOTE — PROGRESS NOTES
[facility]    Acute pain service Inpatient Progress Note    Patient Name: Issa Farmer  :  1945  MRN:  [id number]        Acute Pain  Service Inpatient Progress Note:    Analgesia:Excellent  Pain Score:0/10  LOC: alert and awake  Resp Status: room air  Cardiac: VS stable  Side Effects:None  Catheter Site:clean, dressing intact and dry  Cath type: peripheral nerve cath with ON Q  Infusion rate: 6ml/hr  Catheter Plan:Catheter to remain Insitu and Continue catheter infusion rate unchanged  Comments: ---------------------------------------------------------------------------------  Physical Therapy Manual Muscle Testing Results:   ]    Physical Therapy - Plan of Care Review - Outcome Summary:  Outcome Summary: Patient demonstrated safe mobility with cane with not c/o SOA. SATs remaied 95% on room air. She is going home with family assistance (21 [id number])]    Occupational Therapy - Plan of Care Review - Outcome Summary:  Outcome Summary: OT eval complete. OT educated pt and daughter on sling management as well as wear and care, shoulder precautions, axilla care, deejay dressing, and care of nerve catheter during ADLS. Daughter with excellent recall from pts last surgery and able to dof/don sling independently. Pt requires maxA for all axilla care and UBD. Daughter did not complete teach back on axilla care or UBD stating she feels competent from last surgery. Pt completed AROM x10 reps elbow/wrist/hand. All mobility deferred to PT this date. Recommend d/c home with assist from daughter. (21 4330)]  ----------------------------------------------------------------------------------

## 2021-02-19 NOTE — PLAN OF CARE
Problem: Adult Inpatient Plan of Care  Goal: Plan of Care Review  Recent Flowsheet Documentation  Taken 2/19/2021 1042 by Glenny Harrell, PT  Progress: improving  Plan of Care Reviewed With: patient  Outcome Summary: Patient demonstrated safe mobility with cane with not c/o SOA. SATs remaied 95% on room air. She is going home with family assistance   Goal Outcome Evaluation:  Plan of Care Reviewed With: patient  Progress: improving  Outcome Summary: Patient demonstrated safe mobility with cane with not c/o SOA. SATs remaied 95% on room air. She is going home with family assistance

## 2021-02-19 NOTE — PLAN OF CARE
Goal Outcome Evaluation:  Plan of Care Reviewed With: patient, daughter  Progress: improving  Outcome Summary: OT nayeli complete. OT educated pt and daughter on sling management as well as wear and care, shoulder precautions, axilla care, deejay dressing, and care of nerve catheter during ADLS. Daughter with excellent recall from pts last surgery and able to dof/don sling independently. Pt requires maxA for all axilla care and UBD. Daughter did not complete teach back on axilla care or UBD stating she feels competent from last surgery. Pt completed AROM x10 reps elbow/wrist/hand. All mobility deferred to PT this date. Recommend d/c home with assist from daughter.

## 2021-02-19 NOTE — DISCHARGE SUMMARY
Patient Name: Issa Farmer  MRN: [id number]  : 1945  DOS: 2021    Attending: Anderson Muñiz MD    Primary Care Provider: Yan Unger DO    Date of Admission:.2021 10:01 AM    Date of Discharge:  2021    Discharge Diagnosis:     Status post total shoulder arthroplasty, left    HTN (hypertension)    Elevated hemoglobin A1c    Glenohumeral arthritis, left      Hospital Course    At admit:  Patient is a pleasant 75 y.o. female presented for scheduled surgery by Dr. Muñiz.  She underwent left   total shoulder arthroplasty under GA and a block, tolerated surgery well, was admitted for further management.  Seen in her room postoperatively, doing fairly well.  No complains of nausea, vomiting, or shortness of breath.  No difficulty swallowing.  She has not yet ambulated as she has just arrived from PACU.  She is known to me from her previous admit for right shoulder arthroplasty that was back in October of this year.  She did well postoperatively and was discharged home on postop day 1 in good condition.  She is motivated to participate with therapy and go home tomorrow.    After admit:  Patient was provided pain medications as needed for pain control, along with interscalene nerve block infusion of Ropivacaine    Adjustments were made to pain medications to optimize postop pain management.   Risks and benefits of opiate medications discussed with patient. CHARLA report on chart was reviewed.    The patient was seen by OT and was taught exercises for left arm.  The patient used an IS for atelectasis prophylaxis and mechanicals for DVT prophylaxis.    Home medications were resumed as appropriate, and labs were monitored and remained fairly stable.     With the progress she has made, Ms. Farmer is ready for DC home today.      The patient will have an arrow pump ( instructed on it during this admit)  Discussed with patient regarding plan and she shows understanding  "and agreement.        Procedures Performed  Procedure(s):  TOTAL SHOULDER ARTHROPLASTY LEFT WITH AUGMENTED GLENOID       Pertinent Test Results:    I reviewed the patient's new clinical results.   Results from last 7 days   Lab Units 21  0716 02/15/21  1138   WBC 10*3/mm3 12.48* 7.55   HEMOGLOBIN g/dL 11.8* 13.0   HEMATOCRIT % 38.5 42.3   PLATELETS 10*3/mm3 198 196     Results from last 7 days   Lab Units 21  0716 02/15/21  1138   SODIUM mmol/L 137 136   POTASSIUM mmol/L 4.1 3.8   CHLORIDE mmol/L 102 99   CO2 mmol/L 28.0 33.0*   BUN mg/dL 8 11   CREATININE mg/dL 0.57 0.55*   CALCIUM mg/dL 8.4* 8.9   BILIRUBIN mg/dL  --  0.3   ALK PHOS U/L  --  56   ALT (SGPT) U/L  --  8   AST (SGOT) U/L  --  13   GLUCOSE mg/dL 158* 102*     I reviewed the patient's new imaging including images and reports.      Occupational Therapy  Outcome Summary: OT nayeli complete. OT educated pt and daughter on sling management as well as wear and care, shoulder precautions, axilla care, deejay dressing, and care of nerve catheter during ADLS. Daughter with excellent recall from pts last surgery and able to dof/don sling independently. Pt requires maxA for all axilla care and UBD. Daughter did not complete teach back on axilla care or UBD stating she feels competent from last surgery. Pt completed AROM x10 reps elbow/wrist/hand. All mobility deferred to PT this date. Recommend d/c home with assist from daughter.    Physical therapy  Progress: improving  Outcome Summary: Patient demonstrated safe mobility with cane with not c/o SOA. SATs remaied 95% on room air. She is going home with family assistance    Discharge Assessment:       Visit Vitals  /84 (BP Location: Left arm, Patient Position: Lying)   Pulse 90   Temp 98.1 °F (36.7 °C) (Oral)   Resp 16   Ht 167.6 cm (66\")   Wt 77.3 kg (170 lb 6.4 oz)   SpO2 97%   BMI 27.50 kg/m²     Temp (24hrs), Av.7 °F (36.5 °C), Min:97.5 °F (36.4 °C), Max:98.1 °F (36.7 °C)      General " Appearance:    Alert, cooperative, in no acute distress   Lungs:     Clear to auscultation,respirations regular, even and   unlabored    Heart:    Regular rhythm and normal rate, normal S1 and S2    Abdomen:     Normal bowel sounds, no masses, no organomegaly, soft        non-tender, non-distended, no guarding, no rebound                 tenderness   Extremities:  Left UE in a sling, CDI request dressing over left shoulder incision . Interscalene nerve block cath present.  Distal pulses, cap refill,  intact.  Good movement of the right hand and wrist     Pulses:   Pulses palpable and equal bilaterally   Skin:   No bleeding, bruising or rash        Discharge Disposition: Home          Discharge Medications      New Medications      Instructions Start Date   oxyCODONE 5 MG immediate release tablet  Commonly known as: Roxicodone   5 mg, Oral, Every 4 Hours PRN      polyethylene glycol 17 GM/SCOOP powder  Commonly known as: MIRALAX   Dissolve 1 capful (17g) in water and drink by mouth Daily.      Ropivacine HCl-NaCl  Commonly known as: NAROPIN   6 mL/hr (12 mg/hr), Peripheral Nerve, Continuous         Continue These Medications      Instructions Start Date   acetaminophen 325 MG tablet  Commonly known as: TYLENOL   650 mg, Oral, Every 6 Hours Scheduled      aspirin 81 MG EC tablet   81 mg, Oral, Daily      Cranberry 250 MG tablet   1 tablet, Oral, Daily      docusate sodium 100 MG capsule   200 mg, Oral, 2 Times Daily      ibuprofen 600 MG tablet  Commonly known as: ADVIL,MOTRIN   600 mg, Oral, Every 6 Hours PRN      losartan 50 MG tablet  Commonly known as: COZAAR   50 mg, Oral, Daily      ondansetron 4 MG tablet  Commonly known as: ZOFRAN   Take 1 tablet by mouth Every 8 (Eight) Hours As Needed for Post-Op Nausea.      senna 8.6 MG tablet  Commonly known as: SENOKOT   1 tablet, Oral, Daily         Stop These Medications    benzoyl peroxide 5 % external liquid  Generic drug: benzoyl peroxide            Discharge Diet:  Resume prehospital diet    Activity at Discharge:   NWB left upper, ROM elbow, wrist, and hand per Dr. Muñiz instructions.    Follow-up Appointments  Anderson Muñiz MD per his orders         Higinio Carvalho MD  02/19/21  12:11 EST

## 2021-02-20 NOTE — PROGRESS NOTES
TAMEKA Seth    Nerve Cath Post Op Call    Patient Name: Issa Farmer  :  1945  MRN:  [id number]  Date of Discharge: 2021    Nerve Cath Post Op Call:    Analgesia:Good  Pain Score:0/10  Episode history: Hoarseness, no SOB. Patient is okay with this side effect.  Catheter Site:clean  Patient Controlled ON Q pump infusion rate: 6ml/hr  Catheter Plan:Will continue with plan at home without changes and The patient was instructed to call ON CALL Anesthesia provider for any questions or problems  Patient/Family instructed to call ON CALL anesthesia provider for any questions or problems.  Patient Follow Up:  Patient provided the educational video about the nerve catheter and pain pump.  Video adequately prepared patient to manage pain pump at home.  Tylenol use: acetaminophen use

## 2021-02-22 NOTE — PROGRESS NOTES
TAMEKA Seth    Nerve Cath Post Op Call    Patient Name: Issa Farmer  :  1945  MRN:  [id number]  Date of Discharge: 2021    Nerve Cath Post Op Call:    Catheter Plan:Patient/Family member report nerve catheter previously discontinued, tip intact  Patient/Family instructed to call ON CALL anesthesia provider for any questions or problems.  Patient Follow Up:

## 2023-08-13 NOTE — PLAN OF CARE
Problem: Patient Care Overview  Goal: Plan of Care Review  Outcome: Ongoing (interventions implemented as appropriate)      Problem: Pain, Acute (Adult)  Goal: Identify Related Risk Factors and Signs and Symptoms  Outcome: Ongoing (interventions implemented as appropriate)    Goal: Acceptable Pain Control/Comfort Level  Outcome: Ongoing (interventions implemented as appropriate)   10/02/18 0343   Pain, Acute (Adult)   Acceptable Pain Control/Comfort Level making progress toward outcome          oral

## 2024-09-23 ENCOUNTER — HOSPITAL ENCOUNTER (OUTPATIENT)
Dept: HOSPITAL 22 - LAB | Age: 79
Discharge: HOME | End: 2024-09-23
Payer: MEDICARE

## 2024-09-23 DIAGNOSIS — N39.0: Primary | ICD-10-CM

## 2024-09-23 DIAGNOSIS — R31.9: ICD-10-CM

## 2024-09-23 LAB
BUN SERPL-MCNC: 15 MG/DL (ref 7–17)
COLOR UR: YELLOW
CREAT BLD-SCNC: 0.5 MG/DL (ref 0.52–1.04)
ESTIMATED GLOMERULAR FILT RATE: 119 ML/MIN (ref 60–?)
GFR (AFRICAN AMERICAN): 144 ML/MIN (ref 60–?)
LEUKOCYTE ESTERASE UR QL STRIP: (no result)
MICRO URNS: (no result)
PH UR: 6.5 [PH] (ref 5–8.5)
SP GR UR: 1.01 (ref 1–1.03)
UROBILINOGEN UR QL: 0.2 EU/DL
WBC # UR: (no result) #/HPF (ref 0–3)

## 2024-09-23 PROCEDURE — 84520 ASSAY OF UREA NITROGEN: CPT

## 2024-09-23 PROCEDURE — 87086 URINE CULTURE/COLONY COUNT: CPT

## 2024-09-23 PROCEDURE — 36415 COLL VENOUS BLD VENIPUNCTURE: CPT

## 2024-09-23 PROCEDURE — 82565 ASSAY OF CREATININE: CPT

## 2024-09-23 PROCEDURE — 81001 URINALYSIS AUTO W/SCOPE: CPT

## 2024-10-04 ENCOUNTER — HOSPITAL ENCOUNTER (OUTPATIENT)
Dept: HOSPITAL 22 - RAD | Age: 79
Discharge: HOME | End: 2024-10-04
Payer: MEDICARE

## 2024-10-04 DIAGNOSIS — N39.0: Primary | ICD-10-CM

## 2024-10-04 DIAGNOSIS — R31.9: ICD-10-CM

## 2024-10-04 PROCEDURE — 74178 CT ABD&PLV WO CNTR FLWD CNTR: CPT

## 2024-10-04 RX ADMIN — SODIUM CHLORIDE, PRESERVATIVE FREE 10 ML: 5 INJECTION INTRAVENOUS at 09:03

## 2024-10-04 RX ADMIN — BARIUM SULFATE 450 ML: 21 SUSPENSION ORAL at 09:03

## 2024-10-04 RX ADMIN — IOPAMIDOL 75 ML: 755 INJECTION, SOLUTION INTRAVENOUS at 09:03

## 2024-10-04 NOTE — CT_ITS
FINAL REPORT 
 
TECHNIQUE: 
Pre-and postcontrast axial CT images of the abdomen and pelvis 
were obtained. Coronal reformatted images were also obtained and 
reviewed.  This study was performed with techniques to keep 
radiation doses as low as reasonably achievable (ALARA). 
Individualized dose reduction techniques using automated 
exposure control or adjustment of mA and/or kV according to the 
patient's size were employed. 
 
CLINICAL HISTORY: 
UTI 
 
COMPARISON: 
None 
 
FINDINGS: 
On the preinfusion images, there is no evidence of kidney 
stones.  Dense vascular calcifications are noted in the 
abdominal aorta and iliac vessels.  Postinfusion images 
demonstrate the left breast is absent.  The lung bases are 
clear.  There is mild diffuse fatty infiltration of the liver. 
The gallbladder is present.  The spleen, pancreas, adrenals, and 
kidneys are unremarkable.  The appendix is normal.  There is 
extensive descending and sigmoid diverticulosis without evidence 
of diverticulitis.  The urinary bladder is decompressed.  There 
is mild stranding around the urinary bladder with segmental 
thickening of the bladder wall, probably due to cystitis. 
 
IMPRESSION: 
No evidence of kidney stones.  Bladder wall thickening and 
inflammation consistent with cystitis.  Descending and sigmoid 
diverticulosis without evidence of diverticulitis. 
 
Reviewed, Interpreted and Dictated by Ludin Chacon MD 
Transcribed by Sima Moffett 
 
Authenticated and Electronically Signed by Ludin Chacon MD 
on 10/04/2024 11:39:08 AM [facility]

## 2024-10-21 ENCOUNTER — HOSPITAL ENCOUNTER (OUTPATIENT)
Dept: HOSPITAL 22 - LAB.DROPOF | Age: 79
Discharge: HOME | End: 2024-10-21
Payer: MEDICARE

## 2024-10-21 DIAGNOSIS — N39.0: Primary | ICD-10-CM

## 2024-10-21 LAB
COLOR UR: YELLOW
LEUKOCYTE ESTERASE UR QL STRIP: (no result)
MICRO URNS: (no result)
PH UR: 6 [PH] (ref 5–8.5)
PROT UR STRIP-MCNC: (no result) MG/DL
RBC #/AREA URNS HPF: (no result) #/HPF (ref 0–3)
SP GR UR: >= 1.03 (ref 1–1.03)
SQUAMOUS URNS QL MICRO: (no result) #/HPF (ref 0–5)
UROBILINOGEN UR QL: 0.2 EU/DL
WBC # UR: (no result) #/HPF (ref 0–3)

## 2024-10-21 PROCEDURE — 81001 URINALYSIS AUTO W/SCOPE: CPT

## 2024-10-21 PROCEDURE — 87086 URINE CULTURE/COLONY COUNT: CPT

## 2024-11-15 ENCOUNTER — HOSPITAL ENCOUNTER (OUTPATIENT)
Age: 79
Discharge: HOME | End: 2024-11-15
Payer: MEDICARE

## 2024-11-15 VITALS
OXYGEN SATURATION: 97 % | SYSTOLIC BLOOD PRESSURE: 118 MMHG | DIASTOLIC BLOOD PRESSURE: 67 MMHG | TEMPERATURE: 97.3 F | RESPIRATION RATE: 18 BRPM | HEART RATE: 73 BPM

## 2024-11-15 VITALS — BODY MASS INDEX: 23.8 KG/M2

## 2024-11-15 VITALS
TEMPERATURE: 97.16 F | SYSTOLIC BLOOD PRESSURE: 166 MMHG | HEART RATE: 78 BPM | RESPIRATION RATE: 16 BRPM | OXYGEN SATURATION: 95 % | DIASTOLIC BLOOD PRESSURE: 86 MMHG

## 2024-11-15 DIAGNOSIS — N32.89: ICD-10-CM

## 2024-11-15 DIAGNOSIS — R31.9: Primary | ICD-10-CM

## 2024-11-15 LAB
APPEARANCE,URINE/CATH: CLEAR
BACTERIA,URINE/CATH: (no result) /LPF
BLOOD, URINE/CATH: (no result)
COLOR,URINE/CATH: YELLOW
LEUKOCYTE ESTERASE,CATH: (no result)
MICROSCOPIC,CATH: (no result)
MUCUS,URINE/CATH: (no result) /LPF
PH,URINE/CATH: 7 (ref 5–8.5)
PROTEIN,URINE/CATH: (no result)
RBC,URINE/CATH: (no result) # /HPF (ref 0–3)
SPECIFIC GRAVITY, URINE/CATH: 1.02 (ref 1–1.03)
SQUAMOUS EPITHELIAL UR./CATH: (no result) #/HPF (ref 0–5)
UROBILINOGEN,CATH: 0.2 EU/DL
WBC,URINE/CATH: (no result) #/HPF (ref 0–3)

## 2024-11-15 PROCEDURE — 52000 CYSTOURETHROSCOPY: CPT

## 2024-11-15 PROCEDURE — 0TJB8ZZ INSPECTION OF BLADDER, VIA NATURAL OR ARTIFICIAL OPENING ENDOSCOPIC: ICD-10-PCS | Performed by: UROLOGY

## 2024-11-15 PROCEDURE — 81001 URINALYSIS AUTO W/SCOPE: CPT

## 2024-11-15 PROCEDURE — 87086 URINE CULTURE/COLONY COUNT: CPT

## (undated) DEVICE — SUT VIC 12X27 D8116 BX/12

## (undated) DEVICE — OSCILLATING TIP SAW CARTRIDGE: Brand: PRECISION FALCON

## (undated) DEVICE — DRAPE,REIN 53X77,STERILE: Brand: MEDLINE

## (undated) DEVICE — INTENDED FOR TISSUE SEPARATION, AND OTHER PROCEDURES THAT REQUIRE A SHARP SURGICAL BLADE TO PUNCTURE OR CUT.: Brand: BARD-PARKER ® CARBON RIB-BACK BLADES

## (undated) DEVICE — ADAPT ST INFUS ADMIN SYR 70IN

## (undated) DEVICE — PUMP PAIN AUTOFUSER AUTO SELCT NOBOLUS 1TO14ML/HR 550ML DISP

## (undated) DEVICE — DRSNG SURG AQUACEL AG 9X25CM

## (undated) DEVICE — PROXIMATE RH ROTATING HEAD SKIN STAPLERS (35 WIDE) CONTAINS 35 STAINLESS STEEL STAPLES: Brand: PROXIMATE

## (undated) DEVICE — GLV SURG SENSICARE MICRO PF LF 6 STRL

## (undated) DEVICE — SPNG GZ WOVN 4X4IN 12PLY 10/BX STRL

## (undated) DEVICE — SUT ETHLN 3/0 PC5 18IN 1893G

## (undated) DEVICE — UNDERGLV SURG BIOGEL INDICAT PI SZ8 BLU

## (undated) DEVICE — GLV SURG PREMIERPRO MIC LTX PF SZ6.5 BRN

## (undated) DEVICE — UNDERGLV SURG BIOGEL INDICAT PF 61/2 GRN

## (undated) DEVICE — SKIN AFFIX SURG ADHESIVE 72/CS 0.55ML: Brand: MEDLINE

## (undated) DEVICE — COVER,LIGHT HANDLE,FLX,1/PK: Brand: MEDLINE INDUSTRIES, INC.

## (undated) DEVICE — CVR HNDL LIGHT RIGID

## (undated) DEVICE — ST PIN FIX TEMP UNIVERSREVERS W/BRKAWAY/GUIDE/PIN 2.4MM STRL

## (undated) DEVICE — HARMONIC ACE +7 SHEARS ADVANCED HEMOSTASIS 5MM DIAMETER 23CM SHAFT LENGTH  FOR USE WITH GRAY HAND PIECE ONLY: Brand: HARMONIC ACE

## (undated) DEVICE — T-MAX DISPOSABLE FACE MASK 8 PER BOX

## (undated) DEVICE — GLV SURG SENSICARE PI MIC PF SZ7.5 LF STRL

## (undated) DEVICE — GLV SURG SENSICARE PI ORTHO SZ7.5 LF STRL

## (undated) DEVICE — FLTR HME STR UNIV W/SMPL PORT

## (undated) DEVICE — MEDI-VAC NON-CONDUCTIVE SUCTION TUBING: Brand: CARDINAL HEALTH

## (undated) DEVICE — SUT VIC 2/0 CT2 27IN J269H

## (undated) DEVICE — SLNG ULTRSLING2 11TO13IN MD

## (undated) DEVICE — AIRWY 90MM NO9

## (undated) DEVICE — SWABSTK SKINPREP PVPI PRE/SAT 8IN STRL

## (undated) DEVICE — MEDI-VAC YANKAUER SUCTION HANDLE W/BULBOUS TIP: Brand: CARDINAL HEALTH

## (undated) DEVICE — DRAPE,UNDERBUTTOCKS,STERILE: Brand: MEDLINE

## (undated) DEVICE — 3 BONE CEMENT MIXER: Brand: MIXEVAC

## (undated) DEVICE — 3M™ STERI-DRAPE™ INSTRUMENT POUCH 1018: Brand: STERI-DRAPE™

## (undated) DEVICE — ST INF 2NDARY 20DRP VNT/NOVNT 30IN

## (undated) DEVICE — DRAPE, LAVH, STERILE: Brand: MEDLINE

## (undated) DEVICE — ANTIBACTERIAL UNDYED BRAIDED (POLYGLACTIN 910), SYNTHETIC ABSORBABLE SUTURE: Brand: COATED VICRYL

## (undated) DEVICE — GLV SURG DERMASSURE GRN LF PF SZ 6.5

## (undated) DEVICE — PK MAJ SHLDR SPLT 10

## (undated) DEVICE — SUT VICYL PLS CTD ANTIB BR 1 27IN VIL

## (undated) DEVICE — 450 ML BOTTLE OF 0.05% CHLORHEXIDINE GLUCONATE IN 99.95% STERILE WATER FOR IRRIGATION, USP AND APPLICATOR.: Brand: IRRISEPT ANTIMICROBIAL WOUND LAVAGE

## (undated) DEVICE — DUAL LUMEN STOMACH TUBE,ANTI-REFLUX VALVE: Brand: SALEM SUMP

## (undated) DEVICE — 3M™ IOBAN™ 2 ANTIMICROBIAL INCISE DRAPE 6651EZ: Brand: IOBAN™ 2

## (undated) DEVICE — SYR SLP TP 10ML DISP

## (undated) DEVICE — TRY SKINPREP DRYPREP

## (undated) DEVICE — ISO/ALC 70PCT 16OZ

## (undated) DEVICE — T4 PULLOVER TOGA, LARGE

## (undated) DEVICE — SUT PDS LP 1 TP1 96IN VIO PDP880GA

## (undated) DEVICE — LEX BASIC NO DRAPE: Brand: MEDLINE INDUSTRIES, INC.

## (undated) DEVICE — CANNULA,OXY,ADULT,SUPERSOFT,W/7'TUB,UC: Brand: MEDLINE

## (undated) DEVICE — STRYKER PERFORMANCE SERIES SAGITTAL BLADE: Brand: STRYKER PERFORMANCE SERIES

## (undated) DEVICE — 1010 S-DRAPE TOWEL DRAPE 10/BX: Brand: STERI-DRAPE™

## (undated) DEVICE — GUIDEPIN AEQUALIS PERFORM PLS 2.5X220MM

## (undated) DEVICE — 3M™ STERI-DRAPE™ U-DRAPE 1015: Brand: STERI-DRAPE™

## (undated) DEVICE — SUT MONOCRYL PLS ANTIB UND 3/0  PS1 27IN